# Patient Record
Sex: MALE | URBAN - METROPOLITAN AREA
[De-identification: names, ages, dates, MRNs, and addresses within clinical notes are randomized per-mention and may not be internally consistent; named-entity substitution may affect disease eponyms.]

---

## 2017-09-15 ENCOUNTER — EMERGENCY (EMERGENCY)
Facility: HOSPITAL | Age: 52
LOS: 1 days | Discharge: HOME | End: 2017-09-15
Attending: EMERGENCY MEDICINE
Payer: MEDICARE

## 2017-09-15 VITALS
DIASTOLIC BLOOD PRESSURE: 74 MMHG | SYSTOLIC BLOOD PRESSURE: 136 MMHG | HEART RATE: 77 BPM | OXYGEN SATURATION: 100 % | TEMPERATURE: 98 F | RESPIRATION RATE: 20 BRPM

## 2017-09-15 VITALS
DIASTOLIC BLOOD PRESSURE: 90 MMHG | HEART RATE: 73 BPM | OXYGEN SATURATION: 96 % | SYSTOLIC BLOOD PRESSURE: 148 MMHG | RESPIRATION RATE: 18 BRPM | WEIGHT: 279.99 LBS | HEIGHT: 72 IN | TEMPERATURE: 99 F

## 2017-09-15 LAB
ALBUMIN SERPL ELPH-MCNC: 4.4 G/DL — SIGNIFICANT CHANGE UP (ref 3.3–5.2)
ALP SERPL-CCNC: 50 U/L — SIGNIFICANT CHANGE UP (ref 40–120)
ALT FLD-CCNC: 15 U/L — SIGNIFICANT CHANGE UP
ANION GAP SERPL CALC-SCNC: 13 MMOL/L — SIGNIFICANT CHANGE UP (ref 5–17)
AST SERPL-CCNC: 20 U/L — SIGNIFICANT CHANGE UP
BASOPHILS # BLD AUTO: 7.5 K/UL — HIGH (ref 0–0.2)
BASOPHILS NFR BLD AUTO: 100 % — HIGH (ref 0–2)
BILIRUB SERPL-MCNC: 0.6 MG/DL — SIGNIFICANT CHANGE UP (ref 0.4–2)
BUN SERPL-MCNC: 16 MG/DL — SIGNIFICANT CHANGE UP (ref 8–20)
CALCIUM SERPL-MCNC: 9.3 MG/DL — SIGNIFICANT CHANGE UP (ref 8.6–10.2)
CHLORIDE SERPL-SCNC: 102 MMOL/L — SIGNIFICANT CHANGE UP (ref 98–107)
CO2 SERPL-SCNC: 29 MMOL/L — SIGNIFICANT CHANGE UP (ref 22–29)
CREAT SERPL-MCNC: 1.19 MG/DL — SIGNIFICANT CHANGE UP (ref 0.5–1.3)
CRP SERPL-MCNC: 2.6 MG/DL — HIGH (ref 0–0.4)
EOSINOPHIL # BLD AUTO: 0.9 K/UL — HIGH (ref 0–0.5)
EOSINOPHIL NFR BLD AUTO: 12.3 % — HIGH (ref 0–5)
ERYTHROCYTE [SEDIMENTATION RATE] IN BLOOD: 16 MM/HR — SIGNIFICANT CHANGE UP (ref 0–20)
GLUCOSE SERPL-MCNC: 101 MG/DL — SIGNIFICANT CHANGE UP (ref 70–115)
HCT VFR BLD CALC: 43.9 % — SIGNIFICANT CHANGE UP (ref 42–52)
HGB BLD-MCNC: 14.3 G/DL — SIGNIFICANT CHANGE UP (ref 14–18)
LYMPHOCYTES # BLD AUTO: 1.6 K/UL — SIGNIFICANT CHANGE UP (ref 1–4.8)
LYMPHOCYTES # BLD AUTO: 21.2 % — SIGNIFICANT CHANGE UP (ref 20–55)
MCHC RBC-ENTMCNC: 28.4 PG — SIGNIFICANT CHANGE UP (ref 27–31)
MCHC RBC-ENTMCNC: 32.6 G/DL — SIGNIFICANT CHANGE UP (ref 32–36)
MCV RBC AUTO: 87.3 FL — SIGNIFICANT CHANGE UP (ref 80–94)
MONOCYTES # BLD AUTO: 0 K/UL — SIGNIFICANT CHANGE UP (ref 0–0.8)
MONOCYTES NFR BLD AUTO: 0.4 % — LOW (ref 3–10)
NEUTROPHILS # BLD AUTO: 5 K/UL — SIGNIFICANT CHANGE UP (ref 1.8–8)
NEUTROPHILS NFR BLD AUTO: 66.1 % — SIGNIFICANT CHANGE UP (ref 37–73)
PLATELET # BLD AUTO: 242 K/UL — SIGNIFICANT CHANGE UP (ref 150–400)
POTASSIUM SERPL-MCNC: 4.1 MMOL/L — SIGNIFICANT CHANGE UP (ref 3.5–5.3)
POTASSIUM SERPL-SCNC: 4.1 MMOL/L — SIGNIFICANT CHANGE UP (ref 3.5–5.3)
PROT SERPL-MCNC: 8.1 G/DL — SIGNIFICANT CHANGE UP (ref 6.6–8.7)
RBC # BLD: 5.03 M/UL — SIGNIFICANT CHANGE UP (ref 4.6–6.2)
RBC # FLD: 15.5 % — SIGNIFICANT CHANGE UP (ref 11–15.6)
SODIUM SERPL-SCNC: 144 MMOL/L — SIGNIFICANT CHANGE UP (ref 135–145)
URATE SERPL-MCNC: 9.1 MG/DL — HIGH (ref 3.4–7)
WBC # BLD: 7.5 K/UL — SIGNIFICANT CHANGE UP (ref 4.8–10.8)
WBC # FLD AUTO: 7.5 K/UL — SIGNIFICANT CHANGE UP (ref 4.8–10.8)

## 2017-09-15 PROCEDURE — 99284 EMERGENCY DEPT VISIT MOD MDM: CPT

## 2017-09-15 PROCEDURE — 86140 C-REACTIVE PROTEIN: CPT

## 2017-09-15 PROCEDURE — 84550 ASSAY OF BLOOD/URIC ACID: CPT

## 2017-09-15 PROCEDURE — 96374 THER/PROPH/DIAG INJ IV PUSH: CPT

## 2017-09-15 PROCEDURE — 80053 COMPREHEN METABOLIC PANEL: CPT

## 2017-09-15 PROCEDURE — 73610 X-RAY EXAM OF ANKLE: CPT

## 2017-09-15 PROCEDURE — 99284 EMERGENCY DEPT VISIT MOD MDM: CPT | Mod: 25

## 2017-09-15 PROCEDURE — 73610 X-RAY EXAM OF ANKLE: CPT | Mod: 26,LT

## 2017-09-15 PROCEDURE — 85652 RBC SED RATE AUTOMATED: CPT

## 2017-09-15 PROCEDURE — 36415 COLL VENOUS BLD VENIPUNCTURE: CPT

## 2017-09-15 PROCEDURE — 85027 COMPLETE CBC AUTOMATED: CPT

## 2017-09-15 RX ORDER — IBUPROFEN 200 MG
1 TABLET ORAL
Qty: 21 | Refills: 0 | OUTPATIENT
Start: 2017-09-15 | End: 2017-09-22

## 2017-09-15 RX ORDER — IBUPROFEN 200 MG
800 TABLET ORAL ONCE
Qty: 0 | Refills: 0 | Status: COMPLETED | OUTPATIENT
Start: 2017-09-15 | End: 2017-09-15

## 2017-09-15 RX ORDER — MORPHINE SULFATE 50 MG/1
2 CAPSULE, EXTENDED RELEASE ORAL ONCE
Qty: 0 | Refills: 0 | Status: DISCONTINUED | OUTPATIENT
Start: 2017-09-15 | End: 2017-09-15

## 2017-09-15 RX ADMIN — Medication 800 MILLIGRAM(S): at 08:22

## 2017-09-15 RX ADMIN — MORPHINE SULFATE 2 MILLIGRAM(S): 50 CAPSULE, EXTENDED RELEASE ORAL at 08:22

## 2017-09-15 NOTE — ED PROVIDER NOTE - PROGRESS NOTE DETAILS
The patient unable to ambulate and ortho c/s called NO SEPTIC JOINT.  Most likely Gout.  Will DC home and f/u with PMD and Rheumatology

## 2017-09-15 NOTE — CONSULT NOTE ADULT - SUBJECTIVE AND OBJECTIVE BOX
Pt Name: BOWEN SIU    MRN: [id number]      Patient is a 52y Male presenting to the emergency department with a chief complaint of left ankle   Patient is a 52y old  Male who presents with a chief complaint of Left ankle pain since Wed. patient states was at work and felt onset of pain, no trauma, patient states pain increased this am, no fevers, no chills, no myalgia.  Er performed xrays, negative, CRP and Uric acid level increased    HEALTH ISSUES - PROBLEM Dx: Left ankle pain       .      REVIEW OF SYSTEMS      General: no fevers, no chills 	    Skin/Breast:  	  Ophthalmologic:  	  ENMT:	    Respiratory and Thorax:  	  Cardiovascular:	no chest pain     Gastrointestinal:	 no abdominal pain     Genitourinary:	    Musculoskeletal:	 Left ankle pain     Neurological:	    Psychiatric:	    Hematology/Lymphatics:	    Endocrine:	    Allergic/Immunologic:	    ROS is otherwise negative.    PAST MEDICAL & SURGICAL HISTORY:  PAST MEDICAL & SURGICAL HISTORY:  Asthma      Allergies: No Known Allergies      Medications:     FAMILY HISTORY:  : non-contributory    Social History: denies drug use    Ambulation: Walking independently                          14.3   7.5   )-----------( 242      ( 15 Sep 2017 08:38 )             43.9     09-15    144  |  102  |  16.0  ----------------------------<  101  4.1   |  29.0  |  1.19    Ca    9.3      15 Sep 2017 08:38    TPro  8.1  /  Alb  4.4  /  TBili  0.6  /  DBili  x   /  AST  20  /  ALT  15  /  AlkPhos  50  09-15      PHYSICAL EXAM:    Vital Signs Last 24 Hrs  T(C): 36.8 (15 Sep 2017 07:29), Max: 37 (15 Sep 2017 05:48)  T(F): 98.2 (15 Sep 2017 07:29), Max: 98.6 (15 Sep 2017 05:48)  HR: 71 (15 Sep 2017 07:29) (71 - 73)  BP: 150/88 (15 Sep 2017 07:29) (148/90 - 150/88)  BP(mean): --  RR: 18 (15 Sep 2017 07:29) (18 - 18)  SpO2: 98% (15 Sep 2017 07:29) (96% - 98%)  Daily Height in cm: 182.88 (15 Sep 2017 05:48)    Daily     Appearance: Alert, responsive, in no acute distress.    ENT Mucus Membranes moist intact     Neck FROM    Resp no labored breathing     Neurological: Sensation is grossly intact to light touch. 5/5 motor function of all extremities. No focal deficits or weaknesses found.    Skin: no rash on visible skin. Skin is clean, dry and intact. No bleeding. No abrasions. No ulcerations.    Vascular: 2+ distal pulses. Cap refill < 2 sec. No signs of venous insuffiency or stasis. No extremity ulcerations. No cyanosis.    Musculoskeletal:         Left Lower Extremity: Mild swelling to ankle, positive lateral tenderness, ROM intact without pain, no erythema, no warmth, pulse intact, no tenderness to foot     Imaging Studies: Left ankle xray negative fx     Case discussed with Dr Shelton    A/P:  Pt is a  52y Male with Patient is a 52y old  Male who presents with a chief complaint of left ankle pain  found to have left ankle swelling likely gout    PLAN:   1. NSAIDS for relief   2. Outpt follow up   3. Patient instructed to return to ER for Worsening of pain or fevers, chills, myaglia develop

## 2017-09-15 NOTE — ED PROVIDER NOTE - CHPI ED SYMPTOMS NEG
no weakness/no fever/no deformity/no abrasion/no back pain/no numbness/no tingling/no bruising/no stiffness

## 2017-09-15 NOTE — ED ADULT NURSE REASSESSMENT NOTE - NS ED NURSE REASSESS COMMENT FT1
Patient recd this morning A/Ox3, VSS, presents to ED stating that on wednesday when he got off work he noticed his left ankle was swollen and throbbing, patient states pain is only getting worse, 9/10, and is unable to walk on it.  Patient denies trauma.  Patient denies chest pain or sob.  Respirations are even and unlabored, lungs cta, +bowel x4 quads, abdomen soft, nontender/nondistended, skin w/d/i.

## 2017-09-15 NOTE — CONSULT NOTE ADULT - ATTENDING COMMENTS
- 51 yo male - Left ankle pain.  - Note reviewed and agree with plan: anti-inflammatories and follow up as needed    Tanya Shelton MD

## 2017-09-15 NOTE — ED ADULT NURSE NOTE - OBJECTIVE STATEMENT
patient states that he has had left ankle pain which started wednesday, he states that he has been limping and unable to bare weight on extremity. patient denies any trauma, trips or falls. Patient states that he was working and didn't tie his boots which may be related. patient has postive pulse, motor and sensory in extremity, able to move left foot with pain, patient has swelling around joint

## 2018-04-10 ENCOUNTER — RECORD ABSTRACTING (OUTPATIENT)
Age: 53
End: 2018-04-10

## 2018-04-10 DIAGNOSIS — Z82.49 FAMILY HISTORY OF ISCHEMIC HEART DISEASE AND OTHER DISEASES OF THE CIRCULATORY SYSTEM: ICD-10-CM

## 2018-04-10 DIAGNOSIS — R07.9 CHEST PAIN, UNSPECIFIED: ICD-10-CM

## 2018-04-10 DIAGNOSIS — Z86.79 PERSONAL HISTORY OF OTHER DISEASES OF THE CIRCULATORY SYSTEM: ICD-10-CM

## 2018-04-12 ENCOUNTER — APPOINTMENT (OUTPATIENT)
Dept: CARDIOLOGY | Facility: CLINIC | Age: 53
End: 2018-04-12

## 2018-06-25 ENCOUNTER — INPATIENT (INPATIENT)
Facility: HOSPITAL | Age: 53
LOS: 1 days | Discharge: HOME | DRG: 287 | End: 2018-06-27
Attending: INTERNAL MEDICINE | Admitting: HOSPITALIST
Payer: MEDICARE

## 2018-06-25 ENCOUNTER — TRANSCRIPTION ENCOUNTER (OUTPATIENT)
Age: 53
End: 2018-06-25

## 2018-06-25 VITALS
WEIGHT: 289.91 LBS | TEMPERATURE: 98 F | RESPIRATION RATE: 20 BRPM | OXYGEN SATURATION: 96 % | DIASTOLIC BLOOD PRESSURE: 155 MMHG | SYSTOLIC BLOOD PRESSURE: 182 MMHG | HEIGHT: 71 IN | HEART RATE: 91 BPM

## 2018-06-25 DIAGNOSIS — I24.9 ACUTE ISCHEMIC HEART DISEASE, UNSPECIFIED: ICD-10-CM

## 2018-06-25 DIAGNOSIS — Z29.9 ENCOUNTER FOR PROPHYLACTIC MEASURES, UNSPECIFIED: ICD-10-CM

## 2018-06-25 DIAGNOSIS — I10 ESSENTIAL (PRIMARY) HYPERTENSION: ICD-10-CM

## 2018-06-25 DIAGNOSIS — R07.9 CHEST PAIN, UNSPECIFIED: ICD-10-CM

## 2018-06-25 LAB
ALBUMIN SERPL ELPH-MCNC: 4.4 G/DL — SIGNIFICANT CHANGE UP (ref 3.3–5.2)
ALP SERPL-CCNC: 44 U/L — SIGNIFICANT CHANGE UP (ref 40–120)
ALT FLD-CCNC: 13 U/L — SIGNIFICANT CHANGE UP
ANION GAP SERPL CALC-SCNC: 14 MMOL/L — SIGNIFICANT CHANGE UP (ref 5–17)
AST SERPL-CCNC: 20 U/L — SIGNIFICANT CHANGE UP
BASOPHILS # BLD AUTO: 0 K/UL — SIGNIFICANT CHANGE UP (ref 0–0.2)
BASOPHILS NFR BLD AUTO: 0.4 % — SIGNIFICANT CHANGE UP (ref 0–2)
BILIRUB SERPL-MCNC: 0.3 MG/DL — LOW (ref 0.4–2)
BUN SERPL-MCNC: 18 MG/DL — SIGNIFICANT CHANGE UP (ref 8–20)
CALCIUM SERPL-MCNC: 8.9 MG/DL — SIGNIFICANT CHANGE UP (ref 8.6–10.2)
CHLORIDE SERPL-SCNC: 102 MMOL/L — SIGNIFICANT CHANGE UP (ref 98–107)
CK MB CFR SERPL CALC: 4.6 NG/ML — SIGNIFICANT CHANGE UP (ref 0–6.7)
CK SERPL-CCNC: 539 U/L — HIGH (ref 30–200)
CO2 SERPL-SCNC: 26 MMOL/L — SIGNIFICANT CHANGE UP (ref 22–29)
CREAT SERPL-MCNC: 1.29 MG/DL — SIGNIFICANT CHANGE UP (ref 0.5–1.3)
EOSINOPHIL # BLD AUTO: 0 K/UL — SIGNIFICANT CHANGE UP (ref 0–0.5)
EOSINOPHIL NFR BLD AUTO: 0.2 % — SIGNIFICANT CHANGE UP (ref 0–5)
GLUCOSE SERPL-MCNC: 112 MG/DL — SIGNIFICANT CHANGE UP (ref 70–115)
HCT VFR BLD CALC: 42.1 % — SIGNIFICANT CHANGE UP (ref 42–52)
HGB BLD-MCNC: 13.4 G/DL — LOW (ref 14–18)
LIDOCAIN IGE QN: 37 U/L — SIGNIFICANT CHANGE UP (ref 22–51)
LYMPHOCYTES # BLD AUTO: 1.4 K/UL — SIGNIFICANT CHANGE UP (ref 1–4.8)
LYMPHOCYTES # BLD AUTO: 24.5 % — SIGNIFICANT CHANGE UP (ref 20–55)
MCHC RBC-ENTMCNC: 27.7 PG — SIGNIFICANT CHANGE UP (ref 27–31)
MCHC RBC-ENTMCNC: 31.8 G/DL — LOW (ref 32–36)
MCV RBC AUTO: 87.2 FL — SIGNIFICANT CHANGE UP (ref 80–94)
MONOCYTES # BLD AUTO: 0.2 K/UL — SIGNIFICANT CHANGE UP (ref 0–0.8)
MONOCYTES NFR BLD AUTO: 3.9 % — SIGNIFICANT CHANGE UP (ref 3–10)
NEUTROPHILS # BLD AUTO: 4 K/UL — SIGNIFICANT CHANGE UP (ref 1.8–8)
NEUTROPHILS NFR BLD AUTO: 70.6 % — SIGNIFICANT CHANGE UP (ref 37–73)
NT-PROBNP SERPL-SCNC: 588 PG/ML — HIGH (ref 0–300)
PLATELET # BLD AUTO: 248 K/UL — SIGNIFICANT CHANGE UP (ref 150–400)
POTASSIUM SERPL-MCNC: 3.8 MMOL/L — SIGNIFICANT CHANGE UP (ref 3.5–5.3)
POTASSIUM SERPL-SCNC: 3.8 MMOL/L — SIGNIFICANT CHANGE UP (ref 3.5–5.3)
PROT SERPL-MCNC: 7.4 G/DL — SIGNIFICANT CHANGE UP (ref 6.6–8.7)
RBC # BLD: 4.83 M/UL — SIGNIFICANT CHANGE UP (ref 4.6–6.2)
RBC # FLD: 14.9 % — SIGNIFICANT CHANGE UP (ref 11–15.6)
SODIUM SERPL-SCNC: 142 MMOL/L — SIGNIFICANT CHANGE UP (ref 135–145)
TROPONIN T SERPL-MCNC: <0.01 NG/ML — SIGNIFICANT CHANGE UP (ref 0–0.06)
TROPONIN T SERPL-MCNC: <0.01 NG/ML — SIGNIFICANT CHANGE UP (ref 0–0.06)
WBC # BLD: 5.6 K/UL — SIGNIFICANT CHANGE UP (ref 4.8–10.8)
WBC # FLD AUTO: 5.6 K/UL — SIGNIFICANT CHANGE UP (ref 4.8–10.8)

## 2018-06-25 PROCEDURE — 99152 MOD SED SAME PHYS/QHP 5/>YRS: CPT

## 2018-06-25 PROCEDURE — 99223 1ST HOSP IP/OBS HIGH 75: CPT | Mod: 25

## 2018-06-25 PROCEDURE — 71046 X-RAY EXAM CHEST 2 VIEWS: CPT | Mod: 26

## 2018-06-25 PROCEDURE — 93306 TTE W/DOPPLER COMPLETE: CPT | Mod: 26

## 2018-06-25 PROCEDURE — 99223 1ST HOSP IP/OBS HIGH 75: CPT | Mod: AI

## 2018-06-25 PROCEDURE — 93460 R&L HRT ART/VENTRICLE ANGIO: CPT | Mod: 26

## 2018-06-25 PROCEDURE — 93010 ELECTROCARDIOGRAM REPORT: CPT

## 2018-06-25 PROCEDURE — 99285 EMERGENCY DEPT VISIT HI MDM: CPT

## 2018-06-25 RX ORDER — METOPROLOL TARTRATE 50 MG
100 TABLET ORAL ONCE
Qty: 0 | Refills: 0 | Status: COMPLETED | OUTPATIENT
Start: 2018-06-25 | End: 2018-06-25

## 2018-06-25 RX ORDER — SODIUM CHLORIDE 9 MG/ML
3 INJECTION INTRAMUSCULAR; INTRAVENOUS; SUBCUTANEOUS ONCE
Qty: 0 | Refills: 0 | Status: COMPLETED | OUTPATIENT
Start: 2018-06-25 | End: 2018-06-25

## 2018-06-25 RX ORDER — ASPIRIN/CALCIUM CARB/MAGNESIUM 324 MG
325 TABLET ORAL ONCE
Qty: 0 | Refills: 0 | Status: COMPLETED | OUTPATIENT
Start: 2018-06-25 | End: 2018-06-25

## 2018-06-25 RX ORDER — LISINOPRIL 2.5 MG/1
10 TABLET ORAL DAILY
Qty: 0 | Refills: 0 | Status: DISCONTINUED | OUTPATIENT
Start: 2018-06-25 | End: 2018-06-26

## 2018-06-25 RX ORDER — CARVEDILOL PHOSPHATE 80 MG/1
6.25 CAPSULE, EXTENDED RELEASE ORAL EVERY 12 HOURS
Qty: 0 | Refills: 0 | Status: DISCONTINUED | OUTPATIENT
Start: 2018-06-25 | End: 2018-06-26

## 2018-06-25 RX ORDER — FUROSEMIDE 40 MG
20 TABLET ORAL
Qty: 0 | Refills: 0 | Status: DISCONTINUED | OUTPATIENT
Start: 2018-06-25 | End: 2018-06-26

## 2018-06-25 RX ADMIN — LISINOPRIL 10 MILLIGRAM(S): 2.5 TABLET ORAL at 19:00

## 2018-06-25 RX ADMIN — CARVEDILOL PHOSPHATE 6.25 MILLIGRAM(S): 80 CAPSULE, EXTENDED RELEASE ORAL at 21:28

## 2018-06-25 RX ADMIN — SODIUM CHLORIDE 3 MILLILITER(S): 9 INJECTION INTRAMUSCULAR; INTRAVENOUS; SUBCUTANEOUS at 05:24

## 2018-06-25 RX ADMIN — Medication 20 MILLIGRAM(S): at 16:59

## 2018-06-25 RX ADMIN — Medication 325 MILLIGRAM(S): at 09:05

## 2018-06-25 RX ADMIN — Medication 100 MILLIGRAM(S): at 06:50

## 2018-06-25 NOTE — PROGRESS NOTE ADULT - SUBJECTIVE AND OBJECTIVE BOX
Cardiology NP preprocedure note:    -53 M h/o HTN presented with intermittent chest pressure for 2 weeks. Reports that it is located at the left chest wall, and is worse when he is physically active, 7/10, associated with dyspnea. Has not seen his PMD for over a year.  In the ED, EKG was SR.  Troponin negative x 2.  Patient scheduled for LHC to r/o obstructive CAD.    ASA 3  Mallampati 2

## 2018-06-25 NOTE — DISCHARGE NOTE ADULT - CARE PROVIDER_API CALL
Maycol Hernandez), Cardiovascular Disease; Internal Medicine; Interventional Cardiology  [address]  [address]  [place] [address]  Phone: [phone]  Fax: [phone] Maycol Hernandez), Cardiovascular Disease; Internal Medicine; Interventional Cardiology  [address] [place] [address]  Phone: [phone]  Fax: [phone]    Zackary Ayoub), Cardiovascular Disease  [address] [place] [address]  Phone: [phone]  Fax: [phone]

## 2018-06-25 NOTE — DISCHARGE NOTE ADULT - CARE PROVIDERS DIRECT ADDRESSES
[email or web address]

## 2018-06-25 NOTE — CONSULT NOTE ADULT - ASSESSMENT
54 yo male presents with intermittent chest pain for the past two weeks. The patient presents for evaluation of recurrent left sided chest pain, which usually last 30 to 60 minutes when it occurs. The patient states that he had a hx of HTN but has not take medication in over a year.  He state he checked his BP 3 months ago and it was normal. He state the chest pain is intermittent in nature without clear cause. He also states that his father and daughter have significant cardiac disease. Last night 7/10 lt sscp with associated sob/wheezing pain. Currently is stable but had recent cp while here in ED. initial tni (-) ECG SR ns st twa.    Imp:      1: SSCP: intermediate pre-test prob for CAD CRF (+) FH of CAD (+)increase BMI  recc 2-D echo consider cardiac cath r/o obstructive   cad, trend cp/tni; asa/statin; check lipid panel  2: HTN: above goal BB rxn  3: SOB h/o asthma ; echo as above , nebs prn

## 2018-06-25 NOTE — H&P ADULT - HISTORY OF PRESENT ILLNESS
53 M h/o HTN presented with intermittent chest pressure for 2 weeks. Reports that it is located at the left chest wall, and is worse when he is physically active, 7/10, associated with dyspnea. Has not seen his PMD for over a year.  In the ED, EKG was SR.

## 2018-06-25 NOTE — DISCHARGE NOTE ADULT - MEDICATION SUMMARY - MEDICATIONS TO TAKE
I will START or STAY ON the medications listed below when I get home from the hospital:    lisinopril 10 mg oral tablet  -- 1 tab(s) by mouth once a day  -- Indication: For Htn    carvedilol 6.25 mg oral tablet  -- 1 tab(s) by mouth every 12 hours  -- Indication: For Htn    furosemide 20 mg oral tablet  -- 1 tab(s) by mouth once a day  -- Indication: For Chf

## 2018-06-25 NOTE — DISCHARGE NOTE ADULT - PATIENT PORTAL LINK FT
You can access the [facility] Patient Portal, offered by [facility], by registering with the following website: [email or web address]

## 2018-06-25 NOTE — DISCHARGE NOTE ADULT - PLAN OF CARE
s/o cath no intervention follow up with cardiology in 1 week  takes meds as prescribed follow up with cardiology take meds as prescribed

## 2018-06-25 NOTE — DISCHARGE NOTE ADULT - HOSPITAL COURSE
53 M h/o HTN presented with intermittent chest pressure for 2 weeks. Reports that it is located at the left chest wall, and is worse when he is physically active, 7/10, associated with dyspnea. Has not seen his PMD for over a year.  In the ED, EKG was SR. Cardiac cath showed nonobstructive CAD, TTE LVEF 40-45%.     1) NICM with new diagnosis of systolic CHF  - s/p cath no intervention needed or obstruction  - per cardio cleared for discharge   - change lasix to po daily  - continue coreg and lisinopril    2) HTN  - continue coreg and lisinopril    3) JESSICA  - outpatient work up     pt stable for discharge    total time spent for discharge 33 minutes

## 2018-06-25 NOTE — ED PROVIDER NOTE - OBJECTIVE STATEMENT
52 yo male presents with intermittent chest pain for the past two weeks. The patient presents for evaluation of recurrent left sided chest pain, which usually last 30 to 60 minutes when it occurs. The patient states that he had a hx of HTN but has not take medication in over a year.  He state he checked his BP 3 months ago and it was normal. He state the chest pain is intermittent in nature without clear cause. He also states that his father and daughter have significant cardiac disease.

## 2018-06-25 NOTE — DISCHARGE NOTE ADULT - OTHER SIGNIFICANT FINDINGS
2decho:  PHYSICIAN INTERPRETATION:  Left Ventricle: Endocardial visualization was enhanced with intravenous   echo contrast. The left ventricular internal cavity size is normal. Left   ventricular wall thickness is mildly increased.  Global LV systolic function was mildly decreased. Left ventricular   ejection fraction, by visual estimation, is 40 to 45%. Spectral Doppler   shows impaired relaxation pattern of left ventricular myocardial filling   (Grade I diastolic dysfunction).  Right Ventricle: The right ventricle was not well visualized.  Left Atrium: Mild to moderately enlarged left atrium.  Pericardium: There is no evidence of pericardial effusion. There is a   significant pericardial fat pad present.  Mitral Valve: The mitral valve is normal in structure. Mitral leaflet   mobility is normal. No evidence of mitral stenosis. Mild mitral valve   regurgitation is seen.  Tricuspid Valve: The tricuspid valve is normal. Trivial tricuspid   regurgitation is visualized. Adequate TR velocity was not obtained to   accurately assess RVSP.  Aortic Valve: Normal trileaflet aortic valve with normal opening. No   evidence of aortic stenosis. No evidence of aortic valve regurgitation is   seen.  Pulmonic Valve: The pulmonic valve was not well visualized. Trace   pulmonic valve regurgitation.  Aorta: The aortic root is normal in size and structure. The ascending   aorta was not well visualized. The aortic arch was not well visualized.   Aortic root measured at Sinus of Valsalva is normal.  Pulmonary Artery: The pulmonary artery is of normal size and origin.  Venous: The pulmonary veins were not well visualized. The inferior vena   cava is not well visualized.       Summary:   1. Technically difficult study.   2. Endocardial visualization was enhanced with intravenous echo contrast.   3. Mildly decreased global left ventricular systolic function. Left   ventricular ejection fraction, by visual estimation, is 40 to 45%.   4. There is mild concentric left ventricular hypertrophy.   5. Spectral Doppler shows impaired relaxation pattern of left   ventricular myocardial filling (Grade I diastolic dysfunction).   6. The right ventricle was not well visualized.   7. Mild mitral valve regurgitation.   8. There is no evidence of pericardial effusion.   9. ** No prior echocardiograms available for comparison.    [id number] Ashanti Pisano DO, Electronically signed on 6/25/2018 at   6:32:10 PM

## 2018-06-25 NOTE — ED ADULT NURSE REASSESSMENT NOTE - NS ED NURSE REASSESS COMMENT FT1
Received report. Pt resting comfortably on stretcher. C/o of 4/10 chest pain. Denies SOB. Awaiting cardio consult. Will continue to monitor.

## 2018-06-25 NOTE — PATIENT PROFILE ADULT. - NS TRANSFER PATIENT BELONGINGS
Jewelry/Money (specify)/Other belongings/Clothing/bracelet  wallet with money/Electronic Device (specify)

## 2018-06-25 NOTE — CONSULT NOTE ADULT - PROBLEM SELECTOR RECOMMENDATION 9
Symptoms of chest pain and shortness of breath.   Progressive SOB. Concern for underlying CAD.   Plan for left and right heart cath.

## 2018-06-25 NOTE — SBIRT NOTE. - NSSBIRTSERVICES_GEN_A_ED_FT
Provided SBIRT services: Full screen Negative. Positive reinforcement provided given patient currently within healthy guidelines. Education materials reviewed and given to patient.  Audit Score: 4  DAST Score: 0  Duration = 5 Minutes

## 2018-06-25 NOTE — ED PROVIDER NOTE - PROGRESS NOTE DETAILS
Patient stable. Currently awaiting cardiac CT. ATTENDING MD Delfino: Seen by cardiology requesting cancel coronary CT and admit for cath.

## 2018-06-25 NOTE — PROGRESS NOTE ADULT - SUBJECTIVE AND OBJECTIVE BOX
Cardiology NP note:     -Patient s/p coronary angiogram.   Normal coronary vasculature.   LVEF 40%.   Severe pulmonary hypertension with PA pressures of 60s in the setting of significant systemic hypertension.   Severely elevated wedge pressure.   Plan for coreg 6.25 mg po bid, lisinopril 10 mg po qdaily, lasix 20 mg IV bid.  Based on BP and labs, will consider adding spironolactone.   TTE to assess for valvular pathology. Cardiology NP note:     -s/p LHC/RHC: normal cors; LVEF 40%; Severe pulmonary hypertension with PA pressures of 60s in the setting of significant systemic hypertension; Severely elevated wedge pressure.   -Right radial site benign without hematoma/bleeding; RUE warm/mobile/acyanotic; + right ulnar pulse; Right FV site benign without hematoma/bleeding; + right PP  -VSS    -A/P: 53 M h/o HTN presented with intermittent chest pressure for 2 weeks. Reports that it is located at the left chest wall, and is worse when he is physically active, 7/10, associated with dyspnea. Has not seen his PMD for over a year.  In the ED, EKG was SR.  Troponin negative x 2.  Patient scheduled for LHC to r/o obstructive CAD now s/p LHC/RHC revealing the above results.  -Tele bed monitoring  -bedrest until 2 hours post venous sheath removal then OOB  -Removal radial band 1 hour post procedure  -Plan for coreg 6.25 mg po bid, lisinopril 10 mg po qdaily, lasix 20 mg IV bid   - Based on BP and labs, will consider adding spironolactone  -TTE to assess for valvular pathology  -the above as per Dr. Hernandez  -Additional plan as per Attending MD

## 2018-06-25 NOTE — H&P ADULT - RS GEN PE MLT RESP DETAILS PC
clear to auscultation bilaterally/good air movement/no chest wall tenderness/breath sounds equal/respirations non-labored

## 2018-06-25 NOTE — CONSULT NOTE ADULT - ATTENDING COMMENTS
Patient s/p coronary angiogram.   Normal coronary vasculature.   LVEF 40%.   Severe pulmonary hypertension with PA pressures of 60s in the setting of significant systemic hypertension.   Plan for coreg 6.25 mg po bid, lisinopril 10 mg po qdaily. Based on BP and labs, will consider adding spironolactone.   TTE to assess for valvular pathology. Patient s/p coronary angiogram.   Normal coronary vasculature.   LVEF 40%.   Severe pulmonary hypertension with PA pressures of 60s in the setting of significant systemic hypertension.   Severely elevated wedge pressure.   Plan for coreg 6.25 mg po bid, lisinopril 10 mg po qdaily, lasix 20 mg IV bid.  Based on BP and labs, will consider adding spironolactone.   TTE to assess for valvular pathology.

## 2018-06-25 NOTE — CONSULT NOTE ADULT - SUBJECTIVE AND OBJECTIVE BOX
[facility]                                                        [facility]                                                             Office: [address] [address]                                                            Telephone: [phone]. Fax:[phone]            Patient is a 53y old  Male who presents with a chief complaint of     HPI:     Priamary Complaint:        Severity : 5/10  Radiation ;  left arm  Associated factors (-) triggers such as caffiene, etoh, exercise, stress  Onset: abrupt  Duration seconds to minutes ; No episodes > 1 hour    Patient denies orthopnea PND,LE edema , syncope or pre-syncope  Functional status > 10 mets  No limitations with AODL  Co-morbid: (-) DM, (_) CVA, (_) COPD (-) PE (-) DVT (-) Bleeding or clotting disorders  ECG: SR with ns st twa  no phenotypic evidence of Brs, HCM ,LQTS      PAST MEDICAL & SURGICAL HISTORY:  Hypertension  Asthma  No significant past surgical history      PREVIOUS DIAGNOSTIC TESTING:        Allergies    No Known Allergies    Intolerances        MEDICATIONS  (STANDING):  aspirin 325 milliGRAM(s) Oral Once    MEDICATIONS  (PRN):      FAMILY HISTORY:  Family history of early CAD (Father)      SOCIAL HISTORY:Lives with family    CIGARETTES:None    ALCOHOL:None    REVIEW OF SYSTEMS:  CONSTITUTIONAL: No fever, weight loss, or fatigue  EYES: No eye pain, visual disturbances, or discharge  ENMT:  No difficulty hearing, tinnitus, vertigo; No sinus or throat pain  NECK: No pain or stiffness  RESPIRATORY: No cough, wheezing, chills or hemoptysis; No Shortness of Breath  CARDIOVASCULAR: No chest pain, palpitations, passing out, dizziness, or leg swelling  GASTROINTESTINAL: No abdominal or epigastric pain. No nausea, vomiting, or hematemesis; No diarrhea or constipation. No melena or hematochezia.  GENITOURINARY: No dysuria, frequency, hematuria, or incontinence  NEUROLOGICAL: No headaches, memory loss, loss of strength, numbness, or tremors  SKIN: No itching, burning, rashes, or lesions   LYMPH Nodes: No enlarged glands  ENDOCRINE: No heat or cold intolerance; No hair loss  MUSCULOSKELETAL: No joint pain or swelling; No muscle, back, or extremity pain  PSYCHIATRIC: No depression, anxiety, mood swings, or difficulty sleeping  HEME/LYMPH: No easy bruising, or bleeding gums  ALLERY AND IMMUNOLOGIC: No hives or eczema	    Vital Signs Last 24 Hrs  T(C): 36.6 (25 Jun 2018 07:48), Max: 36.8 (25 Jun 2018 04:17)  T(F): 97.9 (25 Jun 2018 07:48), Max: 98.3 (25 Jun 2018 04:17)  HR: 62 (25 Jun 2018 07:48) (62 - 91)  BP: 157/102 (25 Jun 2018 07:48) (157/102 - 182/155)  BP(mean): --  RR: 18 (25 Jun 2018 07:48) (18 - 20)  SpO2: 95% (25 Jun 2018 07:48) (95% - 96%)    Daily Height in cm: 180.34 (25 Jun 2018 04:17)    Daily     I&O's Detail      PHYSICAL EXAM:  Appearance: Normal, well nourished	  HEENT:   Normal oral mucosa, PERRL, EOMI, sclera non-icteric	  Lymphatic: No cervical lymphadenopathy  Cardiovascular: Normal S1 S2, No JVD, No cardiac murmurs, No carotid bruits, No peripheral edema  Respiratory: Lungs clear to auscultation	  Psychiatry: A & O x 3, Mood & affect appropriate  Gastrointestinal:  Soft, Non-tender, + BS, no bruits	  Skin: No rashes, No ecchymoses, No cyanosis  Neurologic: Grossly non-focal with full strength in all four extremities  Extremities: Normal range of motion, No clubbing, cyanosis or edema  Vascular: Peripheral pulses palpable 2+ bilaterally      INTERPRETATION OF TELEMETRY:    ECG:    LABS:                        13.4   5.6   )-----------( 248      ( 25 Jun 2018 05:32 )             42.1     06-25    142  |  102  |  18.0  ----------------------------<  112  3.8   |  26.0  |  1.29    Ca    8.9      25 Jun 2018 05:32    TPro  7.4  /  Alb  4.4  /  TBili  0.3<L>  /  DBili  x   /  AST  20  /  ALT  13  /  AlkPhos  44  06-25      CARDIAC MARKERS ( 25 Jun 2018 05:32 )   U/L / CKMB4.6 ng/mL / Troponin T<0.01 ng/mL /          I&O's Summary    BNPSerum Pro-Brain Natriuretic Peptide: 588 pg/mL (06-25 @ 05:32)    RADIOLOGY & ADDITIONAL STUDIES: [facility]                                                        [facility]                                                             Office: [address] [place] [address]                                                            Telephone: [phone]. Fax:[phone]            Patient is a 53y old  Male who presents with a chief complaint of     HPI:  54 yo male presents with intermittent chest pain for the past two weeks. The patient presents for evaluation of recurrent left sided chest pain, which usually last 30 to 60 minutes when it occurs. The patient states that he had a hx of HTN but has not take medication in over a year.  He state he checked his BP 3 months ago and it was normal. He state the chest pain is intermittent in nature without clear cause. He also states that his father and daughter have significant cardiac disease. Last night 7/10 lt sscp with associated sob/wheezing pain. Currently is stable but had recent cp while here in ED. initial tni (-) ECG SR ns st twa.       Primary Complaint:        Severity : 5/10  Radiation ;  left arm  Associated factors (-) triggers such as caffiene, etoh, exercise, stress  Onset: abrupt  Duration seconds to minutes ; No episodes > 1 hour    Patient denies orthopnea PND,LE edema , syncope or pre-syncope  Functional status > 10 mets  No limitations with AODL  Co-morbid: (-) DM, (_) CVA, (_) COPD (-) PE (-) DVT (-) Bleeding or clotting disorders  ECG: SR with ns st twa  no phenotypic evidence of Brs, HCM ,LQTS      PAST MEDICAL & SURGICAL HISTORY:  Hypertension  Asthma  No significant past surgical history      PREVIOUS DIAGNOSTIC TESTING:        Allergies    No Known Allergies    Intolerances        MEDICATIONS  (STANDING):  aspirin 325 milliGRAM(s) Oral Once    MEDICATIONS  (PRN):      FAMILY HISTORY:  Family history of early CAD (Father)      SOCIAL HISTORY:Lives with family    CIGARETTES:None    ALCOHOL:None    REVIEW OF SYSTEMS:  CONSTITUTIONAL: No fever, weight loss, or fatigue  EYES: No eye pain, visual disturbances, or discharge  ENMT:  No difficulty hearing, tinnitus, vertigo; No sinus or throat pain  NECK: No pain or stiffness  RESPIRATORY: No cough, wheezing, chills or hemoptysis; No Shortness of Breath  CARDIOVASCULAR: No chest pain, palpitations, passing out, dizziness, or leg swelling  GASTROINTESTINAL: No abdominal or epigastric pain. No nausea, vomiting, or hematemesis; No diarrhea or constipation. No melena or hematochezia.  GENITOURINARY: No dysuria, frequency, hematuria, or incontinence  NEUROLOGICAL: No headaches, memory loss, loss of strength, numbness, or tremors  SKIN: No itching, burning, rashes, or lesions   LYMPH Nodes: No enlarged glands  ENDOCRINE: No heat or cold intolerance; No hair loss  MUSCULOSKELETAL: No joint pain or swelling; No muscle, back, or extremity pain  PSYCHIATRIC: No depression, anxiety, mood swings, or difficulty sleeping  HEME/LYMPH: No easy bruising, or bleeding gums  ALLERY AND IMMUNOLOGIC: No hives or eczema	    Vital Signs Last 24 Hrs  T(C): 36.6 (25 Jun 2018 07:48), Max: 36.8 (25 Jun 2018 04:17)  T(F): 97.9 (25 Jun 2018 07:48), Max: 98.3 (25 Jun 2018 04:17)  HR: 62 (25 Jun 2018 07:48) (62 - 91)  BP: 157/102 (25 Jun 2018 07:48) (157/102 - 182/155)  BP(mean): --  RR: 18 (25 Jun 2018 07:48) (18 - 20)  SpO2: 95% (25 Jun 2018 07:48) (95% - 96%)    Daily Height in cm: 180.34 (25 Jun 2018 04:17)    Daily     I&O's Detail      PHYSICAL EXAM:  Appearance: Normal, well nourished	  HEENT:   Normal oral mucosa, PERRL, EOMI, sclera non-icteric	  Lymphatic: No cervical lymphadenopathy  Cardiovascular: Normal S1 S2, No JVD, No cardiac murmurs, No carotid bruits, No peripheral edema  Respiratory: Lungs clear to auscultation	  Psychiatry: A & O x 3, Mood & affect appropriate  Gastrointestinal:  Soft, Non-tender, + BS, no bruits	  Skin: No rashes, No ecchymoses, No cyanosis  Neurologic: Grossly non-focal with full strength in all four extremities  Extremities: Normal range of motion, No clubbing, cyanosis or edema  Vascular: Peripheral pulses palpable 2+ bilaterally      INTERPRETATION OF TELEMETRY:    ECG:    LABS:                        13.4   5.6   )-----------( 248      ( 25 Jun 2018 05:32 )             42.1     06-25    142  |  102  |  18.0  ----------------------------<  112  3.8   |  26.0  |  1.29    Ca    8.9      25 Jun 2018 05:32    TPro  7.4  /  Alb  4.4  /  TBili  0.3<L>  /  DBili  x   /  AST  20  /  ALT  13  /  AlkPhos  44  06-25      CARDIAC MARKERS ( 25 Jun 2018 05:32 )   U/L / CKMB4.6 ng/mL / Troponin T<0.01 ng/mL /          I&O's Summary    BNPSerum Pro-Brain Natriuretic Peptide: 588 pg/mL (06-25 @ 05:32)    RADIOLOGY & ADDITIONAL STUDIES:

## 2018-06-25 NOTE — DISCHARGE NOTE ADULT - CARE PLAN
Principal Discharge DX:	Chest pain  Goal:	s/o cath no intervention  Assessment and plan of treatment:	follow up with cardiology in 1 week  takes meds as prescribed  Secondary Diagnosis:	Cardiomyopathy  Assessment and plan of treatment:	follow up with cardiology  Secondary Diagnosis:	Hypertension  Assessment and plan of treatment:	take meds as prescribed  Secondary Diagnosis:	JESSICA (obstructive sleep apnea)

## 2018-06-25 NOTE — DISCHARGE NOTE ADULT - ADDITIONAL INSTRUCTIONS
s/p cardiac Catheterization: No heavy lifting, driving, sex, tub baths, swimming, or any activity that submerges the lower half of the body in water for 48 hours.  Limited walking and stairs for 48 hours.    Change the bandaid after 24 hours and every 24 hours after that.  Keep the puncture site dry and covered with a bandaid until a scab forms.    Observe the site frequently.  If bleeding or a large lump (the size of a golf ball or bigger) occurs lie flat, apply continuous direct pressure just above the puncture site for at least 10 minutes, and notify your physician immediately.  If the bleeding cannot be controlled, call 911 immediately for assistance.  Notify your physician of pain, swelling or any drainage.    Notify your physician immediately if coldness, numbness, discoloration or pain in your foot occurs.  Restricted use with no heavy lifting of affected arm for 48 hours.  No submerging the arm in water for 48 hours.  You may start showering today.  Call your doctor for any bleeding, swelling, loss of sensation in the hand or fingers, or fingers turning blue.  If heavy bleeding or large lumps form, hold pressure at the spot and come to the Emergency Room.  Follow up with Dr. Hernandez in one to two weeks.

## 2018-06-25 NOTE — CONSULT NOTE ADULT - SUBJECTIVE AND OBJECTIVE BOX
[facility]                                                       [facility]                                                        Office: [address] [address]                                                       Telephone: [phone]. Fax:[phone]      CC: Shortness of breath    HPI: 53 M h/o HTN presented with intermittent chest pressure for 2 weeks. Reports that it is located at the left chest wall, and is worse when he is physically active, 7/10, associated with dyspnea. Has not seen his PMD for over a year.  In the ED, EKG was SR.     Discussed various options with patient. Plan for cardiac catheterization. Risks/benefits explained to patient and consent signed by patient.     PAST MEDICAL & SURGICAL HISTORY:  Hypertension  Asthma  No significant past surgical history    FAMILY HISTORY:  Family history of early CAD (Father)    SOCIAL HISTORY: no EtOH, drugs or tobacco    MEDICATIONS  (STANDING):    ROS: All others negative    PHYSICAL EXAM:  Vital Signs Last 24 Hrs  T(C): 36.8 (25 Jun 2018 12:30), Max: 36.8 (25 Jun 2018 04:17)  T(F): 98.2 (25 Jun 2018 12:30), Max: 98.3 (25 Jun 2018 04:17)  HR: 54 (25 Jun 2018 12:30) (54 - 91)  BP: 157/105 (25 Jun 2018 12:30) (157/102 - 182/155)  BP(mean): 120 (25 Jun 2018 11:52) (120 - 120)  RR: 18 (25 Jun 2018 12:30) (18 - 20)  SpO2: 100% (25 Jun 2018 12:30) (95% - 100%)  I&O's Summary    Appearance: Normal	  HEENT:   Normal oral mucosa, PERRL, EOMI	  Lymphatic: No lymphadenopathy  Cardiovascular: Normal S1 S2, No JVD, No murmurs, No edema  Respiratory: Lungs clear to auscultation	  Psychiatry: A & O x 3, Mood & affect appropriate  Gastrointestinal:  Soft, Non-tender, + BS	  Skin: No rashes, No ecchymoses, No cyanosis  Neurologic: Non-focal  Extremities: Normal range of motion, No clubbing, cyanosis or edema  Vascular: Peripheral pulses palpable 2+ bilaterally    ECG: Sinus  LABS:                        13.4   5.6   )-----------( 248      ( 25 Jun 2018 05:32 )             42.1     06-25    142  |  102  |  18.0  ----------------------------<  112  3.8   |  26.0  |  1.29    Ca    8.9      25 Jun 2018 05:32    TPro  7.4  /  Alb  4.4  /  TBili  0.3<L>  /  DBili  x   /  AST  20  /  ALT  13  /  AlkPhos  44  06-25      CARDIAC MARKERS ( 25 Jun 2018 11:17 )  x     / <0.01 ng/mL / x     / x     / x      CARDIAC MARKERS ( 25 Jun 2018 05:32 )  x     / <0.01 ng/mL / 539 U/L / x     / 4.6 ng/mL

## 2018-06-25 NOTE — DISCHARGE NOTE ADULT - MEDICATION SUMMARY - MEDICATIONS TO STOP TAKING
I will STOP taking the medications listed below when I get home from the hospital:    Cipro 500 mg oral tablet  -- 1 tab(s) by mouth 2 times a day  -- Avoid prolonged or excessive exposure to direct and/or artificial sunlight while taking this medication.  Check with your doctor before becoming pregnant.  Do not take dairy products, antacids, or iron preparations within one hour of this medication.  Finish all this medication unless otherwise directed by prescriber.  Medication should be taken with plenty of water.    Flagyl 500 mg oral tablet  -- 1 tab(s) by mouth 3 times a day  -- Do not drink alcoholic beverages when taking this medication.  Finish all this medication unless otherwise directed by prescriber.  May discolor urine or feces.    ibuprofen 800 mg oral tablet  -- 1 tab(s) by mouth every 8 hours   -- Do not take this drug if you are pregnant.  It is very important that you take or use this exactly as directed.  Do not skip doses or discontinue unless directed by your doctor.  May cause drowsiness or dizziness.  Obtain medical advice before taking any non-prescription drugs as some may affect the action of this medication.  Take with food or milk.

## 2018-06-26 DIAGNOSIS — I25.10 ATHEROSCLEROTIC HEART DISEASE OF NATIVE CORONARY ARTERY WITHOUT ANGINA PECTORIS: ICD-10-CM

## 2018-06-26 DIAGNOSIS — G47.33 OBSTRUCTIVE SLEEP APNEA (ADULT) (PEDIATRIC): ICD-10-CM

## 2018-06-26 DIAGNOSIS — I42.9 CARDIOMYOPATHY, UNSPECIFIED: ICD-10-CM

## 2018-06-26 PROCEDURE — 99232 SBSQ HOSP IP/OBS MODERATE 35: CPT

## 2018-06-26 RX ORDER — CARVEDILOL PHOSPHATE 80 MG/1
6.25 CAPSULE, EXTENDED RELEASE ORAL EVERY 12 HOURS
Qty: 0 | Refills: 0 | Status: DISCONTINUED | OUTPATIENT
Start: 2018-06-26 | End: 2018-06-27

## 2018-06-26 RX ORDER — FUROSEMIDE 40 MG
20 TABLET ORAL
Qty: 0 | Refills: 0 | Status: DISCONTINUED | OUTPATIENT
Start: 2018-06-26 | End: 2018-06-27

## 2018-06-26 RX ORDER — LISINOPRIL 2.5 MG/1
10 TABLET ORAL DAILY
Qty: 0 | Refills: 0 | Status: DISCONTINUED | OUTPATIENT
Start: 2018-06-26 | End: 2018-06-27

## 2018-06-26 RX ADMIN — LISINOPRIL 10 MILLIGRAM(S): 2.5 TABLET ORAL at 05:50

## 2018-06-26 RX ADMIN — Medication 20 MILLIGRAM(S): at 05:50

## 2018-06-26 RX ADMIN — CARVEDILOL PHOSPHATE 6.25 MILLIGRAM(S): 80 CAPSULE, EXTENDED RELEASE ORAL at 18:13

## 2018-06-26 RX ADMIN — Medication 20 MILLIGRAM(S): at 18:12

## 2018-06-26 NOTE — PROGRESS NOTE ADULT - SUBJECTIVE AND OBJECTIVE BOX
Updates:    53 M h/o HTN presented with intermittent chest pressure.  Had cardiac cath yesterday:  Normal coronaries, sever pulmonary htn.   Plan as per PCP.      PMH  Hypertension  Asthma  Essential hypertension  ACS (acute coronary syndrome)    REVIEW OF SYSTEMS  General: Denies fever, chills, pain, no discomfort  Respiratory and Thorax:  Denies cough, sob, or any discomfort  Cardiovascular:  Denies chest pain, palpitations or any discomfort	  Gastrointestinal:  Denies n/v/d, constipation, or any discomfort  Genitourinary:  Denies frequency, burning, or pain  Musculoskeletal:  Denies joint pain, swelling, or any discomfort   Neurological:  Denies headache, dizziness blurred vision, numbing or tingling  Psychiatric:  Denies sadness or depression  Hematology  Juventino bleeding o swelling    MEDICATIONS:  carvedilol 6.25 milliGRAM(s) Oral every 12 hours  furosemide   Injectable 20 milliGRAM(s) IV Push two times a day  lisinopril 10 milliGRAM(s) Oral daily    PHYSICAL EXAM:  T(C): 37.1 (18 @ 10:02), Max: 37.4 (18 @ 21:22)  HR: 65 (18 @ 10:02) (49 - 65)  BP: 114/64 (18 @ 10:02) (114/64 - 180/100)  RR: 18 (18 @ 10:02) (17 - 18)  SpO2: 94% (18 @ 05:42) (94% - 100%)  I&O's Summary  2018 07:01  -  2018 12:55  --------------------------------------------------------  IN: 240 mL / OUT: 300 mL / NET: -60 mL  Daily Weight in k.8 (2018 05:00)  Appearance: Normal	  HEENT:   Normal oral mucosa, PERRL, EOMI	  Lymphatic: No lymphadenopathy  Cardiovascular: Normal S1 S2, No JVD, No murmurs, No edema  Respiratory: Lungs clear to auscultation	  Psychiatry: A & O x 3, Mood & affect appropriate  Gastrointestinal:  Soft, Non-tender, + BS	  Skin: No rashes, No ecchymoses, No cyanosis  Neurologic: Non-focal  Extremities: Normal range of motion, No clubbing, cyanosis or edema  Vascular: Peripheral pulses palpable 2+ bilaterally  Procedure Site:  Right groin and right radial:   No bleeding, no bruising, no hematoma, +PP no edema.     TELEMETRY: 	   - Bhavesh overnight, MD Hall aware    LABS:	 	                       13.4   5.6   )-----------( 248      ( 2018 05:32 )             42.1     142  |  102  |  18.0  ----------------------------<  112  3.8   |  26.0  |  1.29  Ca    8.9      2018 05:32  TPro  7.4  /  Alb  4.4  /  TBili  0.3<L>  /  DBili  x   /  AST  20  /  ALT  13  /  AlkPhos  44  06-25

## 2018-06-26 NOTE — PROGRESS NOTE ADULT - SUBJECTIVE AND OBJECTIVE BOX
[facility]    [id number]    53y      Male    INTERVAL HPI/OVERNIGHT EVENTS: Offers no complaints.    Hospital course:  53 M h/o HTN presented with intermittent chest pressure for 2 weeks. Reports that it is located at the left chest wall, and is worse when he is physically active, 7/10, associated with dyspnea. Has not seen his PMD for over a year.  In the ED, EKG was SR. Cardiac cath showed nonobstructive CAD, TTE LVEF 40-45%.     REVIEW OF SYSTEMS:    CONSTITUTIONAL: No fever   RESPIRATORY: No cough   CARDIOVASCULAR: No chest pain     Vital Signs Last 24 Hrs  T(C): 37.1 (26 Jun 2018 10:02), Max: 37.4 (25 Jun 2018 21:22)  T(F): 98.8 (26 Jun 2018 10:02), Max: 99.3 (25 Jun 2018 21:22)  HR: 65 (26 Jun 2018 10:02) (49 - 65)  BP: 114/64 (26 Jun 2018 10:02) (114/64 - 180/100)  BP(mean): --  RR: 18 (26 Jun 2018 10:02) (17 - 18)  SpO2: 94% (26 Jun 2018 05:42) (94% - 100%)    PHYSICAL EXAM:    GENERAL: NAD   HEENT: PERRL, +EOMI  CHEST/LUNG: Clear to percussion bilaterally   HEART: S1S2+, Regular rate and rhythm   ABDOMEN: Soft, Nontender, Nondistended; Bowel sounds present          LABS:                        13.4   5.6   )-----------( 248      ( 25 Jun 2018 05:32 )             42.1     06-25    142  |  102  |  18.0  ----------------------------<  112  3.8   |  26.0  |  1.29    Ca    8.9      25 Jun 2018 05:32    TPro  7.4  /  Alb  4.4  /  TBili  0.3<L>  /  DBili  x   /  AST  20  /  ALT  13  /  AlkPhos  44  06-25            MEDICATIONS  (STANDING):  carvedilol 6.25 milliGRAM(s) Oral every 12 hours  furosemide   Injectable 20 milliGRAM(s) IV Push two times a day  lisinopril 10 milliGRAM(s) Oral daily    MEDICATIONS  (PRN):      RADIOLOGY & ADDITIONAL TESTS:

## 2018-06-26 NOTE — PROGRESS NOTE ADULT - SUBJECTIVE AND OBJECTIVE BOX
Events noted  no acute complaints feels better      TELE: SB 55-60 bpm    MEDICATIONS  (STANDING):  carvedilol 6.25 milliGRAM(s) Oral every 12 hours  furosemide   Injectable 20 milliGRAM(s) IV Push two times a day  lisinopril 10 milliGRAM(s) Oral daily    MEDICATIONS  (PRN):      Allergies    No Known Allergies    Intolerances      PAST MEDICAL & SURGICAL HISTORY:  Hypertension  Asthma  No significant past surgical history      Vital Signs Last 24 Hrs  T(C): 37 (26 Jun 2018 05:42), Max: 37.4 (25 Jun 2018 21:22)  T(F): 98.6 (26 Jun 2018 05:42), Max: 99.3 (25 Jun 2018 21:22)  HR: 49 (26 Jun 2018 05:42) (49 - 62)  BP: 122/72 (26 Jun 2018 05:42) (122/72 - 180/100)  BP(mean): 120 (25 Jun 2018 11:52) (120 - 120)  RR: 18 (26 Jun 2018 05:42) (17 - 18)  SpO2: 94% (26 Jun 2018 05:42) (94% - 100%)    Physical Exam:  Constitutional: NAD, AAOx3  Cardiovascular: +S1S2 RRR  Pulmonary: CTA b/l, unlabored  Abd: soft NTND +BS  Groins: C/D/I bilaterally; no bleeding, hematoma, edema  Extremities: no pedal edema, +distal pulses b/l  Neuro: non focal, DAILY x4    LABS:                        13.4   5.6   )-----------( 248      ( 25 Jun 2018 05:32 )             42.1     06-25    142  |  102  |  18.0  ----------------------------<  112  3.8   |  26.0  |  1.29    Ca    8.9      25 Jun 2018 05:32    TPro  7.4  /  Alb  4.4  /  TBili  0.3<L>  /  DBili  x   /  AST  20  /  ALT  13  /  AlkPhos  44  06-25          Cardiac cath: non-obstructive cad    < from: TTE Echo Complete w/Doppler (06.25.18 @ 16:25) >    Summary:   1. Technically difficult study.   2. Endocardial visualization was enhanced with intravenous echo contrast.   3. Mildly decreased global left ventricular systolic function. Left   ventricular ejection fraction, by visual estimation, is 40 to 45%.   4. There is mild concentric left ventricular hypertrophy.   5. Spectral Doppler shows impaired relaxation pattern of left   ventricular myocardial filling (Grade I diastolic dysfunction).   6. The right ventricle was not well visualized.   7. Mild mitral valve regurgitation.   8. There is no evidence of pericardial effusion.   9. ** No prior echocardiograms available for comparison.    [id number] Ashanti Pisano DO, Electronically signed on 6/25/2018 at     < end of copied text >

## 2018-06-27 VITALS
DIASTOLIC BLOOD PRESSURE: 76 MMHG | TEMPERATURE: 98 F | HEART RATE: 58 BPM | SYSTOLIC BLOOD PRESSURE: 114 MMHG | OXYGEN SATURATION: 98 % | RESPIRATION RATE: 18 BRPM

## 2018-06-27 LAB
ANION GAP SERPL CALC-SCNC: 14 MMOL/L — SIGNIFICANT CHANGE UP (ref 5–17)
BUN SERPL-MCNC: 26 MG/DL — HIGH (ref 8–20)
CALCIUM SERPL-MCNC: 9.3 MG/DL — SIGNIFICANT CHANGE UP (ref 8.6–10.2)
CHLORIDE SERPL-SCNC: 101 MMOL/L — SIGNIFICANT CHANGE UP (ref 98–107)
CO2 SERPL-SCNC: 28 MMOL/L — SIGNIFICANT CHANGE UP (ref 22–29)
CREAT SERPL-MCNC: 1.39 MG/DL — HIGH (ref 0.5–1.3)
GLUCOSE SERPL-MCNC: 127 MG/DL — HIGH (ref 70–115)
MAGNESIUM SERPL-MCNC: 2.3 MG/DL — SIGNIFICANT CHANGE UP (ref 1.6–2.6)
POTASSIUM SERPL-MCNC: 3.8 MMOL/L — SIGNIFICANT CHANGE UP (ref 3.5–5.3)
POTASSIUM SERPL-SCNC: 3.8 MMOL/L — SIGNIFICANT CHANGE UP (ref 3.5–5.3)
SODIUM SERPL-SCNC: 143 MMOL/L — SIGNIFICANT CHANGE UP (ref 135–145)

## 2018-06-27 PROCEDURE — 83880 ASSAY OF NATRIURETIC PEPTIDE: CPT

## 2018-06-27 PROCEDURE — C1894: CPT

## 2018-06-27 PROCEDURE — 99153 MOD SED SAME PHYS/QHP EA: CPT

## 2018-06-27 PROCEDURE — 82550 ASSAY OF CK (CPK): CPT

## 2018-06-27 PROCEDURE — 80048 BASIC METABOLIC PNL TOTAL CA: CPT

## 2018-06-27 PROCEDURE — 99285 EMERGENCY DEPT VISIT HI MDM: CPT | Mod: 25

## 2018-06-27 PROCEDURE — 85027 COMPLETE CBC AUTOMATED: CPT

## 2018-06-27 PROCEDURE — 80053 COMPREHEN METABOLIC PANEL: CPT

## 2018-06-27 PROCEDURE — 71046 X-RAY EXAM CHEST 2 VIEWS: CPT

## 2018-06-27 PROCEDURE — 84484 ASSAY OF TROPONIN QUANT: CPT

## 2018-06-27 PROCEDURE — C1887: CPT

## 2018-06-27 PROCEDURE — 99233 SBSQ HOSP IP/OBS HIGH 50: CPT

## 2018-06-27 PROCEDURE — 93005 ELECTROCARDIOGRAM TRACING: CPT

## 2018-06-27 PROCEDURE — 99152 MOD SED SAME PHYS/QHP 5/>YRS: CPT

## 2018-06-27 PROCEDURE — 99239 HOSP IP/OBS DSCHRG MGMT >30: CPT

## 2018-06-27 PROCEDURE — 83735 ASSAY OF MAGNESIUM: CPT

## 2018-06-27 PROCEDURE — 83690 ASSAY OF LIPASE: CPT

## 2018-06-27 PROCEDURE — 93460 R&L HRT ART/VENTRICLE ANGIO: CPT

## 2018-06-27 PROCEDURE — 93306 TTE W/DOPPLER COMPLETE: CPT

## 2018-06-27 PROCEDURE — 36415 COLL VENOUS BLD VENIPUNCTURE: CPT

## 2018-06-27 PROCEDURE — 82553 CREATINE MB FRACTION: CPT

## 2018-06-27 RX ORDER — LISINOPRIL 2.5 MG/1
1 TABLET ORAL
Qty: 30 | Refills: 0
Start: 2018-06-27 | End: 2018-07-26

## 2018-06-27 RX ORDER — CARVEDILOL PHOSPHATE 80 MG/1
1 CAPSULE, EXTENDED RELEASE ORAL
Qty: 60 | Refills: 0
Start: 2018-06-27 | End: 2018-07-26

## 2018-06-27 RX ORDER — FUROSEMIDE 40 MG
20 TABLET ORAL DAILY
Qty: 0 | Refills: 0 | Status: DISCONTINUED | OUTPATIENT
Start: 2018-06-28 | End: 2018-06-27

## 2018-06-27 RX ORDER — FUROSEMIDE 40 MG
1 TABLET ORAL
Qty: 30 | Refills: 0
Start: 2018-06-27 | End: 2018-07-26

## 2018-06-27 RX ADMIN — LISINOPRIL 10 MILLIGRAM(S): 2.5 TABLET ORAL at 06:06

## 2018-06-27 RX ADMIN — CARVEDILOL PHOSPHATE 6.25 MILLIGRAM(S): 80 CAPSULE, EXTENDED RELEASE ORAL at 06:07

## 2018-06-27 RX ADMIN — Medication 20 MILLIGRAM(S): at 06:07

## 2018-06-27 NOTE — PROGRESS NOTE ADULT - SUBJECTIVE AND OBJECTIVE BOX
[facility]    [id number]    53y      Male    CC: chest pain    INTERVAL HPI/OVERNIGHT EVENTS:  53 M h/o HTN presented with intermittent chest pressure for 2 weeks. Reports that it is located at the left chest wall, and is worse when he is physically active, 7/10, associated with dyspnea. Has not seen his PMD for over a year.  In the ED, EKG was SR. Cardiac cath showed nonobstructive CAD, TTE LVEF 40-45%.     pt denies any pain no events overnight.    REVIEW OF SYSTEMS:    CONSTITUTIONAL: No fever, weight loss, or fatigue  RESPIRATORY: No cough, wheezing, hemoptysis; No shortness of breath  CARDIOVASCULAR: No chest pain, palpitations  GASTROINTESTINAL: No abdominal or epigastric pain. No nausea, vomiting      Vital Signs Last 24 Hrs  T(C): 37 (27 Jun 2018 10:02), Max: 37 (26 Jun 2018 21:24)  T(F): 98.6 (27 Jun 2018 10:02), Max: 98.6 (26 Jun 2018 21:24)  HR: 59 (27 Jun 2018 10:02) (57 - 67)  BP: 119/86 (27 Jun 2018 10:02) (112/64 - 122/76)  BP(mean): --  RR: 18 (27 Jun 2018 10:02) (18 - 18)  SpO2: 97% (27 Jun 2018 06:04) (97% - 97%)    PHYSICAL EXAM:    GENERAL: NAD, well-groomed  HEENT: PERRL, +EOMI  CHEST/LUNG: Clear to auscultation bilaterally; No wheezing  HEART: S1S2+, Regular rate and rhythm; No murmurs  ABDOMEN: Soft, Nontender, Nondistended; Bowel sounds present  EXTREMITIES:  2+ Peripheral Pulses, No clubbing, cyanosis, or edema        MEDICATIONS  (STANDING):  carvedilol 6.25 milliGRAM(s) Oral every 12 hours  lisinopril 10 milliGRAM(s) Oral daily    MEDICATIONS  (PRN):      RADIOLOGY & ADDITIONAL TESTS:  2decho:  PHYSICIAN INTERPRETATION:  Left Ventricle: Endocardial visualization was enhanced with intravenous   echo contrast. The left ventricular internal cavity size is normal. Left   ventricular wall thickness is mildly increased.  Global LV systolic function was mildly decreased. Left ventricular   ejection fraction, by visual estimation, is 40 to 45%. Spectral Doppler   shows impaired relaxation pattern of left ventricular myocardial filling   (Grade I diastolic dysfunction).  Right Ventricle: The right ventricle was not well visualized.  Left Atrium: Mild to moderately enlarged left atrium.  Pericardium: There is no evidence of pericardial effusion. There is a   significant pericardial fat pad present.  Mitral Valve: The mitral valve is normal in structure. Mitral leaflet   mobility is normal. No evidence of mitral stenosis. Mild mitral valve   regurgitation is seen.  Tricuspid Valve: The tricuspid valve is normal. Trivial tricuspid   regurgitation is visualized. Adequate TR velocity was not obtained to   accurately assess RVSP.  Aortic Valve: Normal trileaflet aortic valve with normal opening. No   evidence of aortic stenosis. No evidence of aortic valve regurgitation is   seen.  Pulmonic Valve: The pulmonic valve was not well visualized. Trace   pulmonic valve regurgitation.  Aorta: The aortic root is normal in size and structure. The ascending   aorta was not well visualized. The aortic arch was not well visualized.   Aortic root measured at Sinus of Valsalva is normal.  Pulmonary Artery: The pulmonary artery is of normal size and origin.  Venous: The pulmonary veins were not well visualized. The inferior vena   cava is not well visualized.       Summary:   1. Technically difficult study.   2. Endocardial visualization was enhanced with intravenous echo contrast.   3. Mildly decreased global left ventricular systolic function. Left   ventricular ejection fraction, by visual estimation, is 40 to 45%.   4. There is mild concentric left ventricular hypertrophy.   5. Spectral Doppler shows impaired relaxation pattern of left   ventricular myocardial filling (Grade I diastolic dysfunction).   6. The right ventricle was not well visualized.   7. Mild mitral valve regurgitation.   8. There is no evidence of pericardial effusion.   9. ** No prior echocardiograms available for comparison.    [id number] Ashanti Pisano DO, Electronically signed on 6/25/2018 at   6:32:10 PM

## 2018-06-27 NOTE — PROGRESS NOTE ADULT - SUBJECTIVE AND OBJECTIVE BOX
Patient with rate beat 130's, SVT.  MD Hall aware, patient asymptomatic per nursing, just started on coreg today, plan is to d.c and follow up outpatient.

## 2018-06-29 PROBLEM — I10 ESSENTIAL (PRIMARY) HYPERTENSION: Chronic | Status: ACTIVE | Noted: 2018-06-25

## 2018-07-09 ENCOUNTER — NON-APPOINTMENT (OUTPATIENT)
Age: 53
End: 2018-07-09

## 2018-07-09 ENCOUNTER — APPOINTMENT (OUTPATIENT)
Dept: CARDIOLOGY | Facility: CLINIC | Age: 53
End: 2018-07-09
Payer: MEDICARE

## 2018-07-09 VITALS
DIASTOLIC BLOOD PRESSURE: 74 MMHG | WEIGHT: 289 LBS | HEIGHT: 71 IN | OXYGEN SATURATION: 93 % | BODY MASS INDEX: 40.46 KG/M2 | SYSTOLIC BLOOD PRESSURE: 118 MMHG | HEART RATE: 68 BPM

## 2018-07-09 PROCEDURE — 99214 OFFICE O/P EST MOD 30 MIN: CPT

## 2018-07-09 PROCEDURE — 93000 ELECTROCARDIOGRAM COMPLETE: CPT

## 2018-10-10 ENCOUNTER — APPOINTMENT (OUTPATIENT)
Dept: CARDIOLOGY | Facility: CLINIC | Age: 53
End: 2018-10-10

## 2018-10-22 ENCOUNTER — APPOINTMENT (OUTPATIENT)
Dept: CARDIOLOGY | Facility: CLINIC | Age: 53
End: 2018-10-22
Payer: MEDICARE

## 2018-10-22 ENCOUNTER — NON-APPOINTMENT (OUTPATIENT)
Age: 53
End: 2018-10-22

## 2018-10-22 VITALS
HEART RATE: 82 BPM | DIASTOLIC BLOOD PRESSURE: 77 MMHG | OXYGEN SATURATION: 95 % | HEIGHT: 71 IN | BODY MASS INDEX: 40.6 KG/M2 | WEIGHT: 290 LBS | SYSTOLIC BLOOD PRESSURE: 122 MMHG

## 2018-10-22 PROCEDURE — 99214 OFFICE O/P EST MOD 30 MIN: CPT

## 2018-10-22 PROCEDURE — 93000 ELECTROCARDIOGRAM COMPLETE: CPT

## 2018-10-24 ENCOUNTER — MEDICATION RENEWAL (OUTPATIENT)
Age: 53
End: 2018-10-24

## 2018-12-31 ENCOUNTER — APPOINTMENT (OUTPATIENT)
Dept: CARDIOLOGY | Facility: CLINIC | Age: 53
End: 2018-12-31
Payer: MEDICARE

## 2018-12-31 PROCEDURE — 93306 TTE W/DOPPLER COMPLETE: CPT

## 2019-04-19 ENCOUNTER — RX RENEWAL (OUTPATIENT)
Age: 54
End: 2019-04-19

## 2019-05-21 RX ORDER — CARVEDILOL 6.25 MG/1
6.25 TABLET, FILM COATED ORAL TWICE DAILY
Qty: 180 | Refills: 1 | Status: ACTIVE | COMMUNITY
Start: 2018-07-09

## 2019-06-24 ENCOUNTER — APPOINTMENT (OUTPATIENT)
Dept: CARDIOLOGY | Facility: CLINIC | Age: 54
End: 2019-06-24

## 2019-09-18 ENCOUNTER — NON-APPOINTMENT (OUTPATIENT)
Age: 54
End: 2019-09-18

## 2019-09-18 ENCOUNTER — APPOINTMENT (OUTPATIENT)
Dept: CARDIOLOGY | Facility: CLINIC | Age: 54
End: 2019-09-18
Payer: MEDICARE

## 2019-09-18 VITALS
WEIGHT: 303 LBS | DIASTOLIC BLOOD PRESSURE: 85 MMHG | BODY MASS INDEX: 42.42 KG/M2 | HEART RATE: 80 BPM | HEIGHT: 71 IN | SYSTOLIC BLOOD PRESSURE: 125 MMHG | OXYGEN SATURATION: 98 % | RESPIRATION RATE: 16 BRPM

## 2019-09-18 DIAGNOSIS — Z01.818 ENCOUNTER FOR OTHER PREPROCEDURAL EXAMINATION: ICD-10-CM

## 2019-09-18 DIAGNOSIS — Z00.00 ENCOUNTER FOR GENERAL ADULT MEDICAL EXAMINATION W/OUT ABNORMAL FINDINGS: ICD-10-CM

## 2019-09-18 PROCEDURE — 99214 OFFICE O/P EST MOD 30 MIN: CPT

## 2019-09-18 PROCEDURE — 93000 ELECTROCARDIOGRAM COMPLETE: CPT

## 2019-09-18 RX ORDER — LISINOPRIL 10 MG/1
10 TABLET ORAL DAILY
Qty: 90 | Refills: 1 | Status: DISCONTINUED | COMMUNITY
Start: 2018-07-09 | End: 2019-09-18

## 2019-09-18 NOTE — PHYSICAL EXAM
[Normal Conjunctiva] : the conjunctiva exhibited no abnormalities [Eyelids - No Xanthelasma] : the eyelids demonstrated no xanthelasmas [Normal Oral Mucosa] : normal oral mucosa [No Oral Pallor] : no oral pallor [No Oral Cyanosis] : no oral cyanosis [Normal Jugular Venous A Waves Present] : normal jugular venous A waves present [No Jugular Venous Fernández A Waves] : no jugular venous fernández A waves [Normal Jugular Venous V Waves Present] : normal jugular venous V waves present [FreeTextEntry1] : Obese, soft, nontender. [Nail Clubbing] : no clubbing of the fingernails [Gait - Sufficient For Exercise Testing] : the gait was sufficient for exercise testing [Abnormal Walk] : normal gait [Cyanosis, Localized] : no localized cyanosis [Petechial Hemorrhages (___cm)] : no petechial hemorrhages [Skin Color & Pigmentation] : normal skin color and pigmentation [No Venous Stasis] : no venous stasis [Skin Lesions] : no skin lesions [] : no rash [No Skin Ulcers] : no skin ulcer [No Xanthoma] : no  xanthoma was observed [Oriented To Time, Place, And Person] : oriented to person, place, and time [Affect] : the affect was normal [No Anxiety] : not feeling anxious [Mood] : the mood was normal

## 2019-09-18 NOTE — ASSESSMENT
[FreeTextEntry1] : ECG-Normal sinus rhythm at 80 beats per minute, LVH, left atrial enlargement, poor progression,\par \par Lab data 6/14/19 ( No Statin)\par Chol. 299\par LDL  196\par HDL    47\par Tri.    191\par \par Creat 1.34\par \par Impression:\par 1. Nonischemic cardiomyopathy. Etiology unclear possibly multifactorial. The role of chronic morbid obesity, untreated sleep apnea, long-standing hypertension, all need to be considered. This was explained to the patient in some detail.\par 2. The current pharmacologic regimen seems to be fairly appropriate. Whether or not his various somatic complaints related to medications or not it is a bit unclear. Sleep disturbance may be more a function of untreated sleep apnea.\par Visual disturbances perhaps should be evaluated by an ophthalmologist.\par 3. History of morbid obesity and weight is down about 50 pounds from his high 3-4 years ago.\par \par 4. No evidence of coronary artery disease on the basis of angiography one year ago.\par \par 5. Severe pulmonary artery hypertension possibly related to sleep apnea.\par \par 6. Marked hyperlipidemia recently begun on atorvastatin and follow up labs should be obtained.\par \par \par Plan\par - Has been extensively educated on his history of nonischemic cardiomyopathy and aware that his carvedilol  should not be changed this time . In  regards to  his need for afterload reduction rx, we will change his Lisnopril to Valsartan 80 mg daily. He is to call the office with an update in 3 weeks. \par \par - Will schedule a sleep study with pulm. follow up\par \par - Blood work to recheck  lipid panel.\par \par - Futher weight loss

## 2019-09-18 NOTE — HISTORY OF PRESENT ILLNESS
[FreeTextEntry1] : In 2016 as advised he completed a sleep study which showed moderate sleep apnea with an overall  AHI of 24.7 events/HR\par In June of 2018 he was hospitalized for chest pain where a cardiac catheterization was performed revealing no CAD , PA pressures of 63/27 and a wedge pressure of 29.\par  \par In hospital  echocardiogram revealed a decreased EF 40-45% with mildly decreased left ventricular systolic function and mild left ventricular hypertrophy.\par \par Soon after he was started on Carvedilol, Lisinopril and Furosemide  which he continues to take. \par \par Pt states he was unaware of the abnormalities seen on hi cardiac studies. \par \par There is a history of bariatric surgery with a total weight loss of 50 lbs.\par \par Blood work collected in June of 2019 Chol. 299, , Tri. 191 and started on Atorvastatin 40 mg  soon after by PCP Dr. Winn\par \par

## 2019-09-18 NOTE — REASON FOR VISIT
[FreeTextEntry1] : This is a 54 year old male patient presenting for cardiac revelation last seen by Dr. Calderon in 2017 for intermittent atypical chest pain. \par He was hospitalized June 2018 for a chest pain syndrome.\par Cardiac catheterization demonstrated absence of any significant coronary artery disease.\par The filling pressures were, however, quite elevated with moderately severe pulmonary artery hypertension as well as a high pulmonary capillary wedge pressure.\par \par The patient was subsequently started on beta blockers, ACE inhibitors and diuretics. He followed up with with the [facility] group last year.\par \par He is currently concerned about a variety of issues which he attributes to the medication and would like to try to stop some of his meds.\par \par These include sleep disturbance, visual disturbance and some vague poorly defined discomfort in his body.\par \par He does not exercise, being limited by some orthopedic issues as recently it is his ankle.\par His medical/cardiac history includes:\par \par 1. Pericarditis in 2016\par 2. Long standing morbid obesity\par 3. Nonischemic cardiomyopathy\par 4. CAD\par 5. Hyperlipemia\par 6. Bariatric surgery\par \par

## 2019-10-25 ENCOUNTER — OUTPATIENT (OUTPATIENT)
Dept: OUTPATIENT SERVICES | Facility: HOSPITAL | Age: 54
LOS: 1 days | End: 2019-10-25
Payer: COMMERCIAL

## 2019-10-25 DIAGNOSIS — G47.30 SLEEP APNEA, UNSPECIFIED: ICD-10-CM

## 2019-10-25 DIAGNOSIS — G47.33 OBSTRUCTIVE SLEEP APNEA (ADULT) (PEDIATRIC): ICD-10-CM

## 2019-10-25 PROCEDURE — G0399: CPT

## 2019-10-25 PROCEDURE — 95806 SLEEP STUDY UNATT&RESP EFFT: CPT | Mod: 26

## 2019-10-25 PROCEDURE — 95806 SLEEP STUDY UNATT&RESP EFFT: CPT

## 2019-10-27 ENCOUNTER — EMERGENCY (EMERGENCY)
Facility: HOSPITAL | Age: 54
LOS: 1 days | Discharge: HOME | End: 2019-10-27
Attending: EMERGENCY MEDICINE
Payer: COMMERCIAL

## 2019-10-27 VITALS
WEIGHT: 296.96 LBS | HEIGHT: 71 IN | TEMPERATURE: 98 F | OXYGEN SATURATION: 98 % | SYSTOLIC BLOOD PRESSURE: 142 MMHG | RESPIRATION RATE: 20 BRPM | HEART RATE: 74 BPM | DIASTOLIC BLOOD PRESSURE: 93 MMHG

## 2019-10-27 PROCEDURE — 73610 X-RAY EXAM OF ANKLE: CPT | Mod: 26,RT

## 2019-10-27 PROCEDURE — 99284 EMERGENCY DEPT VISIT MOD MDM: CPT

## 2019-10-27 PROCEDURE — 99283 EMERGENCY DEPT VISIT LOW MDM: CPT

## 2019-10-27 PROCEDURE — 73630 X-RAY EXAM OF FOOT: CPT

## 2019-10-27 PROCEDURE — 73630 X-RAY EXAM OF FOOT: CPT | Mod: 26,RT

## 2019-10-27 PROCEDURE — 73610 X-RAY EXAM OF ANKLE: CPT

## 2019-10-27 NOTE — ED STATDOCS - PATIENT PORTAL LINK FT
You can access the FollowMyHealth Patient Portal offered by [facility] by registering at the following website: [email or web address]. By joining [employer]’s FollowMyHealth portal, you will also be able to view your health information using other applications (apps) compatible with our system.

## 2019-10-27 NOTE — ED STATDOCS - ATTENDING CONTRIBUTION TO CARE
54 year old with 10 days of right ankle and heel pain diagnosed with his PMD with sprain.  No deformity noted. 2+ distal pulses.  Xray negative for fracture.  He was give medication for pain and instructed to follow-up with Orthopedics.

## 2019-10-27 NOTE — ED STATDOCS - OBJECTIVE STATEMENT
53 y/o M c/o pain in right ankle and heel x 10 days.  Patient states that he twisted his ankle while walking on 10/16.  Patient saw his PMD and went to urgent care and was told 2x that it was sprained.  Patient has been able to walk on it.  Denies head trauma or any other injuries.

## 2019-10-27 NOTE — ED ADULT TRIAGE NOTE - CHIEF COMPLAINT QUOTE
c/o slipped and hurt right ankle on 10/16 went to urgent care on 10/18 , was told to see his pmd was told he had a sprained ankle

## 2019-11-01 ENCOUNTER — OUTPATIENT (OUTPATIENT)
Dept: OUTPATIENT SERVICES | Facility: HOSPITAL | Age: 54
LOS: 1 days | End: 2019-11-01
Payer: MEDICARE

## 2019-11-01 PROCEDURE — G9001: CPT

## 2019-11-08 DIAGNOSIS — Z71.89 OTHER SPECIFIED COUNSELING: ICD-10-CM

## 2019-11-25 ENCOUNTER — OUTPATIENT (OUTPATIENT)
Dept: OUTPATIENT SERVICES | Facility: HOSPITAL | Age: 54
LOS: 1 days | End: 2019-11-25
Payer: COMMERCIAL

## 2019-11-25 DIAGNOSIS — G47.33 OBSTRUCTIVE SLEEP APNEA (ADULT) (PEDIATRIC): ICD-10-CM

## 2019-11-25 PROCEDURE — 95811 POLYSOM 6/>YRS CPAP 4/> PARM: CPT | Mod: 26

## 2019-11-25 PROCEDURE — 95811 POLYSOM 6/>YRS CPAP 4/> PARM: CPT

## 2019-12-16 ENCOUNTER — APPOINTMENT (OUTPATIENT)
Dept: PULMONOLOGY | Facility: CLINIC | Age: 54
End: 2019-12-16

## 2019-12-19 ENCOUNTER — APPOINTMENT (OUTPATIENT)
Dept: CARDIOLOGY | Facility: CLINIC | Age: 54
End: 2019-12-19

## 2019-12-23 ENCOUNTER — APPOINTMENT (OUTPATIENT)
Dept: CARDIOLOGY | Facility: CLINIC | Age: 54
End: 2019-12-23

## 2020-02-17 ENCOUNTER — APPOINTMENT (OUTPATIENT)
Dept: CARDIOLOGY | Facility: CLINIC | Age: 55
End: 2020-02-17
Payer: MEDICARE

## 2020-02-17 PROCEDURE — 93306 TTE W/DOPPLER COMPLETE: CPT

## 2020-02-17 RX ORDER — PERFLUTREN 6.52 MG/ML
6.52 INJECTION, SUSPENSION INTRAVENOUS
Qty: 3 | Refills: 0 | Status: COMPLETED | OUTPATIENT
Start: 2020-02-17

## 2020-02-17 RX ADMIN — PERFLUTREN MG/ML: 6.52 INJECTION, SUSPENSION INTRAVENOUS at 00:00

## 2020-02-20 NOTE — REASON FOR VISIT
[FreeTextEntry1] : This is a 54 year old male patient presenting for cardiac revelation last seen by Dr. Calderon in 2017 for intermittent atypical chest pain. \par He was hospitalized June 2018 for a chest pain syndrome.\par Cardiac catheterization demonstrated absence of any significant coronary artery disease.\par The filling pressures were, however, quite elevated with moderately severe pulmonary artery hypertension as well as a high pulmonary capillary wedge pressure.\par \par The patient was subsequently started on beta blockers, ACE inhibitors and diuretics. He followed up with with the [facility] group last year.\par \par He is currently concerned about a variety of issues which he attributes to the medication and would like to try to stop some of his meds.\par \par These include sleep disturbance, visual disturbance and some vague poorly defined discomfort in his body.\par \par He does not exercise, being limited by some orthopedic issues as recently it is his ankle.\par His medical/cardiac history includes:\par \par 1. Pericarditis in 2016\par 2. Long standing morbid obesity\par 3. Nonischemic cardiomyopathy\par 4. CAD\par 5. Hyperlipemia\par 6. Bariatric surgery\par \par

## 2020-02-20 NOTE — PHYSICAL EXAM
[Normal Conjunctiva] : the conjunctiva exhibited no abnormalities [Eyelids - No Xanthelasma] : the eyelids demonstrated no xanthelasmas [Normal Oral Mucosa] : normal oral mucosa [No Oral Pallor] : no oral pallor [Normal Jugular Venous A Waves Present] : normal jugular venous A waves present [No Oral Cyanosis] : no oral cyanosis [Normal Jugular Venous V Waves Present] : normal jugular venous V waves present [No Jugular Venous Fernández A Waves] : no jugular venous fernández A waves [Abnormal Walk] : normal gait [FreeTextEntry1] : Obese, soft, nontender. [Gait - Sufficient For Exercise Testing] : the gait was sufficient for exercise testing [Nail Clubbing] : no clubbing of the fingernails [Petechial Hemorrhages (___cm)] : no petechial hemorrhages [Cyanosis, Localized] : no localized cyanosis [Skin Color & Pigmentation] : normal skin color and pigmentation [No Venous Stasis] : no venous stasis [] : no rash [Skin Lesions] : no skin lesions [No Skin Ulcers] : no skin ulcer [Oriented To Time, Place, And Person] : oriented to person, place, and time [No Xanthoma] : no  xanthoma was observed [Affect] : the affect was normal [Mood] : the mood was normal [No Anxiety] : not feeling anxious

## 2020-02-20 NOTE — ASSESSMENT
[FreeTextEntry1] : ECG-Normal sinus rhythm at 80 beats per minute, LVH, left atrial enlargement, poor progression,\par \par Lab Data 10/21/2019 Dr. Winn\par Chol: 177\par HDL: 54\par LDL: 102\par Tr\par Creatine: 1.28\par \par Lab data 19 ( No Statin)\par Chol. 299\par LDL  196\par HDL    47\par Tri.    191\par \par Creat 1.34\par \par Impression:\par 1. Nonischemic cardiomyopathy. Etiology unclear possibly multifactorial. The role of chronic morbid obesity, untreated sleep apnea, long-standing hypertension, all need to be considered. This was explained to the patient in some detail.\par 2. The current pharmacologic regimen seems to be fairly appropriate. Whether or not his various somatic complaints related to medications or not it is a bit unclear. Sleep disturbance may be more a function of untreated sleep apnea.\par Visual disturbances perhaps should be evaluated by an ophthalmologist.\par 3. History of morbid obesity and weight is down about 50 pounds from his high 3-4 years ago.\par \par 4. No evidence of coronary artery disease on the basis of angiography one year ago.\par \par 5. Severe pulmonary artery hypertension possibly related to sleep apnea.\par \par 6. Marked hyperlipidemia recently begun on atorvastatin and follow up labs should be obtained.\par \par \par Plan\par - Has been extensively educated on his history of nonischemic cardiomyopathy and aware that his carvedilol  should not be changed this time . In  regards to  his need for afterload reduction rx, we will change his Lisnopril to Valsartan 80 mg daily. He is to call the office with an update in 3 weeks. \par \par - Will schedule a sleep study with pulm. follow up\par \par - Blood work to recheck  lipid panel.\par \par - Futher weight loss

## 2020-02-24 ENCOUNTER — APPOINTMENT (OUTPATIENT)
Dept: CARDIOLOGY | Facility: CLINIC | Age: 55
End: 2020-02-24

## 2020-03-02 NOTE — PHYSICAL EXAM
[Normal Conjunctiva] : the conjunctiva exhibited no abnormalities [Normal Oral Mucosa] : normal oral mucosa [Eyelids - No Xanthelasma] : the eyelids demonstrated no xanthelasmas [No Oral Pallor] : no oral pallor [No Oral Cyanosis] : no oral cyanosis [Normal Jugular Venous A Waves Present] : normal jugular venous A waves present [Normal Jugular Venous V Waves Present] : normal jugular venous V waves present [No Jugular Venous Fernández A Waves] : no jugular venous fernández A waves [FreeTextEntry1] : Obese, soft, nontender. [Abnormal Walk] : normal gait [Gait - Sufficient For Exercise Testing] : the gait was sufficient for exercise testing [Nail Clubbing] : no clubbing of the fingernails [Cyanosis, Localized] : no localized cyanosis [Petechial Hemorrhages (___cm)] : no petechial hemorrhages [Skin Color & Pigmentation] : normal skin color and pigmentation [] : no rash [No Xanthoma] : no  xanthoma was observed [Skin Lesions] : no skin lesions [No Venous Stasis] : no venous stasis [No Skin Ulcers] : no skin ulcer [Oriented To Time, Place, And Person] : oriented to person, place, and time [Mood] : the mood was normal [Affect] : the affect was normal [No Anxiety] : not feeling anxious

## 2020-03-02 NOTE — REASON FOR VISIT
[FreeTextEntry1] : This is a 54 year old male patient presenting for cardiac revelation last seen by Dr. Calderon in 2017 for intermittent atypical chest pain. \par He was hospitalized June 2018 for a chest pain syndrome.\par Cardiac catheterization demonstrated absence of any significant coronary artery disease.\par The filling pressures were, however, quite elevated with moderately severe pulmonary artery hypertension as well as a high pulmonary capillary wedge pressure.\par \par The patient was subsequently started on beta blockers, ACE inhibitors and diuretics. He followed up with with the [facility] group last year.\par \par He is currently concerned about a variety of issues which he attributes to the medication and would like to try to stop some of his meds.\par \par These include sleep disturbance, visual disturbance and some vague poorly defined discomfort in his body.\par \par He does not exercise, being limited by some orthopedic issues as recently it is his ankle.\par His medical/cardiac history includes:\par \par 1. Pericarditis in 2016\par 2. Long standing morbid obesity\par 3. Nonischemic cardiomyopathy\par 4. CAD\par 5. Hyperlipemia\par 6. Bariatric surgery\par \par

## 2020-03-03 ENCOUNTER — APPOINTMENT (OUTPATIENT)
Dept: CARDIOLOGY | Facility: CLINIC | Age: 55
End: 2020-03-03

## 2020-03-13 NOTE — ASSESSMENT
[FreeTextEntry1] : ECG-Normal sinus rhythm at 80 beats per minute, LVH, left atrial enlargement, poor progression,\par \par Lab Data 10/21/2019\par Chol: 177\par HDL; 54\par LDL: 102\par Tr\par Creat: 1.28\par \par Lab data 19 ( No Statin)\par Chol. 299\par LDL  196\par HDL    47\par Tri.    191\par \par Creat 1.34\par \par Impression:\par 1. Nonischemic cardiomyopathy. Etiology unclear possibly multifactorial. The role of chronic morbid obesity, untreated sleep apnea, long-standing hypertension, all need to be considered. This was explained to the patient in some detail.\par 2. The current pharmacologic regimen seems to be fairly appropriate. Whether or not his various somatic complaints related to medications or not it is a bit unclear. Sleep disturbance may be more a function of untreated sleep apnea.\par Visual disturbances perhaps should be evaluated by an ophthalmologist.\par 3. History of morbid obesity and weight is down about 50 pounds from his high 3-4 years ago.\par \par 4. No evidence of coronary artery disease on the basis of angiography one year ago.\par \par 5. Severe pulmonary artery hypertension possibly related to sleep apnea.\par \par 6. Marked hyperlipidemia recently begun on atorvastatin and follow up labs should be obtained.\par \par \par Plan\par - Has been extensively educated on his history of nonischemic cardiomyopathy and aware that his carvedilol  should not be changed this time . In  regards to  his need for afterload reduction rx, we will change his Lisnopril to Valsartan 80 mg daily. He is to call the office with an update in 3 weeks. \par \par - Will schedule a sleep study with pulm. follow up\par \par - Blood work to recheck  lipid panel.\par \par - Futher weight loss

## 2020-03-13 NOTE — REASON FOR VISIT
[FreeTextEntry1] : This is a 54 year old male patient presenting for cardiac revelation last seen by Dr. Calderon in 2017 for intermittent atypical chest pain. \par He was hospitalized June 2018 for a chest pain syndrome.\par Cardiac catheterization demonstrated absence of any significant coronary artery disease.\par The filling pressures were, however, quite elevated with moderately severe pulmonary artery hypertension as well as a high pulmonary capillary wedge pressure.\par \par The patient was subsequently started on beta blockers, ACE inhibitors and diuretics. He followed up with with the [facility] group last year.\par \par He is currently concerned about a variety of issues which he attributes to the medication and would like to try to stop some of his meds.\par \par These include sleep disturbance, visual disturbance and some vague poorly defined discomfort in his body.\par \par He does not exercise, being limited by some orthopedic issues as recently it is his ankle.\par His medical/cardiac history includes:\par \par 1. Pericarditis in 2016\par 2. Long standing morbid obesity\par 3. Nonischemic cardiomyopathy\par 4. CAD\par 5. Hyperlipemia\par 6. Bariatric surgery\par \par

## 2020-03-13 NOTE — PHYSICAL EXAM
[Normal Conjunctiva] : the conjunctiva exhibited no abnormalities [Eyelids - No Xanthelasma] : the eyelids demonstrated no xanthelasmas [Normal Oral Mucosa] : normal oral mucosa [No Oral Pallor] : no oral pallor [No Oral Cyanosis] : no oral cyanosis [Normal Jugular Venous A Waves Present] : normal jugular venous A waves present [Normal Jugular Venous V Waves Present] : normal jugular venous V waves present [No Jugular Venous Fernández A Waves] : no jugular venous fernández A waves [FreeTextEntry1] : Obese, soft, nontender. [Abnormal Walk] : normal gait [Gait - Sufficient For Exercise Testing] : the gait was sufficient for exercise testing [Nail Clubbing] : no clubbing of the fingernails [Cyanosis, Localized] : no localized cyanosis [Petechial Hemorrhages (___cm)] : no petechial hemorrhages [Skin Color & Pigmentation] : normal skin color and pigmentation [] : no rash [No Venous Stasis] : no venous stasis [Skin Lesions] : no skin lesions [No Skin Ulcers] : no skin ulcer [No Xanthoma] : no  xanthoma was observed [Oriented To Time, Place, And Person] : oriented to person, place, and time [Affect] : the affect was normal [Mood] : the mood was normal [No Anxiety] : not feeling anxious

## 2020-03-13 NOTE — REASON FOR VISIT
[FreeTextEntry1] : This is a 54 year old male patient presenting for cardiac revelation last seen by Dr. Calderon in 2017 for intermittent atypical chest pain. \par He was hospitalized June 2018 for a chest pain syndrome.\par Cardiac catheterization demonstrated absence of any significant coronary artery disease.\par The filling pressures were, however, quite elevated with moderately severe pulmonary artery hypertension as well as a high pulmonary capillary wedge pressure.\par \par The patient was subsequently started on beta blockers, ACE inhibitors and diuretics. He followed up with with the [facility] group last year.\par \par He is currently concerned about a variety of issues which he attributes to the medication and would like to try to stop some of his meds.\par \par These include sleep disturbance, visual disturbance and some vague poorly defined discomfort in his body.\par \par He does not exercise, being limited by some orthopedic issues as recently it is his ankle.\par His medical/cardiac history includes:\par \par 1. Pericarditis in 2016\par 2. Long standing morbid obesity\par 3. Nonischemic cardiomyopathy\par 4. CAD\par 5. Hyperlipemia\par 6. Bariatric surgery\par \par

## 2020-03-16 ENCOUNTER — APPOINTMENT (OUTPATIENT)
Dept: CARDIOLOGY | Facility: CLINIC | Age: 55
End: 2020-03-16

## 2020-03-25 ENCOUNTER — EMERGENCY (EMERGENCY)
Facility: HOSPITAL | Age: 55
LOS: 1 days | Discharge: HOME | End: 2020-03-25
Attending: EMERGENCY MEDICINE
Payer: COMMERCIAL

## 2020-03-25 VITALS
TEMPERATURE: 98 F | OXYGEN SATURATION: 98 % | SYSTOLIC BLOOD PRESSURE: 170 MMHG | DIASTOLIC BLOOD PRESSURE: 110 MMHG | RESPIRATION RATE: 18 BRPM | WEIGHT: 315 LBS | HEART RATE: 80 BPM

## 2020-03-25 VITALS
DIASTOLIC BLOOD PRESSURE: 91 MMHG | HEART RATE: 71 BPM | SYSTOLIC BLOOD PRESSURE: 155 MMHG | OXYGEN SATURATION: 97 % | RESPIRATION RATE: 20 BRPM

## 2020-03-25 LAB
ALBUMIN SERPL ELPH-MCNC: 4.3 G/DL — SIGNIFICANT CHANGE UP (ref 3.3–5.2)
ALP SERPL-CCNC: 43 U/L — SIGNIFICANT CHANGE UP (ref 40–120)
ALT FLD-CCNC: 23 U/L — SIGNIFICANT CHANGE UP
ANION GAP SERPL CALC-SCNC: 14 MMOL/L — SIGNIFICANT CHANGE UP (ref 5–17)
AST SERPL-CCNC: 23 U/L — SIGNIFICANT CHANGE UP
BILIRUB SERPL-MCNC: 0.3 MG/DL — LOW (ref 0.4–2)
BUN SERPL-MCNC: 18 MG/DL — SIGNIFICANT CHANGE UP (ref 8–20)
CALCIUM SERPL-MCNC: 9 MG/DL — SIGNIFICANT CHANGE UP (ref 8.6–10.2)
CHLORIDE SERPL-SCNC: 99 MMOL/L — SIGNIFICANT CHANGE UP (ref 98–107)
CO2 SERPL-SCNC: 29 MMOL/L — SIGNIFICANT CHANGE UP (ref 22–29)
CREAT SERPL-MCNC: 1.18 MG/DL — SIGNIFICANT CHANGE UP (ref 0.5–1.3)
GLUCOSE SERPL-MCNC: 109 MG/DL — HIGH (ref 70–99)
HCT VFR BLD CALC: 45.3 % — SIGNIFICANT CHANGE UP (ref 39–50)
HGB BLD-MCNC: 14.2 G/DL — SIGNIFICANT CHANGE UP (ref 13–17)
MAGNESIUM SERPL-MCNC: 1.8 MG/DL — SIGNIFICANT CHANGE UP (ref 1.6–2.6)
MCHC RBC-ENTMCNC: 28.2 PG — SIGNIFICANT CHANGE UP (ref 27–34)
MCHC RBC-ENTMCNC: 31.3 GM/DL — LOW (ref 32–36)
MCV RBC AUTO: 89.9 FL — SIGNIFICANT CHANGE UP (ref 80–100)
NT-PROBNP SERPL-SCNC: 505 PG/ML — HIGH (ref 0–300)
PLATELET # BLD AUTO: 210 K/UL — SIGNIFICANT CHANGE UP (ref 150–400)
POTASSIUM SERPL-MCNC: 3.5 MMOL/L — SIGNIFICANT CHANGE UP (ref 3.5–5.3)
POTASSIUM SERPL-SCNC: 3.5 MMOL/L — SIGNIFICANT CHANGE UP (ref 3.5–5.3)
PROT SERPL-MCNC: 7.6 G/DL — SIGNIFICANT CHANGE UP (ref 6.6–8.7)
RAPID RVP RESULT: SIGNIFICANT CHANGE UP
RBC # BLD: 5.04 M/UL — SIGNIFICANT CHANGE UP (ref 4.2–5.8)
RBC # FLD: 15.5 % — HIGH (ref 10.3–14.5)
SODIUM SERPL-SCNC: 142 MMOL/L — SIGNIFICANT CHANGE UP (ref 135–145)
TROPONIN T SERPL-MCNC: <0.01 NG/ML — SIGNIFICANT CHANGE UP (ref 0–0.06)
WBC # BLD: 5.56 K/UL — SIGNIFICANT CHANGE UP (ref 3.8–10.5)
WBC # FLD AUTO: 5.56 K/UL — SIGNIFICANT CHANGE UP (ref 3.8–10.5)

## 2020-03-25 PROCEDURE — 99285 EMERGENCY DEPT VISIT HI MDM: CPT

## 2020-03-25 PROCEDURE — 99284 EMERGENCY DEPT VISIT MOD MDM: CPT | Mod: 25

## 2020-03-25 PROCEDURE — 85027 COMPLETE CBC AUTOMATED: CPT

## 2020-03-25 PROCEDURE — 83880 ASSAY OF NATRIURETIC PEPTIDE: CPT

## 2020-03-25 PROCEDURE — 93005 ELECTROCARDIOGRAM TRACING: CPT

## 2020-03-25 PROCEDURE — 83735 ASSAY OF MAGNESIUM: CPT

## 2020-03-25 PROCEDURE — 93010 ELECTROCARDIOGRAM REPORT: CPT

## 2020-03-25 PROCEDURE — 80053 COMPREHEN METABOLIC PANEL: CPT

## 2020-03-25 PROCEDURE — 71045 X-RAY EXAM CHEST 1 VIEW: CPT | Mod: 26

## 2020-03-25 PROCEDURE — 36415 COLL VENOUS BLD VENIPUNCTURE: CPT

## 2020-03-25 PROCEDURE — 84484 ASSAY OF TROPONIN QUANT: CPT

## 2020-03-25 PROCEDURE — 87581 M.PNEUMON DNA AMP PROBE: CPT

## 2020-03-25 PROCEDURE — 87633 RESP VIRUS 12-25 TARGETS: CPT

## 2020-03-25 PROCEDURE — 71045 X-RAY EXAM CHEST 1 VIEW: CPT

## 2020-03-25 PROCEDURE — 87486 CHLMYD PNEUM DNA AMP PROBE: CPT

## 2020-03-25 PROCEDURE — 87635 SARS-COV-2 COVID-19 AMP PRB: CPT

## 2020-03-25 PROCEDURE — 87798 DETECT AGENT NOS DNA AMP: CPT

## 2020-03-25 NOTE — ED ADULT NURSE NOTE - NSIMPLEMENTINTERV_GEN_ALL_ED
Implemented All Universal Safety Interventions:  [place] to call system. Call bell, personal items and telephone within reach. Instruct patient to call for assistance. Room bathroom lighting operational. Non-slip footwear when patient is off stretcher. Physically safe environment: no spills, clutter or unnecessary equipment. Stretcher in lowest position, wheels locked, appropriate side rails in place.
Implemented All Universal Safety Interventions:  [place] to call system. Call bell, personal items and telephone within reach. Instruct patient to call for assistance. Room bathroom lighting operational. Non-slip footwear when patient is off stretcher. Physically safe environment: no spills, clutter or unnecessary equipment. Stretcher in lowest position, wheels locked, appropriate side rails in place.

## 2020-03-25 NOTE — ED PROVIDER NOTE - CLINICAL SUMMARY MEDICAL DECISION MAKING FREE TEXT BOX
The patient presents with chest pain and SOB similar to his previous pleural effusion but most likely has COVID-19  The patient refuses further work up and wants to go home and wants to s/o AMA and the patient understands the risks of s/o AMA including recurrent chest pain to death

## 2020-03-25 NOTE — ED PROVIDER NOTE - CROS ED CARDIOVAS ALL NEG
The EHR was down on 06/25/17 from 0030 to 0600.    Per System Policy, some elements of documentation have been back entered into the EHR.  Please refer to the scanned documents for any additional documentation.    Flory Wilson RN     - - -

## 2020-03-25 NOTE — ED PROVIDER NOTE - PATIENT PORTAL LINK FT
You can access the FollowMyHealth Patient Portal offered by [facility] by registering at the following website: [email or web address]. By joining [facility]’s FollowMyHealth portal, you will also be able to view your health information using other applications (apps) compatible with our system.

## 2020-03-25 NOTE — ED ADULT NURSE NOTE - OBJECTIVE STATEMENT
assumed pt care at 0800. Pt A&Ox 4. States he stopped taking lasix on Sunday and developed SOB with some intermittent chest pain yesterday. Pt states he took lasix this AM and now feels 100% better. Pt denies any sick contacts or fever. Currently denies any chest pain, SOB, N/V/D, headaches, dizziness, blurred vision, numbness/tingling,  symptoms. No s/s of respiratory distress noted. Pt ambulatory in ED with steady gait. NSR on monitor. Pt in isolation room for r/o covid 19. Safety maintained. Will continue to monitor.
assumed pt care at 0815. Pt A&Ox 4. States she woke up in the middle of the night last night with left shoulder pain and has had generalized weakness x 2 days. Pt denies any chest pain, SOB, N/V/D, headaches, dizziness, blurred vision, numbness/tingling,  symptoms. No s/s of respiratory distress noted. NSR on monitor. Pt ambulatory in ED with steady gait. Safety maintained. Will continue to monitor.

## 2020-03-25 NOTE — ED ADULT TRIAGE NOTE - CHIEF COMPLAINT QUOTE
Patient is alert and oriented x3 c/o shortness of breath with chest pain. also c/o neck pain and generalized body aches. denies fevers or chills. denies cough. breathing unlabored. talking in complete sentences with out difficulty.

## 2020-03-26 LAB — SARS-COV-2 RNA SPEC QL NAA+PROBE: SIGNIFICANT CHANGE UP

## 2020-07-13 NOTE — ED PROVIDER NOTE - EYES NEGATIVE STATEMENT, MLM
Patient notified and verbalized understanding. She stated if this doesn't help she will call her stomach doctor.    no discharge, no irritation, no pain, no redness, and no visual changes.

## 2020-08-25 ENCOUNTER — NON-APPOINTMENT (OUTPATIENT)
Age: 55
End: 2020-08-25

## 2020-08-25 ENCOUNTER — APPOINTMENT (OUTPATIENT)
Dept: CARDIOLOGY | Facility: CLINIC | Age: 55
End: 2020-08-25
Payer: MEDICARE

## 2020-08-25 VITALS
DIASTOLIC BLOOD PRESSURE: 94 MMHG | RESPIRATION RATE: 16 BRPM | HEART RATE: 66 BPM | SYSTOLIC BLOOD PRESSURE: 148 MMHG | WEIGHT: 315 LBS | HEIGHT: 71 IN | TEMPERATURE: 98.3 F | BODY MASS INDEX: 44.1 KG/M2 | OXYGEN SATURATION: 98 %

## 2020-08-25 PROCEDURE — 93000 ELECTROCARDIOGRAM COMPLETE: CPT

## 2020-08-25 PROCEDURE — 99214 OFFICE O/P EST MOD 30 MIN: CPT

## 2020-08-25 RX ORDER — ERGOCALCIFEROL 1.25 MG/1
1.25 MG CAPSULE, LIQUID FILLED ORAL
Refills: 0 | Status: DISCONTINUED | COMMUNITY
End: 2020-08-25

## 2020-08-25 RX ORDER — MULTIVIT-MIN/FOLIC/VIT K/LYCOP 400-300MCG
25 MCG TABLET ORAL
Refills: 0 | Status: DISCONTINUED | COMMUNITY
End: 2020-08-25

## 2020-08-25 NOTE — PHYSICAL EXAM
[Normal Conjunctiva] : the conjunctiva exhibited no abnormalities [Eyelids - No Xanthelasma] : the eyelids demonstrated no xanthelasmas [Normal Oral Mucosa] : normal oral mucosa [No Oral Pallor] : no oral pallor [No Oral Cyanosis] : no oral cyanosis [Normal Jugular Venous A Waves Present] : normal jugular venous A waves present [No Jugular Venous Fernández A Waves] : no jugular venous fernández A waves [Normal Jugular Venous V Waves Present] : normal jugular venous V waves present [Abnormal Walk] : normal gait [Gait - Sufficient For Exercise Testing] : the gait was sufficient for exercise testing [Nail Clubbing] : no clubbing of the fingernails [Cyanosis, Localized] : no localized cyanosis [Petechial Hemorrhages (___cm)] : no petechial hemorrhages [Skin Color & Pigmentation] : normal skin color and pigmentation [] : no rash [No Venous Stasis] : no venous stasis [Skin Lesions] : no skin lesions [No Skin Ulcers] : no skin ulcer [No Xanthoma] : no  xanthoma was observed [Oriented To Time, Place, And Person] : oriented to person, place, and time [Affect] : the affect was normal [Mood] : the mood was normal [No Anxiety] : not feeling anxious [FreeTextEntry1] : Obese, soft, nontender.

## 2020-08-25 NOTE — REVIEW OF SYSTEMS
[Recent Weight Gain (___ Lbs)] : recent [unfilled] ~Ulb weight gain [Blurry Vision] : blurred vision [FreeTextEntry1] : Other than as documented here and in the HPI, the thirteen point ROS is negative

## 2020-08-25 NOTE — REASON FOR VISIT
[FreeTextEntry1] : This is a 55  year old male patient presenting for cardiac re- evaluation \par \par Hospitalized June 2018 for a chest pain syndrome.\par Cardiac catheterization demonstrated absence of any significant coronary artery disease.\par The cardiac filling pressures were, however, quite elevated with moderately severe pulmonary artery hypertension as well as a high pulmonary capillary wedge pressure.\par \par The patient was subsequently started on beta blockers, ACE inhibitors and diuretics. He initially followed up with with the [place] cardiology group last year.\par \par He is currently concerned about a variety of issues, including blurred vision, which he attributes to the medication and has tried to stop some of his meds.\par \par These include sleep disturbance, visual disturbance and some vague poorly defined discomfort in his body.\par Admits to considerable weight gain while sheltering at home during the pandemic\par \par He does not exercise, being limited by some orthopedic issues as recently it is his ankle.\par His medical/cardiac history includes:\par \par 1. Pericarditis in 2016\par 2. Long standing morbid obesity\par 3. Nonischemic cardiomyopathy\par 4. CAD\par 5. Hyperlipemia\par 6. Bariatric surgery\par \par

## 2020-08-25 NOTE — ASSESSMENT
[FreeTextEntry1] : ECG-Normal sinus rhythm at 66 beats per minute, LVH, left atrial enlargement, poor progression,diffuse nonsp T wave flattening\par \par Lab data 6/14/19 ( No Statin)\par Chol. 299\par LDL  196\par HDL    47\par Tri.    191\par \par Creat 1.34\par \par Echo 2/17/20\par LVH with mildly reduced LVEF 45%\par Diast Filling abn\par LAE\par PAP = 39 mm Hg\par Asc aorta 4 cm\par \par Impression:\par 1. Nonischemic cardiomyopathy. Etiology unclear possibly multifactorial. The role of chronic morbid obesity, untreated sleep apnea, long-standing hypertension, all need to be considered. This was explained to the patient in some detail.\par \par 2. Whether or not his various somatic complaints related to medications or not it is a bit unclear. Sleep disturbance may be more a function of untreated sleep apnea.\par Visual disturbances perhaps should be evaluated by an ophthalmologist.Previously recommended\par \par 3. History of morbid obesity and weight is up 35 lbs from the last visit\par \par 4. No evidence of sig coronary artery disease on the basis of angiography one year ago.\par \par 5. Severe pulmonary artery hypertension by cath, milder on echo, possibly related to sleep apnea.\par \par 6. Marked hyperlipidemia recently begun on atorvastatin and follow up labs should be obtained.\par \par 7. Untreated JESSICA with no pulm f/u in place after sleep study\par \par 8. Noncompliance with meds and increased BP\par \par Plan\par - Has been extensively educated on his history of nonischemic cardiomyopathy and aware that his carvedilol  should not be changed this time . In  regards to  his need for afterload reduction rx, we will change his Valsartan 80  to 160 mg daily. He is to call the office with an update in 3 weeks. \par \par - Will schedule a sleep study f/u with pulm. f\par \par - Blood work to recheck  lipid panel.\par \par - Weight loss strongly emphasized\par

## 2020-09-08 ENCOUNTER — APPOINTMENT (OUTPATIENT)
Dept: PULMONOLOGY | Facility: CLINIC | Age: 55
End: 2020-09-08
Payer: MEDICARE

## 2020-09-08 VITALS
WEIGHT: 315 LBS | OXYGEN SATURATION: 98 % | BODY MASS INDEX: 44.1 KG/M2 | SYSTOLIC BLOOD PRESSURE: 140 MMHG | DIASTOLIC BLOOD PRESSURE: 96 MMHG | HEART RATE: 74 BPM | HEIGHT: 71 IN

## 2020-09-08 PROCEDURE — 99204 OFFICE O/P NEW MOD 45 MIN: CPT

## 2020-09-08 NOTE — PROCEDURE
[FreeTextEntry1] : PFTs performed previously revealed a mildly reduced FVC and FEV1 without an obstructive pattern. Lung volumes revealed a mildly reduced TLC. Diffusion capacity was normal. Overall, his lung fxn was at baseline and revealed only mild restriction.\par PFTs performed subsequently again revealed a restrictive pattern without a significant BD response. Lung volumes were mildly reduced with a normal diffusion capacity. This spirometry today is slightly reduced c/w his previous study.

## 2020-09-08 NOTE — REVIEW OF SYSTEMS
[Dry Eyes] : dryness of the eyes [Cough] : cough [Dyspnea] : dyspnea [Wheezing] : wheezing [Chest Tightness] : chest tightness [Hypertension] : ~T hypertension [Itchy Eyes] : itching of ~T the eyes [Dysuria] : dysuria [Back Pain] : ~T back pain [Depression] : depression [As Noted in HPI] : as noted in HPI [Fever] : no fever [Eye Irritation] : no ~T irritation of the eyes [Chills] : no chills [Nasal Congestion] : no nasal congestion [Epistaxis] : no nosebleeds [Postnasal Drip] : no postnasal drip [Sinus Problems] : no sinus problems [Sputum] : not coughing up ~M sputum [Hemoptysis] : no hemoptysis [Pleuritic Pain] : no pleuritic pain [Chest Discomfort] : no chest discomfort [Dysrhythmia] : no dysrhythmia [Murmurs] : no murmurs were heard [Palpitations] : no palpitations [Edema] : ~T edema was not present [Reflux] : no reflux [Hay Fever] : no hay fever [Vomiting] : no vomiting [Nausea] : no nausea [Constipation] : no constipation [Frequency] : no change in urinary frequency [Diarrhea] : no diarrhea [Abdominal Pain] : no abdominal pain [Trauma] : no ~T physical trauma [Fracture] : no fracture [Headache] : no headache [Anemia] : no anemia [Dizziness] : no dizziness [Syncope] : no fainting [Numbness] : no numbness [Paralysis] : no paralysis was seen [Seizures] : no seizures [Anxiety] : no anxiety [Diabetes] : no diabetes mellitus [Thyroid Problem] : no thyroid problem

## 2020-09-08 NOTE — DISCUSSION/SUMMARY
[FreeTextEntry1] : \par #1. Diet and exercise for weight loss\par #2. Consider Advair but for now continue as needed ProAir for wheeze and exertional SOB\par #3. Exertional SOB and restrictive pattern on spirometry are likely weight related \par #4. Prior CXR from 2018 was clear by report\par #5. Restart AutoCPAP 7-16 cm of water for moderate JESSICA; improved from previous with weight loss\par #6.  F/u in one month after starting CPAP therapy for reevaluation and compliance and PFTs\par #7. Consider eventual MCT to r/o asthma if intermittent SOB and wheeze persist\par #8. The reduction in spirometry seen previously may be related to heat and humidity. I will continue to monitor periodically.\par #9. Consider bariatric surgery re-evaluation given weight gain; also recommended nutritionist lawrence\par #10. Reviewed risks of exposure and symptoms of Covid-19 virus, including how the virus is spread.\par \par Discussed the following risk-reducing strategies:\par -Wash hands with soap and water (proper technique reviewed) \par -Use alcohol based  when hand-washing is not an option\par -Maintain a social distance of at least 6 feet whenever possible\par -Avoid contact, especially hand shaking\par -Avoid touching eyes, nose, and mouth\par -Cover face/mouth when coughing or sneezing\par -Avoid close contact with sick people\par -Seek medical help if you develop a fever and/or respiratory symptoms (e.g. cough, chest pain, SOB)\par \par Patient's questions were answered and patient appears to understand these recommendations\par

## 2020-09-08 NOTE — CONSULT LETTER
[Maciel Shah MD] : Maciel Shah MD [DrMary  ___] : Dr. MONROE [Dear  ___] : Dear  [unfilled], [Consult Letter:] : I had the pleasure of evaluating your patient, [unfilled]. [Please see my note below.] : Please see my note below. [Consult Closing:] : Thank you very much for allowing me to participate in the care of this patient.  If you have any questions, please do not hesitate to contact me. [FreeTextEntry3] : Maciel Shah MD, FCCP, D. ABSM\par Pulmonary and Sleep Medicine\par [facility] [place] [Sincerely,] : Sincerely,

## 2020-09-08 NOTE — PHYSICAL EXAM
[Normal Conjunctiva] : the conjunctiva exhibited no abnormalities [Normal Appearance] : normal appearance [Low Lying Soft Palate] : low lying soft palate [Elongated Uvula] : elongated uvula [Enlarged Base of the Tongue] : enlargement of the base of the tongue [IV] : IV [Neck Appearance] : the appearance of the neck was normal [Heart Rate And Rhythm] : heart rate and rhythm were normal [Murmurs] : no murmurs present [Heart Sounds] : normal S1 and S2 [] : no respiratory distress [Respiration, Rhythm And Depth] : normal respiratory rhythm and effort [Edema] : no peripheral edema present [Exaggerated Use Of Accessory Muscles For Inspiration] : no accessory muscle use [Auscultation Breath Sounds / Voice Sounds] : lungs were clear to auscultation bilaterally [Abnormal Walk] : normal gait [Abdomen Tenderness] : non-tender [Abdomen Soft] : soft [Oriented To Time, Place, And Person] : oriented to person, place, and time [Normal Oral Mucosa] : normal oral mucosa [Floppy] : a floppy soft palate [Normal Oropharynx] : normal oropharynx [Macroglossia] : was enlarged (macroglossia) [Redundant] : redundant mucosal folds [Nail Clubbing] : no clubbing of the fingernails [Cyanosis, Localized] : no localized cyanosis [FreeTextEntry1] : Severe oropharyngeal crowding.

## 2020-09-08 NOTE — REASON FOR VISIT
[Initial Evaluation] : an initial evaluation [Shortness of Breath] : shortness of Breath [Sleep Apnea] : sleep apnea [FreeTextEntry2] : morbid obesity, clearance

## 2020-09-08 NOTE — HISTORY OF PRESENT ILLNESS
[Follow-Up] : follow-up of [Currently Experiencing] : The patient is currently experiencing symptoms. [Inhaled Albuterol] : inhaled albuterol [None] : The patient is currently asymptomatic [CPAP] : CPAP [Good Compliance] : good compliance with treatment [Good Symptom Control] : good symptom control [Good Tolerance] : good tolerance of treatment [Follow-Up - Routine Clinic] : a routine clinic follow-up of [Excess Weight] : excess weight [Dyspnea] : dyspnea [Inability to Lose Weight] : inability to lose weight [Exercise Regimen] : exercise regimen [Back Pain] : back pain [Low Calorie Diet] : low calorie diet [Fair Compliance] : fair compliance with treatment [Fair Tolerance] : fair tolerance of treatment [Fair Symptom Control] : fair symptom control [Sleep Apnea] : sleep apnea [High] : high [Low Calorie] : low calorie [FreeTextEntry1] : Patient reports occasional wheeze and SOB especially with exertion. He reports improvement with Advair which he is now only using as needed rather than BID as was prescribed.\par He was recently seen in [facility] for SOB but w/u was negative as per the patient and he was d/c'd without therapy. He now is back to baseline.\par SOB may be related to morbid obesity.\par He is s/p gastric sleeve surgery in 2016 with initial 60 lbs weight loss but gained 40 lbs back now. \par Pt was last seen in 2016. [de-identified] : intermittent wheeze, SOBOE, and intermittent chest tightness [Morning Headaches] : no morning headaches [Shortness of Breath] : no shortness of breath [de-identified] : AutoCPAP [Hypertension] : no hypertension [Diabetes] : no diabetes

## 2020-09-08 NOTE — COUNSELING
[Benefits of weight loss discussed] : Benefits of weight loss discussed [Encouraged to increase physical activity] : Encouraged to increase physical activity [Potential consequences of obesity discussed] : Potential consequences of obesity discussed

## 2020-09-30 ENCOUNTER — APPOINTMENT (OUTPATIENT)
Dept: GASTROENTEROLOGY | Facility: CLINIC | Age: 55
End: 2020-09-30
Payer: MEDICARE

## 2020-09-30 VITALS
SYSTOLIC BLOOD PRESSURE: 148 MMHG | TEMPERATURE: 98.7 F | BODY MASS INDEX: 44.1 KG/M2 | OXYGEN SATURATION: 98 % | HEIGHT: 71 IN | DIASTOLIC BLOOD PRESSURE: 108 MMHG | RESPIRATION RATE: 15 BRPM | HEART RATE: 64 BPM | WEIGHT: 315 LBS

## 2020-09-30 DIAGNOSIS — K43.9 VENTRAL HERNIA W/OUT OBSTRUCTION OR GANGRENE: ICD-10-CM

## 2020-09-30 DIAGNOSIS — Z78.9 OTHER SPECIFIED HEALTH STATUS: ICD-10-CM

## 2020-09-30 PROCEDURE — 99203 OFFICE O/P NEW LOW 30 MIN: CPT

## 2020-09-30 NOTE — HISTORY OF PRESENT ILLNESS
[de-identified] : This is a 55-year-old man with a past medical history of coronary artery disease, morbid obesity status post sleeve gastrectomy with weight regained, cardiomyopathy, obstructive sleep apnea, pulmonary artery hypertension, and hyperlipidemia who presents for evaluation for ventral hernia and mesh associated pain.  Patient reports that over the last several months his lower abdominal pain that he attributes to the mesh has been worsening.  He reports that he would like to have it removed.  He reports having undergone a colonoscopy approximately 5-6 years ago that was unremarkable.  He denies any family history of colorectal cancer.  He denies any significant changes in his bowel habits or rectal bleeding.  He was advised by his pulmonologist to see a bariatric surgeon for evaluation of his regained of weight.

## 2020-09-30 NOTE — ASSESSMENT
[FreeTextEntry1] : Refer to bariatric surgery who can likely address his mesh-associated pain and reevaluation for morbid obesity.

## 2020-09-30 NOTE — PHYSICAL EXAM
[General Appearance - Alert] : alert [General Appearance - In No Acute Distress] : in no acute distress [Sclera] : the sclera and conjunctiva were normal [PERRL With Normal Accommodation] : pupils were equal in size, round, and reactive to light [Extraocular Movements] : extraocular movements were intact [Outer Ear] : the ears and nose were normal in appearance [Hearing Threshold Finger Rub Not [place]] : hearing was normal [Neck Appearance] : the appearance of the neck was normal [Neck Cervical Mass (___cm)] : no neck mass was observed [Jugular Venous Distention Increased] : there was no jugular-venous distention [Thyroid Diffuse Enlargement] : the thyroid was not enlarged [Thyroid Nodule] : there were no palpable thyroid nodules [Heart Rate And Rhythm] : heart rate was normal and rhythm regular [Heart Sounds] : normal S1 and S2 [Auscultation Breath Sounds / Voice Sounds] : lungs were clear to auscultation bilaterally [Murmurs] : no murmurs [Heart Sounds Gallop] : no gallops [Heart Sounds Pericardial Friction Rub] : no pericardial rub [Edema] : there was no peripheral edema [Bowel Sounds] : normal bowel sounds [Abdomen Soft] : soft [Abdomen Tenderness] : non-tender [Cervical Lymph Nodes Enlarged Posterior Bilaterally] : posterior cervical [Abdomen Mass (___ Cm)] : no abdominal mass palpated [Cervical Lymph Nodes Enlarged Anterior Bilaterally] : anterior cervical [Nail Clubbing] : no clubbing  or cyanosis of the fingernails [Supraclavicular Lymph Nodes Enlarged Bilaterally] : supraclavicular [Skin Turgor] : normal skin turgor [Skin Color & Pigmentation] : normal skin color and pigmentation [No Focal Deficits] : no focal deficits [] : no rash [Affect] : the affect was normal [Impaired Insight] : insight and judgment were intact [Oriented To Time, Place, And Person] : oriented to person, place, and time

## 2020-10-08 ENCOUNTER — APPOINTMENT (OUTPATIENT)
Dept: PULMONOLOGY | Facility: CLINIC | Age: 55
End: 2020-10-08
Payer: MEDICARE

## 2020-10-08 VITALS
HEART RATE: 90 BPM | OXYGEN SATURATION: 96 % | BODY MASS INDEX: 44.1 KG/M2 | SYSTOLIC BLOOD PRESSURE: 136 MMHG | WEIGHT: 315 LBS | DIASTOLIC BLOOD PRESSURE: 84 MMHG | HEIGHT: 71 IN | RESPIRATION RATE: 16 BRPM

## 2020-10-08 PROCEDURE — 99214 OFFICE O/P EST MOD 30 MIN: CPT

## 2020-10-08 NOTE — HISTORY OF PRESENT ILLNESS
[Follow-Up] : follow-up of [Currently Experiencing] : The patient is currently experiencing symptoms. [Inhaled Albuterol] : inhaled albuterol [None] : The patient is currently asymptomatic [CPAP] : CPAP [Good Compliance] : good compliance with treatment [Good Tolerance] : good tolerance of treatment [Good Symptom Control] : good symptom control [Follow-Up - Routine Clinic] : a routine clinic follow-up of [Excess Weight] : excess weight [Inability to Lose Weight] : inability to lose weight [Dyspnea] : dyspnea [Back Pain] : back pain [Low Calorie Diet] : low calorie diet [Exercise Regimen] : exercise regimen [Fair Compliance] : fair compliance with treatment [Fair Tolerance] : fair tolerance of treatment [Fair Symptom Control] : fair symptom control [Sleep Apnea] : sleep apnea [High] : high [Low Calorie] : low calorie [FreeTextEntry1] : Patient reports occasional wheeze and SOB especially with exertion. He reports improvement with Advair which he is now only using as needed rather than BID as was prescribed.\par He was recently seen in [facility] for SOB but w/u was negative as per the patient and he was d/c'd without therapy. He now is back to baseline.\par SOB may be related to morbid obesity.\par He is s/p gastric sleeve surgery in 2016 with initial 60 lbs weight loss but gained 40 lbs back now. \par Pt was last seen in 2016. [de-identified] : intermittent wheeze, SOBOE, and intermittent chest tightness [Morning Headaches] : no morning headaches [Shortness of Breath] : no shortness of breath [de-identified] : AutoCPAP [Diabetes] : no diabetes [Hypertension] : no hypertension

## 2020-10-08 NOTE — REVIEW OF SYSTEMS
[Dry Eyes] : dryness of the eyes [Cough] : cough [Dyspnea] : dyspnea [Chest Tightness] : chest tightness [Wheezing] : wheezing [Hypertension] : ~T hypertension [Itchy Eyes] : itching of ~T the eyes [Dysuria] : dysuria [Back Pain] : ~T back pain [Depression] : depression [As Noted in HPI] : as noted in HPI [Fever] : no fever [Chills] : no chills [Eye Irritation] : no ~T irritation of the eyes [Nasal Congestion] : no nasal congestion [Epistaxis] : no nosebleeds [Postnasal Drip] : no postnasal drip [Sinus Problems] : no sinus problems [Sputum] : not coughing up ~M sputum [Hemoptysis] : no hemoptysis [Pleuritic Pain] : no pleuritic pain [Chest Discomfort] : no chest discomfort [Dysrhythmia] : no dysrhythmia [Murmurs] : no murmurs were heard [Palpitations] : no palpitations [Edema] : ~T edema was not present [Hay Fever] : no hay fever [Reflux] : no reflux [Nausea] : no nausea [Vomiting] : no vomiting [Constipation] : no constipation [Diarrhea] : no diarrhea [Abdominal Pain] : no abdominal pain [Frequency] : no change in urinary frequency [Trauma] : no ~T physical trauma [Fracture] : no fracture [Anemia] : no anemia [Headache] : no headache [Dizziness] : no dizziness [Syncope] : no fainting [Numbness] : no numbness [Paralysis] : no paralysis was seen [Seizures] : no seizures [Anxiety] : no anxiety [Diabetes] : no diabetes mellitus [Thyroid Problem] : no thyroid problem

## 2020-10-08 NOTE — DISCUSSION/SUMMARY
[FreeTextEntry1] : \par #1. Diet and exercise for weight loss\par #2. Consider Advair but for now continue as needed ProAir for wheeze and exertional SOB\par #3. Exertional SOB and restrictive pattern on spirometry are likely weight related \par #4. Prior CXR from 2018 was clear by report\par #5. Continue AutoCPAP 7-16 cm of water for moderate JESSICA; improved from previous with weight loss\par #6.  F/u in one month for reevaluation with compliance and PFTs\par #7. Consider eventual MCT to r/o asthma if intermittent SOB and wheeze persist\par #8. The reduction in spirometry seen previously may be related to heat and humidity. I will continue to monitor periodically.\par #9. Consider bariatric surgery re-evaluation given weight gain; also recommended nutritionist eval\par #10. Replace equipment as needed; ordered 9/8/20\par #11. Reviewed risks of exposure and symptoms of Covid-19 virus, including how the virus is spread.\par \par Discussed the following risk-reducing strategies:\par -Wash hands with soap and water (proper technique reviewed) \par -Use alcohol based  when hand-washing is not an option\par -Maintain a social distance of at least 6 feet whenever possible\par -Avoid contact, especially hand shaking\par -Avoid touching eyes, nose, and mouth\par -Cover face/mouth when coughing or sneezing\par -Avoid close contact with sick people\par -Seek medical help if you develop a fever and/or respiratory symptoms (e.g. cough, chest pain, SOB)\par \par Patient's questions were answered and patient appears to understand these recommendations\par

## 2020-10-08 NOTE — CONSULT LETTER
[Dear  ___] : Dear  [unfilled], [Consult Letter:] : I had the pleasure of evaluating your patient, [unfilled]. [Please see my note below.] : Please see my note below. [Consult Closing:] : Thank you very much for allowing me to participate in the care of this patient.  If you have any questions, please do not hesitate to contact me. [Sincerely,] : Sincerely, [DrMary  ___] : Dr. MONROE [Maciel Shah MD] : Maciel Shah MD [FreeTextEntry3] : Maciel Shah MD, FCCP, D. ABSM\par Pulmonary and Sleep Medicine\par [facility] at [place]

## 2020-10-08 NOTE — PHYSICAL EXAM
[Normal Appearance] : normal appearance [Normal Conjunctiva] : the conjunctiva exhibited no abnormalities [Low Lying Soft Palate] : low lying soft palate [Elongated Uvula] : elongated uvula [Enlarged Base of the Tongue] : enlargement of the base of the tongue [IV] : IV [Neck Appearance] : the appearance of the neck was normal [Heart Rate And Rhythm] : heart rate and rhythm were normal [Heart Sounds] : normal S1 and S2 [Murmurs] : no murmurs present [Edema] : no peripheral edema present [] : no respiratory distress [Respiration, Rhythm And Depth] : normal respiratory rhythm and effort [Exaggerated Use Of Accessory Muscles For Inspiration] : no accessory muscle use [Auscultation Breath Sounds / Voice Sounds] : lungs were clear to auscultation bilaterally [Abdomen Soft] : soft [Abdomen Tenderness] : non-tender [Abnormal Walk] : normal gait [Oriented To Time, Place, And Person] : oriented to person, place, and time [Normal Oral Mucosa] : normal oral mucosa [Normal Oropharynx] : normal oropharynx [Floppy] : a floppy soft palate [Redundant] : redundant mucosal folds [Macroglossia] : was enlarged (macroglossia) [Nail Clubbing] : no clubbing of the fingernails [Cyanosis, Localized] : no localized cyanosis [FreeTextEntry1] : Severe oropharyngeal crowding.

## 2020-10-12 ENCOUNTER — APPOINTMENT (OUTPATIENT)
Dept: SURGERY | Facility: CLINIC | Age: 55
End: 2020-10-12
Payer: MEDICARE

## 2020-10-12 VITALS
WEIGHT: 315 LBS | OXYGEN SATURATION: 98 % | HEART RATE: 76 BPM | DIASTOLIC BLOOD PRESSURE: 94 MMHG | SYSTOLIC BLOOD PRESSURE: 149 MMHG | TEMPERATURE: 97.3 F | HEIGHT: 71 IN | BODY MASS INDEX: 44.1 KG/M2

## 2020-10-12 PROCEDURE — 99204 OFFICE O/P NEW MOD 45 MIN: CPT

## 2020-10-12 PROCEDURE — 99244 OFF/OP CNSLTJ NEW/EST MOD 40: CPT

## 2020-10-12 RX ORDER — VALSARTAN 80 MG/1
80 TABLET, COATED ORAL
Qty: 90 | Refills: 0 | Status: DISCONTINUED | COMMUNITY
Start: 2020-04-08 | End: 2020-10-12

## 2020-10-12 RX ORDER — ALLOPURINOL 300 MG/1
300 TABLET ORAL
Qty: 30 | Refills: 0 | Status: ACTIVE | COMMUNITY
Start: 2020-09-30

## 2020-10-12 RX ORDER — PANTOPRAZOLE 40 MG/1
40 TABLET, DELAYED RELEASE ORAL
Qty: 90 | Refills: 0 | Status: DISCONTINUED | COMMUNITY
Start: 2020-03-24 | End: 2020-10-12

## 2020-10-12 NOTE — REVIEW OF SYSTEMS
[Fever] : no fever [Chills] : no chills [Feeling Poorly] : not feeling poorly [Feeling Tired] : not feeling tired [Eye Pain] : no eye pain [Red Eyes] : eyes not red [Eyesight Problems] : no eyesight problems [Discharge From Eyes] : no purulent discharge from the eyes [Earache] : no earache [Loss Of Hearing] : no hearing loss [Nosebleeds] : no nosebleeds [Nasal Discharge] : no nasal discharge [Heart Rate Is Slow] : the heart rate was not slow [Heart Rate Is Fast] : the heart rate was not fast [Chest Pain] : no chest pain [Palpitations] : no palpitations [Shortness Of Breath] : no shortness of breath [Wheezing] : no wheezing [Cough] : no cough [SOB on Exertion] : no shortness of breath during exertion [As Noted in HPI] : as noted in HPI [Dysuria] : no dysuria [Incontinence] : no incontinence [Hesitancy] : no urinary hesitancy [Nocturia] : no nocturia [Arthralgias] : no arthralgias [Joint Pain] : no joint pain [Joint Swelling] : no joint swelling [Joint Stiffness] : no joint stiffness [Skin Lesions] : no skin lesions [Skin Wound] : no skin wound [Confused] : no confusion [Convulsions] : no convulsions [Dizziness] : no dizziness [Fainting] : no fainting [Suicidal] : not suicidal [Sleep Disturbances] : no sleep disturbances [Proptosis] : no proptosis [Hot Flashes] : no hot flashes [Easy Bleeding] : no tendency for easy bleeding [Easy Bruising] : no tendency for easy bruising

## 2020-10-12 NOTE — PHYSICAL EXAM
[JVD] : no jugular venous distention  [Normal Thyroid] : the thyroid was normal [Normal Breath Sounds] : Normal breath sounds [Normal Heart Sounds] : normal heart sounds [Normal Rate and Rhythm] : normal rate and rhythm [Abdominal Masses] : No abdominal masses [Abdomen Tenderness] : ~T ~M No abdominal tenderness [No HSM] : no hepatosplenomegaly [No Rash or Lesion] : No rash or lesion [Purpura] : no purpura  [Petechiae] : no petechiae [Alert] : alert [Oriented to Person] : oriented to person [Oriented to Place] : oriented to place [Oriented to Time] : oriented to time [Calm] : calm [de-identified] : obese, no acute distress [de-identified] : BENJAMIN, moist membranes [de-identified] : soft, ND, NT, unable to assess groin due to habitus, +BS. [de-identified] : no CVAT [de-identified] : no obvious deformities, full ROM

## 2020-10-12 NOTE — ASSESSMENT
[FreeTextEntry1] : 55M with bilateral groin pain coinciding with weight gain, morbid obesity, previous umbilical hernia repair. Unable to assess whether pain is mesh related vs. new inguinal hernias, due to habitus. Will send for CT A/P to assess anatomy. Even if patient has hernias, I would recommend weight loss in order to make repair feasible and successful. Not interested in surgical options at this time. Will refer to dietician and medical bariatrics for ongoing support. \par Return to office following imaging.

## 2020-10-12 NOTE — HISTORY OF PRESENT ILLNESS
[de-identified] : BOWEN SIU is a 55 year old male , with history of CAD, sleeve gastrectomy for morbid obesity & ventral hernia repair with mesh done sometime between [id number], presents today with lower abdomen and groin pain, referred by Dr. Rolle for evaluation. Denies fever or chills. Tolerating diet. Reports diarrhea, no change in bladder habits. Reports that the discomfort increased as his weight increased during COVID lockdown. His pain is not located at site of previous repair.\par After his sleeve in 2016, reports his weight dropped from 355 to 307, subsequently regained. Is not interested in pursuing revisional options at this time.\par \par \par

## 2020-10-26 ENCOUNTER — APPOINTMENT (OUTPATIENT)
Dept: BARIATRICS/WEIGHT MGMT | Facility: CLINIC | Age: 55
End: 2020-10-26
Payer: MEDICARE

## 2020-10-26 VITALS
DIASTOLIC BLOOD PRESSURE: 94 MMHG | WEIGHT: 315 LBS | SYSTOLIC BLOOD PRESSURE: 152 MMHG | BODY MASS INDEX: 44.1 KG/M2 | OXYGEN SATURATION: 97 % | HEIGHT: 71 IN | TEMPERATURE: 97.3 F | HEART RATE: 76 BPM

## 2020-10-26 DIAGNOSIS — M10.9 GOUT, UNSPECIFIED: ICD-10-CM

## 2020-10-26 PROCEDURE — 99072 ADDL SUPL MATRL&STAF TM PHE: CPT

## 2020-10-26 PROCEDURE — 99205 OFFICE O/P NEW HI 60 MIN: CPT

## 2020-10-26 NOTE — HISTORY OF PRESENT ILLNESS
[Improved Health] : Improved health [Quality of Life] : Quality of life [Improved Mobility] : Improved mobility [Young Adult] : yound adult [Sleeve] : Bariatric surgery (sleeve) [Year of Surgery: _____] : Year of surgery: [unfilled] [Weight Before Surgery: ___] : Weight before surgery: [unfilled] [Maintenance Weight: ___] : Maintenance weight: [unfilled] [For how long: _____] : For how long: [unfilled] [Change in activity level] : change in activity level [Other: ____] : [unfilled] [9] : 9 [Every night] : I use a CPAP machine every night [I usually sleep 4-6 hours] : I usually sleep 4-6 hours [I work night shifts/I switch from nights to days schedule] : I work night shifts/I switch from nights to days schedule [Breakfast] : breakfast [Dinner] : dinner [Late night] : late night [Crunchy] : crunchy [Salty] : salty [Less than 1] : Fruit: less than 1 [6-8] : Meat, fish, poultry, eggs, cheese: 6-8 [1-2] : Sweets: 1-2 [2] : How many cups of juice do you drink per day: 2 [6] : How many cups of water do you regularly drink per day: 6 [1] : Cups of coffee per day: 1 [Artificial sweetener] : artificial sweetener [Spouse/partner] : spouse/partner [Frequent Snacks] : frequent snacks [Eat Fast] : eat fast [Skip Meals] : skip meals [Boredom Eating] : boredom eating [Other: _______] : [unfilled] [1 block] : Walking distance capability: 1 block [Walking] : walking [Sports] : sports [Cardio machines: treadmill/spinning/elliptical] : cardio machines: treadmill/spinning/elliptical [Other: ___] : [unfilled] [0] : 0 [None] : none [VSG] : VSG [HTN] : HTN [FreeTextEntry2] : 175 [FreeTextEntry3] : 105 [] : 2. Do you feel distressed about your episodes of excessive overeating?  No [FreeTextEntry1] : 55 year old gentleman here for aid with weight loss. \par Work hours\par M_W 3p to 11P ( eats at 12 and sleeps till 1: 30 p) to stop this week \par Thurs 10p to 6a ( eats at 7 am sleeps 7a to 1 pm\par Friday 11p to 7 a\par Sat 3p to 7a\par Sunday 3p to  11p \par Works night, 7x/week although he is limiting that starting next week\par Maintained the wt loss for about 2 years and then noticed a gradual increase. Notes more SOB with exertion with the weight  gain. Diagnosed with JESSICA and recently started to use CPAP and has noticed that he is feeling better during the awake hours. \par Home: 11pm\par Breakfast(7 am) 3 Turkmen toast sticks , 2 chicken sausage, glass cranberry juice, H2o\par Lunch: 2 pieces of fried chicken, 1/2 sweet potato, asparagus\par Snack : apple pie\par Dinner : same as lunch \par Snack after dinner : bag of potato chips

## 2020-10-26 NOTE — ASSESSMENT
[FreeTextEntry1] : 55 Year old gentleman with a Hx of obesity. Unfortunately , he works at night, has started to use the CPAP and i have explained the reason why so important and he feels better since using it. Is not on any meds that are weight promoting. 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 Will start with changing his diet slowly: stop juices, stop fried foods, stop the sweet food and try to eat more " whole foods" . Will go to the Lab for labs, to try to attend the nutrition classes and will likely need individual classes to really learn about nutrition. Info given and follow up in 2 weeks.

## 2020-10-26 NOTE — REVIEW OF SYSTEMS
[MED-ROS-Cardiovas-FT] : sob on walking up the stairs [MED-ROS-Resp-FT] : JESSICA  [MED-ROS-Gastro-FT] : occ constipations [MED-ROS-Genituro-FT] : 0-1 x [MED-ROS-Musclo-FT] : knee pain  [MED-ROS-Psych-FT] : depression , improved since on Wellbutrin , not resolved

## 2020-10-26 NOTE — REASON FOR VISIT
[Initial Consultation] : an initial consultation for [Obesity] : obesity [Obstructive Sleep Apena] : obstructive sleep apena

## 2020-10-26 NOTE — PHYSICAL EXAM
[Obese] : obese [Normal] : grossly intact [de-identified] : not examined due to Covid period  [de-identified] : thick  [de-identified] : obese, scars from Lap surgery  [de-identified] : trace edema peripherally  [de-identified] : slight depressed affect

## 2020-11-09 ENCOUNTER — APPOINTMENT (OUTPATIENT)
Dept: DISASTER EMERGENCY | Facility: CLINIC | Age: 55
End: 2020-11-09

## 2020-11-10 LAB — SARS-COV-2 N GENE NPH QL NAA+PROBE: NOT DETECTED

## 2020-11-11 ENCOUNTER — APPOINTMENT (OUTPATIENT)
Dept: PULMONOLOGY | Facility: CLINIC | Age: 55
End: 2020-11-11

## 2020-11-11 RX ORDER — TADALAFIL 10 MG/1
10 TABLET, FILM COATED ORAL
Qty: 30 | Refills: 0 | Status: ACTIVE | COMMUNITY
Start: 2020-10-26

## 2020-11-11 RX ORDER — ALLOPURINOL 100 MG/1
100 TABLET ORAL
Refills: 0 | Status: DISCONTINUED | COMMUNITY
End: 2020-11-11

## 2020-11-13 ENCOUNTER — TRANSCRIPTION ENCOUNTER (OUTPATIENT)
Age: 55
End: 2020-11-13

## 2020-11-18 ENCOUNTER — APPOINTMENT (OUTPATIENT)
Dept: BARIATRICS/WEIGHT MGMT | Facility: CLINIC | Age: 55
End: 2020-11-18
Payer: MEDICARE

## 2020-11-18 VITALS
TEMPERATURE: 97.3 F | DIASTOLIC BLOOD PRESSURE: 135 MMHG | HEART RATE: 91 BPM | BODY MASS INDEX: 44.1 KG/M2 | WEIGHT: 315 LBS | SYSTOLIC BLOOD PRESSURE: 196 MMHG | HEIGHT: 71 IN | OXYGEN SATURATION: 97 %

## 2020-11-18 VITALS — SYSTOLIC BLOOD PRESSURE: 138 MMHG | DIASTOLIC BLOOD PRESSURE: 93 MMHG

## 2020-11-18 PROCEDURE — 99214 OFFICE O/P EST MOD 30 MIN: CPT

## 2020-11-18 NOTE — HISTORY OF PRESENT ILLNESS
[FreeTextEntry1] : Here for follow up. Down 2 lbs has  cut out soda, juice , tried to cut down on the carbohydrates. Has not increased his exercise at all. Still using his cpap daily again. \par Bed: 11:30 -12am, awoke at 6: 30 \par Breakfast( 7am ) : coffee, water, OJ, 1/2 piece chicken , waffles\par Lunch: catfish and shrimp ,( fried) \par Dinner: (7 pm ) catfish shrimp( fried)

## 2020-11-18 NOTE — ASSESSMENT
[FreeTextEntry1] : 1. Obesity: \par a. go to the lab today and have labs drawn\par b. try to attend the classes to increase his nutritional knowledge\par c. cut out most to all of the deep frying and try new methods and air frier, to try to decrease the pre- prepared foods- eg waffles, cakes cookies. To try new foods and methods of eating and cooking \par d. In terms of the Holidays to try to stay the same wt, modify his eating. \par e follow up in 2-3 weeks.

## 2020-11-23 LAB
25(OH)D3 SERPL-MCNC: 19.6 NG/ML
BASOPHILS # BLD AUTO: 0.03 K/UL
BASOPHILS NFR BLD AUTO: 0.6 %
CA-I SERPL-SCNC: 1.24 MMOL/L
EOSINOPHIL # BLD AUTO: 0.19 K/UL
EOSINOPHIL NFR BLD AUTO: 3.5 %
ESTIMATED AVERAGE GLUCOSE: 137 MG/DL
FERRITIN SERPL-MCNC: 152 NG/ML
HBA1C MFR BLD HPLC: 6.4 %
HCT VFR BLD CALC: 49.3 %
HGB BLD-MCNC: 14.5 G/DL
IMM GRANULOCYTES NFR BLD AUTO: 0.4 %
IRON SATN MFR SERPL: 28 %
IRON SERPL-MCNC: 91 UG/DL
LYMPHOCYTES # BLD AUTO: 1.6 K/UL
LYMPHOCYTES NFR BLD AUTO: 29.4 %
MAN DIFF?: NORMAL
MCHC RBC-ENTMCNC: 27.5 PG
MCHC RBC-ENTMCNC: 29.4 GM/DL
MCV RBC AUTO: 93.5 FL
MONOCYTES # BLD AUTO: 0.68 K/UL
MONOCYTES NFR BLD AUTO: 12.5 %
NEUTROPHILS # BLD AUTO: 2.92 K/UL
NEUTROPHILS NFR BLD AUTO: 53.6 %
PHOSPHATE SERPL-MCNC: 3.4 MG/DL
PLATELET # BLD AUTO: 237 K/UL
PTH RELATED PROT SERPL-MCNC: <2 PMOL/L
RBC # BLD: 5.27 M/UL
RBC # FLD: 15.9 %
SELENIUM SERPL-MCNC: 134 UG/L
TIBC SERPL-MCNC: 325 UG/DL
UIBC SERPL-MCNC: 234 UG/DL
VIT B1 SERPL-MCNC: 120.6 NMOL/L
VIT B12 SERPL-MCNC: 686 PG/ML
WBC # FLD AUTO: 5.44 K/UL

## 2020-11-24 LAB — ZINC SERPL-MCNC: 90 UG/DL

## 2020-11-25 LAB — COPPER SERPL-MCNC: 136 UG/DL

## 2020-11-27 LAB
METHYLMALONATE SERPL-SCNC: 181 NMOL/L
VIT A SERPL-MCNC: 60.2 UG/DL

## 2020-12-01 ENCOUNTER — APPOINTMENT (OUTPATIENT)
Dept: PULMONOLOGY | Facility: CLINIC | Age: 55
End: 2020-12-01
Payer: MEDICARE

## 2020-12-01 VITALS — BODY MASS INDEX: 45.75 KG/M2 | SYSTOLIC BLOOD PRESSURE: 130 MMHG | DIASTOLIC BLOOD PRESSURE: 80 MMHG | WEIGHT: 315 LBS

## 2020-12-01 VITALS — OXYGEN SATURATION: 96 % | HEART RATE: 70 BPM | RESPIRATION RATE: 16 BRPM

## 2020-12-01 PROCEDURE — 99072 ADDL SUPL MATRL&STAF TM PHE: CPT

## 2020-12-01 PROCEDURE — 99214 OFFICE O/P EST MOD 30 MIN: CPT

## 2020-12-01 NOTE — HISTORY OF PRESENT ILLNESS
[Follow-Up] : follow-up of [Currently Experiencing] : The patient is currently experiencing symptoms. [Inhaled Albuterol] : inhaled albuterol [None] : The patient is currently asymptomatic [Good Compliance] : good compliance with treatment [Good Tolerance] : good tolerance of treatment [Good Symptom Control] : good symptom control [Follow-Up - Routine Clinic] : a routine clinic follow-up of [Excess Weight] : excess weight [Inability to Lose Weight] : inability to lose weight [Dyspnea] : dyspnea [Back Pain] : back pain [Low Calorie Diet] : low calorie diet [Exercise Regimen] : exercise regimen [Fair Compliance] : fair compliance with treatment [Fair Tolerance] : fair tolerance of treatment [Fair Symptom Control] : fair symptom control [Sleep Apnea] : sleep apnea [High] : high [Low Calorie] : low calorie [FreeTextEntry1] : Patient reports occasional wheeze and SOB especially with exertion. He reports improvement with Advair which he is now only using as needed rather than BID as was prescribed.\par He was recently seen in [facility] for SOB but w/u was negative as per the patient and he was d/c'd without therapy. He now is back to baseline.\par SOB may be related to morbid obesity.\par He is s/p gastric sleeve surgery in 2016 with initial 60 lbs weight loss but gained 40 lbs back now. \par Pt was last seen in 2016. [de-identified] : intermittent wheeze, SOBOE, and intermittent chest tightness [Morning Headaches] : no morning headaches [Shortness of Breath] : no shortness of breath [de-identified] : AutoCPAP [Diabetes] : no diabetes [Hypertension] : no hypertension

## 2020-12-01 NOTE — DISCUSSION/SUMMARY
[FreeTextEntry1] : \par #1. Diet and exercise for weight loss\par #2. Consider Advair but for now continue as needed ProAir for wheeze and exertional SOB\par #3. Exertional SOB and restrictive pattern on spirometry are likely weight related \par #4. Prior CXR from 2018 was clear by report\par #5. Continue AutoCPAP 7-16 cm of water for moderate JESSICA; improved from previous with weight loss\par #6.  F/u in one month for reevaluation with compliance and PFTs and CXR\par #7. Consider eventual MCT to r/o asthma if intermittent SOB and wheeze persist\par #8. The reduction in spirometry seen previously may be related to heat and humidity. I will continue to monitor periodically.\par #9. Consider bariatric surgery re-evaluation given weight gain; also recommended nutritionist eval\par #10. Replace equipment as needed; ordered 9/8/20\par #11. Reviewed risks of exposure and symptoms of Covid-19 virus, including how the virus is spread and precautions to avoid anita virus.\par \par Patient's questions were answered and patient appears to understand these recommendations

## 2020-12-01 NOTE — CONSULT LETTER
[Dear  ___] : Dear  [unfilled], [Consult Letter:] : I had the pleasure of evaluating your patient, [unfilled]. [Please see my note below.] : Please see my note below. [Consult Closing:] : Thank you very much for allowing me to participate in the care of this patient.  If you have any questions, please do not hesitate to contact me. [Sincerely,] : Sincerely, [DrMary  ___] : Dr. MONROE [Maciel Shah MD] : Maciel Shha MD [FreeTextEntry3] : Maciel Shah MD, FCCP, D. ABSM\par Pulmonary and Sleep Medicine\par [facility] [place]

## 2020-12-07 ENCOUNTER — APPOINTMENT (OUTPATIENT)
Dept: BARIATRICS/WEIGHT MGMT | Facility: CLINIC | Age: 55
End: 2020-12-07
Payer: MEDICARE

## 2020-12-07 VITALS
OXYGEN SATURATION: 95 % | HEIGHT: 71 IN | BODY MASS INDEX: 44.1 KG/M2 | HEART RATE: 91 BPM | WEIGHT: 315 LBS | TEMPERATURE: 97.6 F | DIASTOLIC BLOOD PRESSURE: 89 MMHG | SYSTOLIC BLOOD PRESSURE: 149 MMHG

## 2020-12-07 LAB
ALBUMIN SERPL ELPH-MCNC: 4.6 G/DL
ALP BLD-CCNC: 48 U/L
ALT SERPL-CCNC: 23 U/L
ANION GAP SERPL CALC-SCNC: 12 MMOL/L
AST SERPL-CCNC: 26 U/L
BILIRUB SERPL-MCNC: 0.4 MG/DL
BUN SERPL-MCNC: 18 MG/DL
CALCIUM SERPL-MCNC: 9.7 MG/DL
CHLORIDE SERPL-SCNC: 102 MMOL/L
CO2 SERPL-SCNC: 30 MMOL/L
CREAT SERPL-MCNC: 1.39 MG/DL
GLUCOSE SERPL-MCNC: 100 MG/DL
POTASSIUM SERPL-SCNC: 3.9 MMOL/L
PROT SERPL-MCNC: 7.5 G/DL
SODIUM SERPL-SCNC: 144 MMOL/L

## 2020-12-07 PROCEDURE — 99072 ADDL SUPL MATRL&STAF TM PHE: CPT

## 2020-12-07 PROCEDURE — 99214 OFFICE O/P EST MOD 30 MIN: CPT

## 2020-12-07 NOTE — ASSESSMENT
[FreeTextEntry1] : 1. Obesity with basically early DM, the patient is interested in starting on Rybelsus, not yet ready to start with injections but would consider in the future, To start on it, get labs , go for nutrition classes \par M, tue , wed he is off and that would be the best time to speak to the nutritionist. \par Described sugars and simple carbohydrates, , plant based diet, or vegetarian .\par to increase his exercise if possible \par follow up in 4 weeks. \par Follow up with PCP.

## 2020-12-07 NOTE — HISTORY OF PRESENT ILLNESS
[FreeTextEntry1] : Here for follow up. His wt is up 5 lbs but states he is not eating more. Of course he works nights. Has been working overnights but plans on working 3-11pm  if approved by his employer. \par Breakfast : egg whites, turkey, wheat toast, water \par  goes to bed by 9 am \par sleep till 2 pm \par eats at work( does a double shift on the weekends) \par meal: crab cakes, mushrooms, water, or chicken or turkey , or fish \par meal at about 4 am : bagel with cream cheese. \par exercise: tues and Thurs.  doing some exercise.\par Seen by Pulm for follow up and states he is using his cpap machine.

## 2020-12-15 ENCOUNTER — APPOINTMENT (OUTPATIENT)
Dept: CARDIOLOGY | Facility: CLINIC | Age: 55
End: 2020-12-15
Payer: MEDICARE

## 2020-12-15 ENCOUNTER — NON-APPOINTMENT (OUTPATIENT)
Age: 55
End: 2020-12-15

## 2020-12-15 VITALS
DIASTOLIC BLOOD PRESSURE: 88 MMHG | TEMPERATURE: 95.4 F | HEIGHT: 71 IN | BODY MASS INDEX: 44.1 KG/M2 | SYSTOLIC BLOOD PRESSURE: 134 MMHG | HEART RATE: 67 BPM | RESPIRATION RATE: 16 BRPM | WEIGHT: 315 LBS

## 2020-12-15 DIAGNOSIS — R10.9 UNSPECIFIED ABDOMINAL PAIN: ICD-10-CM

## 2020-12-15 PROCEDURE — 99072 ADDL SUPL MATRL&STAF TM PHE: CPT

## 2020-12-15 PROCEDURE — 93000 ELECTROCARDIOGRAM COMPLETE: CPT

## 2020-12-15 PROCEDURE — 99214 OFFICE O/P EST MOD 30 MIN: CPT

## 2020-12-15 NOTE — REVIEW OF SYSTEMS
[Blurry Vision] : blurred vision [Recent Weight Gain (___ Lbs)] : recent [unfilled] ~Ulb weight gain [FreeTextEntry1] : Other than as documented here and in the HPI, the thirteen point ROS is negative

## 2020-12-15 NOTE — PHYSICAL EXAM
[Normal Conjunctiva] : the conjunctiva exhibited no abnormalities [Eyelids - No Xanthelasma] : the eyelids demonstrated no xanthelasmas [Normal Oral Mucosa] : normal oral mucosa [No Oral Pallor] : no oral pallor [No Oral Cyanosis] : no oral cyanosis [Normal Jugular Venous A Waves Present] : normal jugular venous A waves present [Normal Jugular Venous V Waves Present] : normal jugular venous V waves present [No Jugular Venous Fernández A Waves] : no jugular venous fernández A waves [Abnormal Walk] : normal gait [Gait - Sufficient For Exercise Testing] : the gait was sufficient for exercise testing [Nail Clubbing] : no clubbing of the fingernails [Cyanosis, Localized] : no localized cyanosis [Petechial Hemorrhages (___cm)] : no petechial hemorrhages [Skin Color & Pigmentation] : normal skin color and pigmentation [] : no rash [No Venous Stasis] : no venous stasis [Skin Lesions] : no skin lesions [No Skin Ulcers] : no skin ulcer [No Xanthoma] : no  xanthoma was observed [Oriented To Time, Place, And Person] : oriented to person, place, and time [Affect] : the affect was normal [Mood] : the mood was normal [No Anxiety] : not feeling anxious [FreeTextEntry1] : Obese, soft, nontender.

## 2020-12-15 NOTE — REASON FOR VISIT
[FreeTextEntry1] : This is a 55  year old male patient presenting for cardiac re- evaluation and chest pain\par \par For the last week he has been experiencing left sided chest pain which happens once daily and can last up to 45 minutes. Discomfort improves with Tylenol\par \par There is also FANG especially when walking up a flight of stairs or bringing the garbage to the street, this is not new.\par Using CPAP for the most part every night .\par \par Recently saw Dr. Shah for SOB and noted improvement in is FANG with Advair and had a negative w.u for SOB at [facility].\par \par HE is s/p gastric sleeve in 2016 and his weight has fluctuated, max weight loss 60 lbs. \par \par Hospitalized June 2018 for a chest pain syndrome.\par Cardiac catheterization demonstrated absence of any significant coronary artery disease.\par The cardiac filling pressures were, however, quite elevated with moderately severe pulmonary artery hypertension as well as a high pulmonary capillary wedge pressure.\par \par In hospital  echocardiogram revealed a decreased EF 40-45% with mildly decreased left ventricular systolic function and mild left ventricular hypertrophy.\par \par The patient was subsequently started on beta blockers, ACE inhibitors and diuretics.\par \par Admits to considerable weight gain while sheltering at home during the pandemic\par \par He does not exercise, being limited by some orthopedic issues as recently it is his ankle.\par

## 2020-12-15 NOTE — ASSESSMENT
[FreeTextEntry1] : ECG:  Sinus  Rhythm at 67\par -Poor R-wave progression -nonspecific -consider old anterior infarct. \par -Nonspecific intraventricular conduction delay\par \par Lab data 12/7/20\par Creat. 1.39\par \par 11/23/20\par HGB 14.5\par A1C 6.4\par \par \par Lab data 6/14/19 ( No Statin)\par Chol. 299\par LDL  196\par HDL    47\par Tri.    191\par \par Creat 1.34\par \par Echo 2/17/20\par LVH with mildly reduced LVEF 45%\par Diast Filling abn\par LAE\par PAP = 39 mm Hg\par Asc aorta 4 cm\par \par Impression:\par 1. Nonischemic cardiomyopathy. Etiology unclear possibly multifactorial. The role of chronic morbid obesity, prior untreated sleep apnea, long-standing hypertension, all need to be considered. This was explained to the patient in some detail. \par \par 2. Sleep apnea treatment seems to be inconsistent.  More recently has been using it and compliance is improved\par \par 3. History of morbid obesity, prior bariatric surgery and weight is up 6 lbs this month already. \par     Is following up regularly with Dr Marybeth Wright, who has been providing nutritional counseling\par \par 4. No evidence of sig. coronary artery disease on the basis of angiography one year ago.\par \par 5. Severe pulmonary artery hypertension by cath, milder on echo, possibly related to sleep apnea. \par     Non ischemic cardiomyopathy   LVEF 45%. No no signs of HF on exam today. \par \par 6.  Today BP controlled. \par \par 7. Glycemic index elevated, no recent lipid panel.\par \par 8. Chest pain which is  reproducible and unlikely cardiac in nature as his last cath. showed no obstructive disease.  FANG which is not new and multifactorial in regards to his weight, JESSICA,  PAH and cardiomyopathy, \par \par Plan\par 1. Continue all meds as prescribed. \par 2. Repeat echo to reassess the above mentioned PAH and CM prior to f/u. \par 3. CMP and lipid due in Feb. \par 4. Weight loss strongly emphasized.  Discussed in detail\par  \par \par Clinical follow up in 3 months \par

## 2020-12-15 NOTE — HISTORY OF PRESENT ILLNESS
[FreeTextEntry1] : His medical/cardiac history includes:\par \par 1. Pericarditis in 2016\par 2. Long standing morbid obesity\par 3. Nonischemic cardiomyopathy\par 4. CAD\par 5. Hyperlipemia\par 6. Bariatric surgery\par \par In 2016 as advised he completed a sleep study which showed moderate sleep apnea with an overall  AHI of 24.7 events/HR\par \par \par \par \par \par

## 2020-12-23 ENCOUNTER — APPOINTMENT (OUTPATIENT)
Dept: PULMONOLOGY | Facility: CLINIC | Age: 55
End: 2020-12-23
Payer: MEDICARE

## 2020-12-23 PROCEDURE — 99214 OFFICE O/P EST MOD 30 MIN: CPT | Mod: 95

## 2020-12-23 NOTE — PHYSICAL EXAM
[Normal Appearance] : normal appearance [Normal Conjunctiva] : the conjunctiva exhibited no abnormalities [Neck Appearance] : the appearance of the neck was normal [] : no respiratory distress [Respiration, Rhythm And Depth] : normal respiratory rhythm and effort [Exaggerated Use Of Accessory Muscles For Inspiration] : no accessory muscle use [Oriented To Time, Place, And Person] : oriented to person, place, and time [FreeTextEntry1] : Morbidly obese

## 2020-12-23 NOTE — DISCUSSION/SUMMARY
[FreeTextEntry1] : \par #1. Diet and exercise for weight loss\par #2. Consider Advair but for now continue as needed ProAir for wheeze and exertional SOB\par #3. Exertional SOB and restrictive pattern on spirometry are likely weight related \par #4. Prior CXR from 2018 was clear by report and repeat with mildly elevated R hemidiaphragm with ? RLL infiltrate vs atelectasis; pt is asymptomatic therefore will observe off of therapy and repeat in one month\par #5. Continue AutoCPAP 7-16 cm of water for moderate JESSICA; improved from previous with weight loss; encouraged improved compliance\par #6.  F/u in one month for reevaluation with repeat compliance and PFTs and repeat CXR\par #7. Consider eventual MCT to r/o asthma if intermittent SOB and wheeze persist\par #8. Follow lung function periodically\par #9. Consider bariatric surgery re-evaluation given weight gain; also recommended nutritionist eval\par #10. Replace equipment as needed; ordered 9/8/20; needs new mask as prior mask is ripped\par #11. Referred patient for evaluation by obesity medicine.\par #12. Reviewed risks of exposure and symptoms of Covid-19 virus, including how the virus is spread and precautions to avoid anita virus.\par \par Patient's questions were answered and patient appears to understand these recommendations

## 2020-12-23 NOTE — HISTORY OF PRESENT ILLNESS
[Home] : at home, [unfilled] , at the time of the visit. [Medical Office: ([facility])___] : at the medical office located in  [Verbal consent obtained from patient] : the patient, [unfilled] [Follow-Up] : follow-up of [Currently Experiencing] : The patient is currently experiencing symptoms. [Inhaled Albuterol] : inhaled albuterol [Good Compliance] : good compliance with treatment [Good Tolerance] : good tolerance of treatment [Good Symptom Control] : good symptom control [Follow-Up - Routine Clinic] : a routine clinic follow-up of [Excess Weight] : excess weight [Inability to Lose Weight] : inability to lose weight [Dyspnea] : dyspnea [Back Pain] : back pain [Low Calorie Diet] : low calorie diet [Exercise Regimen] : exercise regimen [Fair Compliance] : fair compliance with treatment [Fair Tolerance] : fair tolerance of treatment [Fair Symptom Control] : fair symptom control [Sleep Apnea] : sleep apnea [High] : high [Low Calorie] : low calorie [On ___] : performed on [unfilled] [Patient] : the patient [To Assess ___] : to assess [unfilled] [None] : no new symptoms reported [FreeTextEntry1] : Patient reports occasional wheeze and SOB especially with exertion. He reports improvement with Advair which he is now only using as needed rather than BID as was prescribed.\par He was recently seen in [facility] for SOB but w/u was negative as per the patient and he was d/c'd without therapy. He now is back to baseline.\par SOB may be related to morbid obesity.\par He is s/p gastric sleeve surgery in 2016 with initial 60 lbs weight loss but gained 40 lbs back now. \par Pt was last seen in 2016 until this year.\par Pt with CXR with ? RLL infiltrate vs atelectasis but is asymptomatic therefore will hold off on therapy for now but will repeat CXR in one month [de-identified] : intermittent wheeze, SOBOE, and intermittent chest tightness [Morning Headaches] : no morning headaches [Shortness of Breath] : no shortness of breath [de-identified] : AutoCPAP [Diabetes] : no diabetes [Hypertension] : no hypertension [FreeTextEntry9] : CXR [FreeTextEntry8] : Mildly elevated R hemidiaphragm with ? infiltrate or compressive atelectasis

## 2020-12-23 NOTE — CONSULT LETTER
[Dear  ___] : Dear  [unfilled], [Consult Letter:] : I had the pleasure of evaluating your patient, [unfilled]. [Please see my note below.] : Please see my note below. [Consult Closing:] : Thank you very much for allowing me to participate in the care of this patient.  If you have any questions, please do not hesitate to contact me. [Sincerely,] : Sincerely, [DrMary  ___] : Dr. MONROE [Maciel Shah MD] : Maciel Shah MD [FreeTextEntry3] : Maciel Shah MD, FCCP, D. ABSM\par Pulmonary and Sleep Medicine\par [facility] [place]

## 2021-01-08 ENCOUNTER — APPOINTMENT (OUTPATIENT)
Dept: DISASTER EMERGENCY | Facility: CLINIC | Age: 56
End: 2021-01-08

## 2021-01-11 ENCOUNTER — APPOINTMENT (OUTPATIENT)
Dept: BARIATRICS/WEIGHT MGMT | Facility: CLINIC | Age: 56
End: 2021-01-11
Payer: MEDICARE

## 2021-01-11 VITALS
HEART RATE: 74 BPM | OXYGEN SATURATION: 96 % | TEMPERATURE: 97.8 F | WEIGHT: 315 LBS | DIASTOLIC BLOOD PRESSURE: 88 MMHG | SYSTOLIC BLOOD PRESSURE: 146 MMHG | HEIGHT: 71 IN | BODY MASS INDEX: 44.1 KG/M2

## 2021-01-11 LAB — SARS-COV-2 N GENE NPH QL NAA+PROBE: NOT DETECTED

## 2021-01-11 PROCEDURE — 99214 OFFICE O/P EST MOD 30 MIN: CPT

## 2021-01-11 PROCEDURE — 99072 ADDL SUPL MATRL&STAF TM PHE: CPT

## 2021-01-11 NOTE — ASSESSMENT
[FreeTextEntry1] : 1. Obesity:  Doing well, has clearly changed his diet, admits to having more work to do. Will increase  his Ozempic to 0.5 mg weekly. Is doing some cardio, to be cleared by cardiol and pulm first. Walk as much as possible , given the cold weather . Will do repeat labs  prior to next appointment. \par

## 2021-01-11 NOTE — HISTORY OF PRESENT ILLNESS
[FreeTextEntry1] : Here for follow up , still working exclusively nights \par MTW : off\par Thurs : 10 p  to 6 a \par Fri: 11 p to 7a and 3 pto 7a\par Sun 11 p to 7a \par on the days he is off sleeping at night with a nap if needed \par using the cpap machine as much as possible \par down about 8 lbs on the Ozempic , no Sx on 0.25 mg as of now \par Breakfast : tea: , eggs 2 boiled or scrambled, turkey wall , toast( wheat) \par sleep\par Lunch: baked chicken, salad( crutons, vinegar) \par Takes dinner to work: vegetables: turkey chicken or fish ,  salad, \par snack : fruit: apple , grapes , strawberries\par \par

## 2021-01-13 ENCOUNTER — APPOINTMENT (OUTPATIENT)
Dept: PULMONOLOGY | Facility: CLINIC | Age: 56
End: 2021-01-13
Payer: MEDICARE

## 2021-01-13 VITALS
DIASTOLIC BLOOD PRESSURE: 88 MMHG | HEART RATE: 82 BPM | OXYGEN SATURATION: 97 % | SYSTOLIC BLOOD PRESSURE: 144 MMHG | RESPIRATION RATE: 16 BRPM

## 2021-01-13 PROCEDURE — 99072 ADDL SUPL MATRL&STAF TM PHE: CPT

## 2021-01-13 PROCEDURE — 99214 OFFICE O/P EST MOD 30 MIN: CPT | Mod: 25

## 2021-01-13 PROCEDURE — 94010 BREATHING CAPACITY TEST: CPT

## 2021-01-13 PROCEDURE — 85018 HEMOGLOBIN: CPT | Mod: QW

## 2021-01-13 PROCEDURE — 94729 DIFFUSING CAPACITY: CPT

## 2021-01-13 PROCEDURE — 94727 GAS DIL/WSHOT DETER LNG VOL: CPT

## 2021-01-13 NOTE — REASON FOR VISIT
[Follow-Up] : a follow-up visit [Shortness of Breath] : shortness of Breath [Sleep Apnea] : sleep apnea [FreeTextEntry2] : morbid obesity

## 2021-01-13 NOTE — PROCEDURE
[FreeTextEntry1] : PFTs performed previously revealed a mildly reduced FVC and FEV1 without an obstructive pattern. Lung volumes revealed a mildly reduced TLC. Diffusion capacity was normal. Overall, his lung fxn was at baseline and revealed only mild restriction.\par PFTs performed subsequently in 2016 again revealed a restrictive pattern without a significant BD response. Lung volumes were mildly reduced with a normal diffusion capacity. This spirometry today is slightly reduced c/w his previous study.\par PFTs 1/13/21 - Moderate restriction with mildly reduced lung volumes and borderline reduced diffusion capacity which corrected for alveolar volume; overall worse restriction from previous in 2016

## 2021-01-13 NOTE — DISCUSSION/SUMMARY
[FreeTextEntry1] : \par #1. Diet and exercise for weight loss\par #2. Consider Advair but for now continue as needed ProAir for wheeze and exertional SOB\par #3. Exertional SOB and restrictive pattern on spirometry are likely weight related \par #4. Prior CXR from 2018 was clear by report and repeat with mildly elevated R hemidiaphragm with ? RLL infiltrate vs atelectasis; pt is asymptomatic therefore will observe off of therapy and obtain xray fluoroscopy to evaluate possible paralysis of R hemidiaphragm with sniff test\par #5. Continue AutoCPAP 7-16 cm of water for moderate JESSICA; improved from previous with weight loss; encouraged improved compliance\par #6.  F/u in 3 month for reevaluations with repeat compliance and xray fluoroscopy\par #7. Consider eventual MCT to r/o asthma if intermittent SOB and wheeze persist but appears to be improved\par #8. Follow lung function periodically\par #9. Consider bariatric surgery re-evaluation given weight gain; also recommended nutritionist eval\par #10. Replace equipment as needed; ordered 9/8/20; needs new mask as prior mask is ripped\par #11. Referred patient for evaluation by obesity medicine; he will continue f/u\par #12. Reviewed risks of exposure and symptoms of Covid-19 virus, including how the virus is spread and precautions to avoid anita virus.\par \par Patient's questions were answered and patient appears to understand these recommendations

## 2021-01-13 NOTE — HISTORY OF PRESENT ILLNESS
[Follow-Up] : follow-up of [Currently Experiencing] : The patient is currently experiencing symptoms. [Inhaled Albuterol] : inhaled albuterol [Good Compliance] : good compliance with treatment [Good Tolerance] : good tolerance of treatment [Good Symptom Control] : good symptom control [Follow-Up - Routine Clinic] : a routine clinic follow-up of [Excess Weight] : excess weight [Inability to Lose Weight] : inability to lose weight [Dyspnea] : dyspnea [Back Pain] : back pain [Low Calorie Diet] : low calorie diet [Exercise Regimen] : exercise regimen [Fair Compliance] : fair compliance with treatment [Fair Tolerance] : fair tolerance of treatment [Fair Symptom Control] : fair symptom control [Sleep Apnea] : sleep apnea [High] : high [Low Calorie] : low calorie [On ___] : performed on [unfilled] [Patient] : the patient [To Assess ___] : to assess [unfilled] [None] : no new symptoms reported [FreeTextEntry1] : Patient reports occasional wheeze and SOB especially with exertion. He reports improvement with Advair which he is now only using as needed rather than BID as was prescribed.\par He was recently seen in [facility] for SOB but w/u was negative as per the patient and he was d/c'd without therapy. He now is back to baseline.\par SOB may be related to morbid obesity.\par He is s/p gastric sleeve surgery in 2016 with initial 60 lbs weight loss but gained 40 lbs back now. \par Pt was lost to f/u for 3 years\par Pt with CXR with ? RLL infiltrate vs atelectasis with elevated R hemidiaphragm [de-identified] : intermittent wheeze, SOBOE, and intermittent chest tightness [Morning Headaches] : no morning headaches [Shortness of Breath] : no shortness of breath [de-identified] : AutoCPAP [Diabetes] : no diabetes [Hypertension] : no hypertension [FreeTextEntry9] : CXR [FreeTextEntry8] : Mildly elevated R hemidiaphragm with ? infiltrate or compressive atelectasis

## 2021-01-13 NOTE — CONSULT LETTER
[Dear  ___] : Dear  [unfilled], [Consult Letter:] : I had the pleasure of evaluating your patient, [unfilled]. [Please see my note below.] : Please see my note below. [Consult Closing:] : Thank you very much for allowing me to participate in the care of this patient.  If you have any questions, please do not hesitate to contact me. [Sincerely,] : Sincerely, [DrMary  ___] : Dr. MONROE [Maciel Shah MD] : Maciel Shah MD [FreeTextEntry3] : Maciel Shah MD, FCCP, D. ABSM\par Pulmonary and Sleep Medicine\par [facility] at [place]

## 2021-01-13 NOTE — PHYSICAL EXAM
[No Acute Distress] : no acute distress [Well Nourished] : well nourished [Well Developed] : well developed [Normal Appearance] : normal appearance [Normal Rate/Rhythm] : normal rate/rhythm [Normal S1, S2] : normal s1, s2 [No Murmurs] : no murmurs [No Resp Distress] : no resp distress [No Acc Muscle Use] : no acc muscle use [Normal Rhythm and Effort] : normal rhythm and effort [Clear to Auscultation Bilaterally] : clear to auscultation bilaterally [No Abnormalities] : no abnormalities [Benign] : benign [Not Tender] : not tender [Soft] : soft [Normal Gait] : normal gait [No Clubbing] : no clubbing [No Edema] : no edema [No Focal Deficits] : no focal deficits [Oriented x3] : oriented x3 [TextBox_2] : Morbidly obese

## 2021-01-21 ENCOUNTER — RESULT REVIEW (OUTPATIENT)
Age: 56
End: 2021-01-21

## 2021-01-21 ENCOUNTER — APPOINTMENT (OUTPATIENT)
Dept: INTERVENTIONAL RADIOLOGY/VASCULAR | Facility: CLINIC | Age: 56
End: 2021-01-21
Payer: MEDICARE

## 2021-01-21 ENCOUNTER — OUTPATIENT (OUTPATIENT)
Dept: OUTPATIENT SERVICES | Facility: HOSPITAL | Age: 56
LOS: 1 days | End: 2021-01-21

## 2021-01-21 DIAGNOSIS — J98.6 DISORDERS OF DIAPHRAGM: ICD-10-CM

## 2021-01-21 PROCEDURE — 76000 FLUOROSCOPY <1 HR PHYS/QHP: CPT | Mod: 26

## 2021-02-22 ENCOUNTER — APPOINTMENT (OUTPATIENT)
Dept: BARIATRICS/WEIGHT MGMT | Facility: CLINIC | Age: 56
End: 2021-02-22

## 2021-03-08 ENCOUNTER — APPOINTMENT (OUTPATIENT)
Dept: CARDIOLOGY | Facility: CLINIC | Age: 56
End: 2021-03-08
Payer: MEDICARE

## 2021-03-08 PROCEDURE — 99072 ADDL SUPL MATRL&STAF TM PHE: CPT

## 2021-03-08 PROCEDURE — 93306 TTE W/DOPPLER COMPLETE: CPT

## 2021-03-08 RX ADMIN — PERFLUTREN MG/ML: 6.52 INJECTION, SUSPENSION INTRAVENOUS at 00:00

## 2021-03-09 RX ORDER — PERFLUTREN 6.52 MG/ML
6.52 INJECTION, SUSPENSION INTRAVENOUS
Qty: 1 | Refills: 0 | Status: COMPLETED | OUTPATIENT
Start: 2021-03-08

## 2021-03-23 ENCOUNTER — APPOINTMENT (OUTPATIENT)
Dept: CARDIOLOGY | Facility: CLINIC | Age: 56
End: 2021-03-23
Payer: MEDICARE

## 2021-03-23 VITALS
DIASTOLIC BLOOD PRESSURE: 82 MMHG | WEIGHT: 315 LBS | HEART RATE: 74 BPM | RESPIRATION RATE: 16 BRPM | SYSTOLIC BLOOD PRESSURE: 138 MMHG | HEIGHT: 71 IN | OXYGEN SATURATION: 96 % | BODY MASS INDEX: 44.1 KG/M2

## 2021-03-23 PROCEDURE — 99072 ADDL SUPL MATRL&STAF TM PHE: CPT

## 2021-03-23 PROCEDURE — 99214 OFFICE O/P EST MOD 30 MIN: CPT

## 2021-03-23 PROCEDURE — 93000 ELECTROCARDIOGRAM COMPLETE: CPT

## 2021-03-23 NOTE — HISTORY OF PRESENT ILLNESS
[FreeTextEntry1] : His medical/cardiac history includes:\par \par 1. Pericarditis in 2016\par 2. Long standing morbid obesity\par 3. Nonischemic cardiomyopathy\par 4. CAD\par 5. Hyperlipemia\par 6. Bariatric surgery\par 7.  Pulmonary artery hypertension\par 8.  Obstructive sleep apnea\par \par In 2016 as advised he completed a sleep study which showed moderate sleep apnea with an overall  AHI of 24.7 events/HR\par \par \par \par \par \par

## 2021-03-23 NOTE — ASSESSMENT
[FreeTextEntry1] : ECG:  Sinus  Rhythm at 74 BPM \par -Poor R-wave progression -nonspecific -consider old anterior infarct. \par -Nonspecific intraventricular conduction delay\par \par Lab data 12/7/20\par Creat. 1.39\par \par 11/23/20\par HGB 14.5\par A1C 6.4\par \par \par Lab data 6/14/19 ( No Statin)\par Chol. 299\par LDL  196\par HDL    47\par Tri.    191\par \par Creat 1.34\par \par Echocardiogram 3/8/2021:\par Left ventricular dilatation, with LVH and mildly reduced global systolic function.  Ejection fraction about 45%\par Left atrial enlargement\par Mild pulmonary hypertension 41 mm Hg\par Mild dilatation of the ascending aorta 4 cm\par \par Echo 2/17/20\par LVH with mildly reduced LVEF 45%\par Diast Filling abn\par LAE\par PAP = 39 mm Hg\par Asc aorta 4 cm\par \par Impression:\par 1. Nonischemic cardiomyopathy with stable mild global LV systolic dysfunction.. Etiology unclear possibly multifactorial. The role of chronic morbid obesity, prior untreated sleep apnea, long-standing hypertension, all need to be considered. This was explained to the patient in some detail. \par \par 2. Sleep apnea treatment seems to be inconsistent.  Most recent report 1/11/2021 shows that he is compliant at about 50% for more than 4 hours a night \par \par \par 3. History of morbid obesity, prior bariatric surgery and weight is by his report down about 10 pounds from last visit.  He  is following up regularly with Dr Marybeth Wright, who has been providing nutritional counseling\par \par 4. No evidence of sig. coronary artery disease on the basis of angiography 2018\par \par 5. Severe pulmonary artery hypertension by cath, milder on echo, possibly related to sleep apnea. \par     Non ischemic cardiomyopathy   LVEF 45%. No no signs of HF on exam today. \par \par 6.  Blood pressure borderline elevated\par \par 7. Glycemic index elevated, no recent lipid panel.\par \par 8. Chest pain which is  reproducible and unlikely cardiac in nature as his last cath. showed no obstructive disease.  FANG which is not new and multifactorial in regards to his weight, JESSICA,  PAH and cardiomyopathy, \par \par Plan\par 1. Continue all meds as prescribed. \par 2.  Instructed the patient about the benefits of a diet that restricts portion sizes, increases frequency of meals and consists of  vegetables, (more green and leafy),fruit and nuts, whole grains, lean proteins and limits carbohydrates and meat and dairy fats\par 3. CMP and lipid due\par 4.  The patient was instructed to follow a symptom limited regimen of moderate aerobic exercise for 30 minutes 3 to 4 days a week. A warm up and cool down period are to be added to the regimen and small incremental changes can be made every few weeks. Any new symptoms of exercise related chest pain or dyspnea should be reported.\par 5.  Compliance with CPAP\par  \par \par Clinical follow up in 4 months \par

## 2021-03-23 NOTE — REASON FOR VISIT
[FreeTextEntry1] : This is a 55  year old male patient presenting for cardiac re- evaluation and chest pain syndrome\par Pain is fairly infrequent, occurring about once a month.  Can typically last 15 to 20 minutes.  Nonexertional.\par \par There is also FANG especially when walking up a flight of stairs or bringing the garbage to the street, this is not new.\par Using CPAP for the  part every night .\par \par Recently saw Dr. Shah for SOB and noted improvement in is FANG with Advair and had a negative w.u for SOB at [facility].\par \par HE is s/p gastric sleeve in 2016 and his weight has fluctuated, max weight loss 60 lbs. \par \par Hospitalized June 2018 for a chest pain syndrome.\par Cardiac catheterization demonstrated absence of any significant coronary artery disease.\par The cardiac filling pressures were, however, quite elevated with moderately severe pulmonary artery hypertension as well as a high pulmonary capillary wedge pressure.\par \par In hospital  echocardiogram revealed a decreased EF 40-45% with mildly decreased left ventricular systolic function and mild left ventricular hypertrophy.\par \par The patient was subsequently started on beta blockers, ACE inhibitors and diuretics.\par \par Admits to considerable weight gain while sheltering at home during the pandemic\par \par He does not exercise, being limited by some orthopedic issues as recently it is his ankle.\par

## 2021-03-23 NOTE — REVIEW OF SYSTEMS
[Blurry Vision] : blurred vision [Recent Weight Gain (___ Lbs)] : no recent weight gain [Recent Weight Loss (___ Lbs)] : recent [unfilled] ~Ulb weight loss [FreeTextEntry1] : Other than as documented here and in the HPI, the thirteen point ROS is negative

## 2021-04-14 ENCOUNTER — APPOINTMENT (OUTPATIENT)
Dept: PULMONOLOGY | Facility: CLINIC | Age: 56
End: 2021-04-14

## 2021-04-28 ENCOUNTER — APPOINTMENT (OUTPATIENT)
Dept: PULMONOLOGY | Facility: CLINIC | Age: 56
End: 2021-04-28
Payer: MEDICARE

## 2021-04-28 VITALS
DIASTOLIC BLOOD PRESSURE: 80 MMHG | SYSTOLIC BLOOD PRESSURE: 132 MMHG | RESPIRATION RATE: 16 BRPM | OXYGEN SATURATION: 97 % | HEART RATE: 86 BPM

## 2021-04-28 PROCEDURE — 99214 OFFICE O/P EST MOD 30 MIN: CPT

## 2021-04-28 PROCEDURE — 99072 ADDL SUPL MATRL&STAF TM PHE: CPT

## 2021-04-28 RX ORDER — ALBUTEROL SULFATE 90 UG/1
108 (90 BASE) INHALANT RESPIRATORY (INHALATION)
Qty: 1 | Refills: 2 | Status: ACTIVE | COMMUNITY
Start: 2020-09-08 | End: 1900-01-01

## 2021-04-28 NOTE — DISCUSSION/SUMMARY
[FreeTextEntry1] : \par #1. Diet and exercise for weight loss\par #2. Could consider Advair but for now will continue as needed ProAir for wheeze and exertional SOB\par #3. Exertional SOB and restrictive pattern on spirometry are likely weight related; follow lung function intermittently\par #4. Prior CXR from 2018 was clear by report and repeat with mildly elevated R hemidiaphragm with ? RLL infiltrate vs atelectasis; pt is asymptomatic therefore will observe off of therapy;  xray fluoroscopy to evaluate possible paralysis of R hemidiaphragm revealed no paradoxical motion but with moderately impaired motion of unclear etiology; could consider neurologic evaluation\par #5. Continue AutoCPAP 7-16 cm of water for moderate JESSICA; improved from previous with weight loss; encouraged improved compliance\par #6.  F/u in 6 weeks month for reevaluation with repeat compliance to assess response to nasal mask\par #7. Consider eventual MCT to r/o asthma if intermittent SOB and wheeze persist but appears to be improved\par #8. Follow lung function periodically\par #9. Consider bariatric surgery re-evaluation given weight gain; also recommended nutritionist eval\par #10. Replace equipment as needed; ordered 9/8/20; needs new mask \par #11. Referred patient for evaluation by obesity medicine; he will continue f/u\par #12. Reviewed risks of exposure and symptoms of Covid-19 virus, including how the virus is spread and precautions to avoid anita virus.\par \par Patient's questions were answered and patient appears to understand these recommendations

## 2021-04-28 NOTE — CONSULT LETTER
[Dear  ___] : Dear  [unfilled], [Consult Letter:] : I had the pleasure of evaluating your patient, [unfilled]. [Please see my note below.] : Please see my note below. [Consult Closing:] : Thank you very much for allowing me to participate in the care of this patient.  If you have any questions, please do not hesitate to contact me. [Sincerely,] : Sincerely, [DrMary  ___] : Dr. MONROE [Maciel Shah MD] : Maciel Shah MD [FreeTextEntry3] : Maciel Shah MD, FCCP, D. ABSM\par Pulmonary and Sleep Medicine\par [facility] at [place]

## 2021-04-28 NOTE — REVIEW OF SYSTEMS
[Dry Eyes] : dryness of the eyes [Cough] : cough [Dyspnea] : dyspnea [Chest Tightness] : chest tightness [Wheezing] : wheezing [Hypertension] : ~T hypertension [Itchy Eyes] : itching of ~T the eyes [Dysuria] : dysuria [Back Pain] : ~T back pain [Depression] : depression [As Noted in HPI] : as noted in HPI [Fever] : no fever [Chills] : no chills [Eye Irritation] : no ~T irritation of the eyes [Nasal Congestion] : no nasal congestion [Epistaxis] : no nosebleeds [Postnasal Drip] : no postnasal drip [Sinus Problems] : no sinus problems [Sputum] : not coughing up ~M sputum [Hemoptysis] : no hemoptysis [Pleuritic Pain] : no pleuritic pain [Chest Discomfort] : no chest discomfort [Dysrhythmia] : no dysrhythmia [Murmurs] : no murmurs were heard [Edema] : ~T edema was not present [Palpitations] : no palpitations [Hay Fever] : no hay fever [Reflux] : no reflux [Nausea] : no nausea [Vomiting] : no vomiting [Constipation] : no constipation [Diarrhea] : no diarrhea [Abdominal Pain] : no abdominal pain [Frequency] : no change in urinary frequency [Trauma] : no ~T physical trauma [Fracture] : no fracture [Anemia] : no anemia [Headache] : no headache [Dizziness] : no dizziness [Syncope] : no fainting [Numbness] : no numbness [Paralysis] : no paralysis was seen [Seizures] : no seizures [Anxiety] : no anxiety [Diabetes] : no diabetes mellitus [Thyroid Problem] : no thyroid problem

## 2021-04-28 NOTE — HISTORY OF PRESENT ILLNESS
[Follow-Up] : follow-up of [Currently Experiencing] : The patient is currently experiencing symptoms. [Inhaled Albuterol] : inhaled albuterol [Good Compliance] : good compliance with treatment [Good Tolerance] : good tolerance of treatment [Follow-Up - Routine Clinic] : a routine clinic follow-up of [Excess Weight] : excess weight [Inability to Lose Weight] : inability to lose weight [Dyspnea] : dyspnea [Back Pain] : back pain [Low Calorie Diet] : low calorie diet [Exercise Regimen] : exercise regimen [Fair Compliance] : fair compliance with treatment [Fair Tolerance] : fair tolerance of treatment [Fair Symptom Control] : fair symptom control [Sleep Apnea] : sleep apnea [High] : high [Low Calorie] : low calorie [On ___] : performed on [unfilled] [Patient] : the patient [To Assess ___] : to assess [unfilled] [None] : no new symptoms reported [Poor Compliance] : poor compliance with treatment [Poor Tolerance] : poor tolerance of treatment [Poor Symptom Control] : poor symptom control [FreeTextEntry1] : Patient reports occasional wheeze and SOB especially with exertion. He reports improvement with Advair which he is now only using as needed rather than BID as was prescribed.\par He was recently seen in [facility] for SOB but w/u was negative as per the patient and he was d/c'd without therapy. He now is back to baseline.\par SOB may be related to morbid obesity.\par He is s/p gastric sleeve surgery in 2016 with initial 60 lbs weight loss but gained 40 lbs back now. \par Pt was lost to f/u for 3 years\par Pt with CXR with ? RLL infiltrate vs atelectasis with elevated R hemidiaphragm [de-identified] : intermittent wheeze, SOBOE, and intermittent chest tightness [Morning Headaches] : no morning headaches [Shortness of Breath] : no shortness of breath [de-identified] : AutoCPAP [Diabetes] : no diabetes [Hypertension] : no hypertension [FreeTextEntry9] : CXR [FreeTextEntry8] : Mildly elevated R hemidiaphragm with ? infiltrate or compressive atelectasis

## 2021-06-09 ENCOUNTER — APPOINTMENT (OUTPATIENT)
Dept: PULMONOLOGY | Facility: CLINIC | Age: 56
End: 2021-06-09
Payer: MEDICARE

## 2021-06-09 VITALS
TEMPERATURE: 97.4 F | BODY MASS INDEX: 44.1 KG/M2 | HEART RATE: 74 BPM | SYSTOLIC BLOOD PRESSURE: 132 MMHG | OXYGEN SATURATION: 98 % | WEIGHT: 315 LBS | DIASTOLIC BLOOD PRESSURE: 84 MMHG | HEIGHT: 71 IN

## 2021-06-09 PROCEDURE — 99072 ADDL SUPL MATRL&STAF TM PHE: CPT

## 2021-06-09 PROCEDURE — 99214 OFFICE O/P EST MOD 30 MIN: CPT

## 2021-06-09 NOTE — REASON FOR VISIT
[Follow-Up] : a follow-up visit [Sleep Apnea] : sleep apnea [Shortness of Breath] : shortness of breath [Wheezing] : wheezing [Obesity] : obesity

## 2021-06-09 NOTE — CONSULT LETTER
[Dear  ___] : Dear  [unfilled], [Consult Letter:] : I had the pleasure of evaluating your patient, [unfilled]. [Please see my note below.] : Please see my note below. [Consult Closing:] : Thank you very much for allowing me to participate in the care of this patient.  If you have any questions, please do not hesitate to contact me. [Sincerely,] : Sincerely, [DrMary  ___] : Dr. MONROE [FreeTextEntry3] : Maciel Shah MD, FCCP, D. ABSM\par Pulmonary and Sleep Medicine\par [facility] at [place]

## 2021-06-09 NOTE — HISTORY OF PRESENT ILLNESS
[Follow-Up] : follow-up of [Currently Experiencing] : The patient is currently experiencing symptoms. [Inhaled Albuterol] : inhaled albuterol [Good Compliance] : good compliance with treatment [Good Tolerance] : good tolerance of treatment [Poor Compliance] : poor compliance with treatment [Poor Tolerance] : poor tolerance of treatment [Poor Symptom Control] : poor symptom control [Follow-Up - Routine Clinic] : a routine clinic follow-up of [Excess Weight] : excess weight [Inability to Lose Weight] : inability to lose weight [Dyspnea] : dyspnea [Back Pain] : back pain [Low Calorie Diet] : low calorie diet [Exercise Regimen] : exercise regimen [Fair Compliance] : fair compliance with treatment [Fair Tolerance] : fair tolerance of treatment [Fair Symptom Control] : fair symptom control [Sleep Apnea] : sleep apnea [High] : high [Low Calorie] : low calorie [On ___] : performed on [unfilled] [Patient] : the patient [To Assess ___] : to assess [unfilled] [None] : no new symptoms reported [FreeTextEntry1] : Patient reports occasional wheeze and SOB especially with exertion. He reports improvement with Advair which he is now only using as needed rather than BID as was prescribed.\par He was recently seen in [facility] for SOB but w/u was negative as per the patient and he was d/c'd without therapy. He now is back to baseline.\par SOB may be related to morbid obesity.\par He is s/p gastric sleeve surgery in 2016 with initial 60 lbs weight loss but gained 40 lbs back now. \par Pt was lost to f/u for 3 years\par Pt with CXR with ? RLL infiltrate vs atelectasis with elevated R hemidiaphragm [de-identified] : intermittent wheeze, SOBOE, and intermittent chest tightness [Morning Headaches] : no morning headaches [Shortness of Breath] : no shortness of breath [de-identified] : AutoCPAP [Diabetes] : no diabetes [Hypertension] : no hypertension [FreeTextEntry9] : CXR [FreeTextEntry8] : Mildly elevated R hemidiaphragm with ? infiltrate or compressive atelectasis

## 2021-06-09 NOTE — DISCUSSION/SUMMARY
[FreeTextEntry1] : \par #1. Diet and exercise for weight loss\par #2. Could consider Advair but for now will continue as needed ProAir for wheeze and exertional SOB; pt improved without chronic BD therapy\par #3. Exertional SOB and restrictive pattern on spirometry are likely weight related; follow lung function intermittently\par #4. Prior CXR from 2018 was clear by report and repeat with mildly elevated R hemidiaphragm with ? RLL infiltrate vs atelectasis; pt is asymptomatic therefore will observe off of therapy;  xray fluoroscopy to evaluate possible paralysis of R hemidiaphragm revealed no paradoxical motion but with moderately impaired motion of unclear etiology; could consider neurologic evaluation\par #5. Continue AutoCPAP 7-16 cm of water for moderate JESSICA; improved from previous with weight loss; encouraged improved compliance\par #6.  F/u in 4-6 weeks month for reevaluation with repeat compliance to assess response to nasal mask\par #7. Consider eventual MCT to r/o asthma if intermittent SOB and wheeze persist but appears to be improved\par #8. Follow lung function periodically\par #9. Consider bariatric surgery re-evaluation given weight gain; also recommended nutritionist eval\par #10. Replace equipment as needed; ordered again 6/9/21; gave and ordered AirFit N30i mask \par #11. Referred patient for evaluation by obesity medicine previously; he will continue f/u\par #12. Pt had both Covid vaccines \par #13. Reviewed risks of exposure and symptoms of Covid-19 virus, including how the virus is spread and precautions to avoid anita virus.\par \par Patient's questions were answered and patient appears to understand these recommendations

## 2021-07-07 ENCOUNTER — APPOINTMENT (OUTPATIENT)
Dept: PULMONOLOGY | Facility: CLINIC | Age: 56
End: 2021-07-07
Payer: MEDICARE

## 2021-07-07 VITALS
HEIGHT: 71 IN | SYSTOLIC BLOOD PRESSURE: 120 MMHG | OXYGEN SATURATION: 96 % | DIASTOLIC BLOOD PRESSURE: 76 MMHG | BODY MASS INDEX: 44.1 KG/M2 | RESPIRATION RATE: 16 BRPM | WEIGHT: 315 LBS | HEART RATE: 78 BPM

## 2021-07-07 DIAGNOSIS — Z87.898 PERSONAL HISTORY OF OTHER SPECIFIED CONDITIONS: ICD-10-CM

## 2021-07-07 PROCEDURE — 99072 ADDL SUPL MATRL&STAF TM PHE: CPT

## 2021-07-07 PROCEDURE — 99214 OFFICE O/P EST MOD 30 MIN: CPT

## 2021-07-07 NOTE — DISCUSSION/SUMMARY
[FreeTextEntry1] : \par #1. Diet and exercise for weight loss\par #2. Could consider Advair but for now will continue as needed ProAir for wheeze and exertional SOB; pt improved without chronic BD therapy\par #3. Exertional SOB and restrictive pattern on spirometry are likely weight related; follow lung function intermittently\par #4. Prior CXR from 2018 was clear by report and repeat with mildly elevated R hemidiaphragm with ? RLL infiltrate vs atelectasis; pt is asymptomatic therefore will observe off of therapy;  xray fluoroscopy to evaluate possible paralysis of R hemidiaphragm revealed no paradoxical motion but with moderately impaired motion of unclear etiology; could consider neurologic evaluation but he would like to hold off for now\par #5. Continue AutoCPAP 7-16 cm of water for moderate JESSICA; improved from previous with weight loss; encouraged improved compliance\par #6.  F/u in 3 months month for reevaluation with repeat compliance to assess response to nasal mask\par #7. Consider eventual MCT to r/o asthma if intermittent SOB and wheeze persist but appears to be improved\par #8. Follow lung function periodically\par #9. Consider bariatric surgery re-evaluation given weight gain; also recommended nutritionist eval\par #10. Replace equipment as needed; ordered again 6/9/21; gave and ordered AirFit N30i mask previously\par #11. Referred patient for evaluation by obesity medicine previously; he will continue f/u\par #12. Recommended Covid vaccines \par #13. Reviewed risks of exposure and symptoms of Covid-19 virus, including how the virus is spread and precautions to avoid anita virus.\par \par Patient's questions were answered and patient appears to understand these recommendations

## 2021-07-07 NOTE — HISTORY OF PRESENT ILLNESS
[Follow-Up] : follow-up of [Currently Experiencing] : The patient is currently experiencing symptoms. [Inhaled Albuterol] : inhaled albuterol [Good Compliance] : good compliance with treatment [Good Tolerance] : good tolerance of treatment [Poor Compliance] : poor compliance with treatment [Poor Tolerance] : poor tolerance of treatment [Poor Symptom Control] : poor symptom control [Follow-Up - Routine Clinic] : a routine clinic follow-up of [Excess Weight] : excess weight [Inability to Lose Weight] : inability to lose weight [Dyspnea] : dyspnea [Back Pain] : back pain [Low Calorie Diet] : low calorie diet [Exercise Regimen] : exercise regimen [Fair Compliance] : fair compliance with treatment [Fair Tolerance] : fair tolerance of treatment [Fair Symptom Control] : fair symptom control [Sleep Apnea] : sleep apnea [High] : high [Low Calorie] : low calorie [On ___] : performed on [unfilled] [Patient] : the patient [To Assess ___] : to assess [unfilled] [None] : no new symptoms reported [FreeTextEntry1] : Patient reports occasional wheeze and SOB especially with exertion. He reports improvement with Advair which he is now only using as needed rather than BID as was prescribed.\par He was recently seen in [facility] for SOB but w/u was negative as per the patient and he was d/c'd without therapy. He now is back to baseline.\par SOB may be related to morbid obesity.\par He is s/p gastric sleeve surgery in 2016 with initial 60 lbs weight loss but gained 40 lbs back now. \par Pt was lost to f/u for 3 years\par Pt with CXR with ? RLL infiltrate vs atelectasis with elevated R hemidiaphragm [de-identified] : intermittent wheeze, SOBOE, and intermittent chest tightness [Morning Headaches] : no morning headaches [Shortness of Breath] : no shortness of breath [de-identified] : AutoCPAP [Diabetes] : no diabetes [Hypertension] : no hypertension [FreeTextEntry9] : CXR [FreeTextEntry8] : Mildly elevated R hemidiaphragm with ? infiltrate or compressive atelectasis

## 2021-07-07 NOTE — CONSULT LETTER
[Dear  ___] : Dear  [unfilled], [Consult Letter:] : I had the pleasure of evaluating your patient, [unfilled]. [Please see my note below.] : Please see my note below. [Consult Closing:] : Thank you very much for allowing me to participate in the care of this patient.  If you have any questions, please do not hesitate to contact me. [Sincerely,] : Sincerely, [DrMary  ___] : Dr. MONROE [FreeTextEntry3] : Maciel Shah MD, FCCP, D. ABSM\par Pulmonary and Sleep Medicine\par [facility] [place]

## 2021-07-16 ENCOUNTER — APPOINTMENT (OUTPATIENT)
Dept: BARIATRICS/WEIGHT MGMT | Facility: CLINIC | Age: 56
End: 2021-07-16

## 2021-07-19 ENCOUNTER — APPOINTMENT (OUTPATIENT)
Dept: BARIATRICS/WEIGHT MGMT | Facility: CLINIC | Age: 56
End: 2021-07-19

## 2021-08-13 ENCOUNTER — APPOINTMENT (OUTPATIENT)
Dept: BARIATRICS/WEIGHT MGMT | Facility: CLINIC | Age: 56
End: 2021-08-13

## 2021-08-31 ENCOUNTER — APPOINTMENT (OUTPATIENT)
Dept: PULMONOLOGY | Facility: CLINIC | Age: 56
End: 2021-08-31
Payer: MEDICARE

## 2021-08-31 VITALS
DIASTOLIC BLOOD PRESSURE: 80 MMHG | HEART RATE: 95 BPM | OXYGEN SATURATION: 95 % | WEIGHT: 315 LBS | SYSTOLIC BLOOD PRESSURE: 137 MMHG | BODY MASS INDEX: 45.33 KG/M2

## 2021-08-31 PROCEDURE — 99214 OFFICE O/P EST MOD 30 MIN: CPT

## 2021-08-31 NOTE — HISTORY OF PRESENT ILLNESS
[Follow-Up] : follow-up of [Currently Experiencing] : The patient is currently experiencing symptoms. [Inhaled Albuterol] : inhaled albuterol [Good Compliance] : good compliance with treatment [Good Tolerance] : good tolerance of treatment [Poor Compliance] : poor compliance with treatment [Poor Tolerance] : poor tolerance of treatment [Poor Symptom Control] : poor symptom control [Follow-Up - Routine Clinic] : a routine clinic follow-up of [Excess Weight] : excess weight [Inability to Lose Weight] : inability to lose weight [Dyspnea] : dyspnea [Back Pain] : back pain [Low Calorie Diet] : low calorie diet [Exercise Regimen] : exercise regimen [Fair Compliance] : fair compliance with treatment [Fair Tolerance] : fair tolerance of treatment [Fair Symptom Control] : fair symptom control [Sleep Apnea] : sleep apnea [High] : high [Low Calorie] : low calorie [On ___] : performed on [unfilled] [Patient] : the patient [To Assess ___] : to assess [unfilled] [None] : no new symptoms reported [FreeTextEntry1] : Patient reports occasional wheeze and SOB especially with exertion. He reports improvement with Advair which he is now only using as needed rather than BID as was prescribed.\par He was recently seen in [facility] for SOB but w/u was negative as per the patient and he was d/c'd without therapy. He now is back to baseline.\par SOB may be related to morbid obesity.\par He is s/p gastric sleeve surgery in 2016 with initial 60 lbs weight loss but gained 40 lbs back now. \par Pt was lost to f/u for 3 years\par Pt with CXR with ? RLL infiltrate vs atelectasis with elevated R hemidiaphragm [de-identified] : intermittent wheeze, SOBOE, and intermittent chest tightness [Morning Headaches] : no morning headaches [Shortness of Breath] : no shortness of breath [de-identified] : AutoCPAP [Diabetes] : no diabetes [Hypertension] : no hypertension [FreeTextEntry9] : CXR [FreeTextEntry8] : Mildly elevated R hemidiaphragm with ? infiltrate or compressive atelectasis

## 2021-08-31 NOTE — DISCUSSION/SUMMARY
[FreeTextEntry1] : \par #1. Diet and exercise for weight loss\par #2. Could consider Advair but for now will continue as needed ProAir for wheeze and exertional SOB; pt improved without chronic BD therapy\par #3. Exertional SOB and restrictive pattern on spirometry are likely weight related; follow lung function intermittently\par #4. Prior CXR from 2018 was clear by report and repeat with mildly elevated R hemidiaphragm with ? RLL infiltrate vs atelectasis; pt is asymptomatic therefore will observe off of therapy;  xray fluoroscopy to evaluate possible paralysis of R hemidiaphragm revealed no paradoxical motion but with moderately impaired motion of unclear etiology; could consider neurologic evaluation but he would like to hold off for now\par #5. Continue AutoCPAP 7-16 cm of water for moderate JESSICA; improved from previous with weight loss; encouraged improved compliance and ordered replacement equipment 8/31/21 \par #6.  F/u in 3 months month for reevaluation once he has completed w/u for oral appliance therapy given his current intolerance to CPAP therapy\par #7. Consider eventual MCT to r/o asthma if intermittent SOB and wheeze persist but appears to be improved\par #8. Follow lung function periodically\par #9. Consider bariatric surgery re-evaluation given weight gain; also recommended nutritionist eval\par #10. Replace equipment as needed; ordered again 8/31/21; gave and ordered AirFit N30i mask previously\par #11. Referred patient for evaluation by obesity medicine previously; he will continue f/u\par #12. Recommended Covid vaccines \par #13. Reviewed risks of exposure and symptoms of Covid-19 virus, including how the virus is spread and precautions to avoid anita virus.\par \par Patient's questions were answered and patient appears to understand these recommendations

## 2021-08-31 NOTE — CONSULT LETTER
[Dear  ___] : Dear  [unfilled], [Consult Letter:] : I had the pleasure of evaluating your patient, [unfilled]. [Please see my note below.] : Please see my note below. [Consult Closing:] : Thank you very much for allowing me to participate in the care of this patient.  If you have any questions, please do not hesitate to contact me. [Sincerely,] : Sincerely, [DrMary  ___] : Dr. MONROE [FreeTextEntry3] : Maciel Shah MD, FCCP, D. ABSM\par Pulmonary and Sleep Medicine\par [facility] at [place]

## 2021-09-02 ENCOUNTER — APPOINTMENT (OUTPATIENT)
Dept: CARDIOLOGY | Facility: CLINIC | Age: 56
End: 2021-09-02
Payer: MEDICARE

## 2021-09-02 VITALS
RESPIRATION RATE: 16 BRPM | HEIGHT: 71 IN | WEIGHT: 315 LBS | HEART RATE: 74 BPM | BODY MASS INDEX: 44.1 KG/M2 | OXYGEN SATURATION: 98 % | DIASTOLIC BLOOD PRESSURE: 82 MMHG | SYSTOLIC BLOOD PRESSURE: 122 MMHG

## 2021-09-02 PROCEDURE — 93000 ELECTROCARDIOGRAM COMPLETE: CPT

## 2021-09-02 PROCEDURE — 99214 OFFICE O/P EST MOD 30 MIN: CPT

## 2021-09-02 RX ORDER — VALSARTAN 160 MG/1
160 TABLET, COATED ORAL DAILY
Qty: 90 | Refills: 2 | Status: DISCONTINUED | COMMUNITY
Start: 2019-09-18 | End: 2021-09-02

## 2021-09-02 NOTE — ASSESSMENT
[FreeTextEntry1] : ECG:  Sinus  Rhythm at 74 BPM -Poor R-wave progression -nonspecific -consider old anterior infarct. \par -Nonspecific intraventricular conduction delay, nonspecific T wave abnormality\par \par Lab data 12/7/20\par Creat. 1.39\par \par 11/23/20\par HGB 14.5\par A1C 6.4\par \par \par Lab data \par       6/14/19 7/20/21( No Statin)\par Chol. 299         256\par LDL  196          178\par HDL    47           44\par Tri.    191         172\par \par Creat 1.34\par \par Echocardiogram 3/8/2021:\par Left ventricular dilatation, with LVH and mildly reduced global systolic function.  Ejection fraction about 45%\par Left atrial enlargement\par Mild pulmonary hypertension 41 mm Hg\par Mild dilatation of the ascending aorta 4 cm\par \par Echo 2/17/20\par LVH with mildly reduced LVEF 45%\par Diast Filling abn\par LAE\par PAP = 39 mm Hg\par Asc aorta 4 cm\par \par Impression:\par 1. Nonischemic cardiomyopathy with stable mild global LV systolic dysfunction.. Etiology unclear possibly     multifactorial. The role of chronic morbid obesity, prior untreated sleep apnea, long-standing hypertension, all     need to be considered. This was explained to the patient in some detail. \par \par 2. Sleep apnea treatment seems to be inconsistent.  Most recent report 1/11/2021 shows that he is compliant at          about 50% for more than 4 hours a night \par \par 3. History of morbid obesity, prior bariatric surgery and weight is down about 5 pounds from last visit.  He is following up regularly with Dr Marybeth Wright, who has been providing nutritional counseling\par \par 4. No evidence of sig coronary artery disease on the basis of angiography 2018\par \par 5. Severe pulmonary artery hypertension by cath, milder on echo, possibly related to sleep apnea. \par     Non ischemic cardiomyopathy   LVEF 45%. No no signs of HF on exam today. \par \par 6.  Blood pressure seems adequately controlled\par \par 7. Glycemic index has been elevated, no current value available\par \par 8. Chest pain which is  reproducible and unlikely cardiac in nature as his last cath. showed no obstructive disease.  FANG which is not new and multifactorial in regards to his weight, JESSICA,  PAH and cardiomyopathy, \par \par Plan\par 1.  Add back atorvastatin 40 mg p.o. daily and obtain laboratory data in 3 months\par 2.  Instructed the patient about the benefits of a diet that restricts portion sizes, increases frequency of meals and consists of  vegetables, (more green and leafy),fruit and nuts, whole grains, lean proteins and limits carbohydrates and meat and dairy fats\par    \par 3. CMP and lipid due\par 4.  The patient was instructed to follow a symptom limited regimen of moderate aerobic exercise for 30 minutes 3 to 4 days a week. A warm up and cool down period are to be added to the regimen and small incremental changes can be made every few weeks. Any new symptoms of exercise related chest pain or dyspnea should be reported.\par 5.  Compliance with CPAP\par 6.  Encouraged the patient to obtain vaccination for COVID-19.  The risks and benefits of vaccination versus lack thereof were discussed.\par 7.  Office visit here in 4 months.  Anticipate repeat echocardiography March 2022\par Clinical follow up in 4 months \par

## 2021-09-02 NOTE — REASON FOR VISIT
[FreeTextEntry1] : This is a 55  year old male patient presenting for cardiac re- evaluation \par \par 1. Pericarditis in 2016\par 2. Long standing morbid obesity\par 3. Nonischemic cardiomyopathy\par 4. CAD\par 5. Hyperlipemia\par 6. Bariatric surgery\par 7.  Pulmonary artery hypertension\par 8.  Obstructive sleep apnea\par \par There is also FANG especially when walking up a flight of stairs or bringing the garbage to the street, this is not new.\par Using CPAP \par \par Recently saw Dr. Shah for SOB and noted improvement in is FANG with Advair and had a negative w.u for SOB at [facility].\par \par HE is s/p gastric sleeve in 2016 and his weight has fluctuated, max weight loss 60 lbs. \par But has gained back 40 pounds\par \par Hospitalized June 2018 for a chest pain syndrome.\par Cardiac catheterization demonstrated absence of any significant coronary artery disease.\par The cardiac filling pressures were, however, quite elevated with moderately severe pulmonary artery hypertension as well as a high pulmonary capillary wedge pressure.\par \par In hospital  echocardiogram revealed a decreased EF 40-45% with mildly decreased left ventricular systolic function and mild left ventricular hypertrophy.\par \par The patient was subsequently started on beta blockers, ACE inhibitors and diuretics.\par \par Seems to have stopped taking his atorvastatin at some point.\par Valsartan was replaced with lisinopril.\par \par Has not been vaccinated against COVID-19 yet.\par \par He does not exercise, being limited by some orthopedic issues as recently it is his ankle.\par

## 2021-09-02 NOTE — PHYSICAL EXAM
[Normal Conjunctiva] : the conjunctiva exhibited no abnormalities [Eyelids - No Xanthelasma] : the eyelids demonstrated no xanthelasmas [Normal Oral Mucosa] : normal oral mucosa [No Oral Pallor] : no oral pallor [No Oral Cyanosis] : no oral cyanosis [Normal Jugular Venous A Waves Present] : normal jugular venous A waves present [Normal Jugular Venous V Waves Present] : normal jugular venous V waves present [No Jugular Venous Fernández A Waves] : no jugular venous fernández A waves [Abnormal Walk] : normal gait [Gait - Sufficient For Exercise Testing] : the gait was sufficient for exercise testing [Nail Clubbing] : no clubbing of the fingernails [Cyanosis, Localized] : no localized cyanosis [Petechial Hemorrhages (___cm)] : no petechial hemorrhages [Skin Color & Pigmentation] : normal skin color and pigmentation [] : no rash [No Venous Stasis] : no venous stasis [Skin Lesions] : no skin lesions [No Skin Ulcers] : no skin ulcer [No Xanthoma] : no  xanthoma was observed [Oriented To Time, Place, And Person] : oriented to person, place, and time [Mood] : the mood was normal [Affect] : the affect was normal [No Anxiety] : not feeling anxious [FreeTextEntry1] : Obese, soft, nontender.

## 2021-09-02 NOTE — HISTORY OF PRESENT ILLNESS
[FreeTextEntry1] : His medical/cardiac history includes:\par \par In 2016 as advised he completed a sleep study which showed moderate sleep apnea with an overall  AHI of 24.7 events/HR\par \par \par \par \par \par

## 2021-10-06 ENCOUNTER — APPOINTMENT (OUTPATIENT)
Dept: PULMONOLOGY | Facility: CLINIC | Age: 56
End: 2021-10-06

## 2021-10-07 ENCOUNTER — APPOINTMENT (OUTPATIENT)
Dept: CARDIOLOGY | Facility: CLINIC | Age: 56
End: 2021-10-07

## 2021-10-12 ENCOUNTER — APPOINTMENT (OUTPATIENT)
Dept: CARDIOLOGY | Facility: CLINIC | Age: 56
End: 2021-10-12
Payer: MEDICARE

## 2021-10-12 VITALS
SYSTOLIC BLOOD PRESSURE: 118 MMHG | WEIGHT: 315 LBS | RESPIRATION RATE: 17 BRPM | DIASTOLIC BLOOD PRESSURE: 78 MMHG | OXYGEN SATURATION: 96 % | HEART RATE: 69 BPM | HEIGHT: 71 IN | BODY MASS INDEX: 44.1 KG/M2

## 2021-10-12 PROCEDURE — 99214 OFFICE O/P EST MOD 30 MIN: CPT

## 2021-10-12 PROCEDURE — 93000 ELECTROCARDIOGRAM COMPLETE: CPT

## 2021-10-12 RX ORDER — MOXIFLOXACIN HYDROCHLORIDE TABLETS, 400 MG 400 MG/1
400 TABLET, FILM COATED ORAL
Qty: 10 | Refills: 0 | Status: DISCONTINUED | COMMUNITY
Start: 2021-08-25 | End: 2021-10-12

## 2021-10-12 RX ORDER — ERGOCALCIFEROL (VITAMIN D2) 50 MCG
50 MCG CAPSULE ORAL
Refills: 0 | Status: DISCONTINUED | COMMUNITY
End: 2021-10-12

## 2021-10-12 RX ORDER — OMEPRAZOLE 40 MG/1
40 CAPSULE, DELAYED RELEASE ORAL
Refills: 0 | Status: DISCONTINUED | COMMUNITY
End: 2021-10-12

## 2021-10-13 ENCOUNTER — APPOINTMENT (OUTPATIENT)
Dept: PULMONOLOGY | Facility: CLINIC | Age: 56
End: 2021-10-13
Payer: MEDICARE

## 2021-10-13 VITALS
WEIGHT: 315 LBS | HEART RATE: 78 BPM | SYSTOLIC BLOOD PRESSURE: 128 MMHG | OXYGEN SATURATION: 96 % | RESPIRATION RATE: 16 BRPM | HEIGHT: 71 IN | DIASTOLIC BLOOD PRESSURE: 76 MMHG | BODY MASS INDEX: 44.1 KG/M2

## 2021-10-13 PROCEDURE — 99215 OFFICE O/P EST HI 40 MIN: CPT

## 2021-10-13 RX ORDER — SEMAGLUTIDE 1.34 MG/ML
2 INJECTION, SOLUTION SUBCUTANEOUS
Qty: 3 | Refills: 0 | Status: DISCONTINUED | COMMUNITY
Start: 2020-12-14 | End: 2021-10-13

## 2021-10-13 NOTE — HISTORY OF PRESENT ILLNESS
Addended by: ERMIAS PENA on: 2/11/2020 12:32 PM     Modules accepted: Orders     [Follow-Up] : follow-up of [Currently Experiencing] : The patient is currently experiencing symptoms. [Inhaled Albuterol] : inhaled albuterol [Good Compliance] : good compliance with treatment [Good Tolerance] : good tolerance of treatment [Poor Compliance] : poor compliance with treatment [Poor Tolerance] : poor tolerance of treatment [Poor Symptom Control] : poor symptom control [Follow-Up - Routine Clinic] : a routine clinic follow-up of [Excess Weight] : excess weight [Inability to Lose Weight] : inability to lose weight [Dyspnea] : dyspnea [Back Pain] : back pain [Low Calorie Diet] : low calorie diet [Exercise Regimen] : exercise regimen [Fair Compliance] : fair compliance with treatment [Fair Tolerance] : fair tolerance of treatment [Fair Symptom Control] : fair symptom control [Sleep Apnea] : sleep apnea [High] : high [Low Calorie] : low calorie [On ___] : performed on [unfilled] [Patient] : the patient [To Assess ___] : to assess [unfilled] [None] : no new symptoms reported [FreeTextEntry1] : Patient reports occasional wheeze and SOB especially with exertion. He reports improvement with Advair which he is now only using as needed.\par He was recently seen in [facility] for SOB but w/u was negative as per the patient and he was d/c'd without therapy. SOB may be related to morbid obesity.\par He is s/p gastric sleeve surgery in 2016 with initial 60 lbs weight loss but gained 40 lbs back now. \par Pt was lost to f/u for 3 years\par Pt with CXR with ? RLL infiltrate vs atelectasis with elevated R hemidiaphragm.\par He was recently hospitalized for SOB and was thought to have CHF and now presents for f/u post-hospitalization. He feels he is near baseline. [de-identified] : intermittent wheeze, SOBOE, and intermittent chest tightness [Morning Headaches] : no morning headaches [Shortness of Breath] : no shortness of breath [de-identified] : AutoCPAP [Diabetes] : no diabetes [Hypertension] : no hypertension [FreeTextEntry9] : CXR [FreeTextEntry8] : Mildly elevated R hemidiaphragm with ? infiltrate or compressive atelectasis

## 2021-10-13 NOTE — DISCUSSION/SUMMARY
[FreeTextEntry1] : \par #1. Diet and exercise for weight loss\par #2. Could consider Advair but for now will continue as needed ProAir for wheeze and exertional SOB; pt improved without chronic BD therapy\par #3. Exertional SOB and restrictive pattern on spirometry are likely weight related; follow lung function intermittently\par #4. Prior CXR from 2018 was clear by report and repeat with mildly elevated R hemidiaphragm with ? RLL infiltrate vs atelectasis; pt is asymptomatic therefore will observe off of therapy;  xray fluoroscopy to evaluate possible paralysis of R hemidiaphragm revealed no paradoxical motion but with moderately impaired motion of unclear etiology; could consider neurologic evaluation but he would like to hold off for now; will repeat CXR to re-evaluate increased markings possibly related to infection/inflammation vs CHF\par #5. Continue AutoCPAP 7-16 cm of water for moderate JESSICA; improved from previous with weight loss; encouraged improved compliance and ordered replacement equipment 8/31/21 and encouraged compliance if can tolerate; CPAP therapy does seem to be effective when he uses it\par #6.  F/u in 2 months month for reevaluation once he has completed w/u for oral appliance therapy given his current intolerance to CPAP therapy and with repeat PFTs\par #7. Consider eventual MCT to r/o asthma if intermittent SOB and wheeze persist but appears to be improved\par #8. Follow lung function periodically but will repeat on next visit\par #9. Consider bariatric surgery re-evaluation given weight gain; also recommended nutritionist lawrence\par #10. Replace equipment as needed; ordered again 8/31/21; gave and ordered AirFit N30i mask previously\par #11. Referred patient for evaluation by obesity medicine previously; he will continue f/u\par #12. Recommended Covid vaccines; s/p one Covid vaccine\par #13. Reviewed risks of exposure and symptoms of Covid-19 virus, including how the virus is spread and precautions to avoid anita virus.\par \par Patient's questions were answered and patient appears to understand these recommendations

## 2021-10-13 NOTE — ASSESSMENT
[FreeTextEntry1] : ECG:  Sinus  Rhythm at 69  BPM -Poor R-wave progression -nonspecific -consider old anterior infarct. , Occ PVC, \par -Nonspecific intraventricular conduction delay, nonspecific T wave abnormality\par \par Lab data 12/7/20\par Creat. 1.39\par \par 11/23/20\par HGB 14.5\par A1C 6.4\par \par \par Lab data \par       6/14/19 7/20/21( No Statin)\par Chol. 299         256\par LDL  196          178\par HDL    47           44\par Tri.    191         172\par \par Creat 1.34\par \par Echocardiogram 3/8/2021:\par Left ventricular dilatation, with LVH and mildly reduced global systolic function.  Ejection fraction about 45%\par Left atrial enlargement\par Mild pulmonary hypertension 41 mm Hg\par Mild dilatation of the ascending aorta 4 cm\par \par Echo 2/17/20\par LVH with mildly reduced LVEF 45%\par Diast Filling abn\par LAE\par PAP = 39 mm Hg\par Asc aorta 4 cm\par \par \par Status post 2-day hospitalization for acute heart failure with reduced ejection fraction likely triggered by exogenous sodium overload due to dietary indiscretion\par \par Impression:\par 1. Nonischemic cardiomyopathy with stable mild global LV systolic dysfunction.. Etiology unclear possibly     multifactorial. The role of chronic morbid obesity, prior untreated sleep apnea, long-standing hypertension, all     need to be considered. This was explained to the patient in some detail. \par \par 2. Sleep apnea treatment seems to be inconsistent.  Most recent report 1/11/2021 shows that he is compliant at          about 50% for more than 4 hours a night \par \par 3. History of morbid obesity, prior bariatric surgery and weight is down about 5 pounds from last visit.  He is following up regularly with Dr Marybeth Wright, who has been providing nutritional counseling\par \par 4. No evidence of sig coronary artery disease on the basis of angiography 2018\par \par 5. Severe pulmonary artery hypertension by cath, milder on echo, possibly related to sleep apnea. \par     Non ischemic cardiomyopathy   LVEF 45%. No no signs of HF on exam today. \par \par 6.  Blood pressure seems adequately controlled\par \par 7. Glycemic index has been elevated, no current value available\par \par 8. Chest pain which is  reproducible and unlikely cardiac in nature as his last cath. showed no obstructive disease.  FANG which is not new and multifactorial in regards to his weight, JESSICA,  PAH and cardiomyopathy, \par \par Plan\par 1.  Add back atorvastatin 40 mg p.o. daily and obtain laboratory data in 3 months\par 2.  Instructed the patient about the benefits of a diet that restricts portion sizes, increases frequency of meals and consists of  vegetables, (more green and leafy),fruit and nuts, whole grains, lean proteins and limits carbohydrates and meat and dairy fats\par    \par 3.  Reviewed dietary limitations as regards processed foods and the need to keep his sodium content below 2000 mg a day\par 4.  The patient was instructed to follow a symptom limited regimen of moderate aerobic exercise for 30 minutes 3 to 4 days a week. A warm up and cool down period are to be added to the regimen and small incremental changes can be made every few weeks. Any new symptoms of exercise related chest pain or dyspnea should be reported.\par 5.  Compliance with CPAP\par 6.  No change in management is indicated at this point in time.\par 7.  Office visit here in 4 months.  Anticipate repeat echocardiography March 2022\par Clinical follow up in 4 months \par

## 2021-10-13 NOTE — CONSULT LETTER
[Dear  ___] : Dear  [unfilled], [Consult Letter:] : I had the pleasure of evaluating your patient, [unfilled]. [Please see my note below.] : Please see my note below. [Consult Closing:] : Thank you very much for allowing me to participate in the care of this patient.  If you have any questions, please do not hesitate to contact me. [Sincerely,] : Sincerely, [DrMary  ___] : Dr. MONROE [FreeTextEntry3] : Maciel Shah MD, FCCP, D. ABSM\par Pulmonary and Sleep Medicine\par [facility] at [place]

## 2021-10-13 NOTE — REASON FOR VISIT
[FreeTextEntry1] : This is a 56   year old male patient presenting for cardiac re- evaluation after hospitalization 10/6/2021 to 10/7/2021 for acute congestive heart failure.  \par Patient presented with fairly sudden onset of orthopnea shortness of breath, possibly precipitated by excess sodium and processed foods.  He was easily diuresed, improving and discharged for outpatient follow-up\par \par 1. Pericarditis in 2016\par 2. Long standing morbid obesity\par 3. Nonischemic cardiomyopathy\par 4. CAD\par 5. Hyperlipemia\par 6. Bariatric surgery\par 7.  Pulmonary artery hypertension\par 8.  Obstructive sleep apnea\par \par There is also FANG especially when walking up a flight of stairs or bringing the garbage to the street, this is not new.\par Using CPAP \par \par Recently saw Dr. Shah for SOB and noted improvement in is FANG with Advair and had a negative w.u for SOB at [facility].\par \par HE is s/p gastric sleeve in 2016 and his weight has fluctuated, max weight loss 60 lbs. \par But has gained back 40 pounds\par \par Hospitalized June 2018 for a chest pain syndrome.\par Cardiac catheterization demonstrated absence of any significant coronary artery disease.\par The cardiac filling pressures were, however, quite elevated with moderately severe pulmonary artery hypertension as well as a high pulmonary capillary wedge pressure.\par \par In hospital  echocardiogram revealed a decreased EF 40-45% with mildly decreased left ventricular systolic function and mild left ventricular hypertrophy.\par \par The patient was subsequently started on beta blockers, ACE inhibitors and diuretics.\par \par He does not exercise, being limited by some orthopedic issues as recently it is his ankle.\par

## 2021-11-27 ENCOUNTER — APPOINTMENT (OUTPATIENT)
Dept: DISASTER EMERGENCY | Facility: CLINIC | Age: 56
End: 2021-11-27

## 2021-11-30 ENCOUNTER — APPOINTMENT (OUTPATIENT)
Dept: PULMONOLOGY | Facility: CLINIC | Age: 56
End: 2021-11-30

## 2021-12-17 ENCOUNTER — APPOINTMENT (OUTPATIENT)
Dept: DISASTER EMERGENCY | Facility: CLINIC | Age: 56
End: 2021-12-17

## 2021-12-21 ENCOUNTER — APPOINTMENT (OUTPATIENT)
Dept: PULMONOLOGY | Facility: CLINIC | Age: 56
End: 2021-12-21

## 2021-12-22 ENCOUNTER — NON-APPOINTMENT (OUTPATIENT)
Age: 56
End: 2021-12-22

## 2021-12-22 NOTE — PATIENT PROFILE ADULT. - NS PRO OT REFERRAL QUES 2 YN
Father given message from Dr Estrada.  Encouraged father to have pt to eat iron rich foods-can go to [employer] website to find a list of iron rich foods.  Father verbalized understanding of results/information.   no

## 2022-01-04 ENCOUNTER — APPOINTMENT (OUTPATIENT)
Dept: CARDIOLOGY | Facility: CLINIC | Age: 57
End: 2022-01-04
Payer: MEDICARE

## 2022-01-04 VITALS
RESPIRATION RATE: 16 BRPM | HEIGHT: 71 IN | WEIGHT: 312 LBS | OXYGEN SATURATION: 98 % | SYSTOLIC BLOOD PRESSURE: 130 MMHG | BODY MASS INDEX: 43.68 KG/M2 | HEART RATE: 70 BPM | DIASTOLIC BLOOD PRESSURE: 90 MMHG

## 2022-01-04 PROCEDURE — 99214 OFFICE O/P EST MOD 30 MIN: CPT

## 2022-01-04 PROCEDURE — 93000 ELECTROCARDIOGRAM COMPLETE: CPT

## 2022-01-04 RX ORDER — LISINOPRIL 20 MG/1
20 TABLET ORAL DAILY
Qty: 90 | Refills: 3 | Status: DISCONTINUED | COMMUNITY
Start: 2021-09-02 | End: 2022-01-04

## 2022-01-04 NOTE — ASSESSMENT
[FreeTextEntry1] : ECG:  Sinus  Rhythm at 70   BPM -Poor R-wave progression -nonspecific -consider old anterior infarct. , Occ PVC, \par -Nonspecific intraventricular conduction delay, nonspecific T wave abnormality\par \par Lab data 12/7/20\par Creat. 1.39\par \par 11/23/20\par HGB 14.5\par A1C 6.4\par \par \par Lab data \par       6/14/19 7/20/21( No Statin)\par Chol. 299         256\par LDL  196          178\par HDL    47           44\par Tri.    191         172\par \par Creat 1.34\par \par Echocardiogram 3/8/2021:\par Left ventricular dilatation, with LVH and mildly reduced global systolic function.  Ejection fraction about 45%\par Left atrial enlargement\par Mild pulmonary hypertension 41 mm Hg\par Mild dilatation of the ascending aorta 4 cm\par \par Echo 2/17/20\par LVH with mildly reduced LVEF 45%\par Diast Filling abn\par LAE\par PAP = 39 mm Hg\par Asc aorta 4 cm\par \par \par Status post 2-day hospitalization 10/21 for acute heart failure with reduced ejection fraction likely triggered by exogenous sodium overload due to dietary indiscretion\par \par Impression:\par 1. Nonischemic cardiomyopathy with stable mild global LV systolic dysfunction.. Etiology unclear possibly     multifactorial. The role of chronic morbid obesity, prior untreated sleep apnea, long-standing hypertension, all     need to be considered. This was explained to the patient in some detail. \par \par 2. Sleep apnea treatment seems to be inconsistent.  Not using CPAP and seems interested in an oral appliance.\par     Uncertain whether he is involved sleep medicine in this decision making.\par \par 3. History of morbid obesity, prior bariatric surgery and weight is down about 5 pounds from last visit.  He is     following up regularly with Dr Marybeth Wright, who has been providing nutritional counseling\par \par 4. No evidence of sig coronary artery disease on the basis of angiography 2018\par \par 5. Severe pulmonary artery hypertension by cath, milder on echo, possibly related to sleep apnea. \par     Non ischemic cardiomyopathy   LVEF 45%. No no signs of HF on exam today. \par \par 6.  Blood pressure is usually adequately controlled\par \par 7. Glycemic index has been elevated, no current value available\par \par 8. Chest pain which is  reproducible and unlikely cardiac in nature as his last cath. showed no obstructive disease.  FANG which is not new and multifactorial in regards to his weight, JESSICA,  PAH and cardiomyopathy, \par \par Plan\par 1.  He is back on atorvastatin 40 mg p.o. daily and current labs from PMD none available\par \par 2.  Instructed the patient about the benefits of a diet that restricts portion sizes, increases frequency of meals and consists of  vegetables, (more green and leafy),fruit and nuts, whole grains, lean proteins and limits carbohydrates and meat and dairy fats\par    \par 3.  Reviewed dietary limitations as regards processed foods and the need to keep his sodium content below 2000 mg a day\par \par 4.  The patient was instructed to follow a symptom limited regimen of moderate aerobic exercise for 30 minutes 3 to 4 days a week. A warm up and cool down period are to be added to the regimen and small incremental changes can be made every few weeks. Any new symptoms of exercise related chest pain or dyspnea should be reported.\par \par 5.  Compliance with CPAP and follow-up with sleep medicine\par \par 6.  Follow-up echocardiography to reassess ejection fraction and pulmonary artery pressures.\par \par 7.  Office visit here in 6 months.  Anticipate repeat echocardiography March 2022\par Clinical follow up in 6 months \par

## 2022-01-04 NOTE — REASON FOR VISIT
[FreeTextEntry1] : This is a 56 year old male patient presenting for cardiac re- evaluation.\par States that he has been stable since his last visit here in October and actually feels less short of breath than he had been 6 to 12 months ago.\par \par S/p hospitalization 10/6/2021 to 10/7/2021 for acute congestive heart failure.  \par Patient presented with fairly sudden onset of orthopnea shortness of breath, possibly precipitated by excess sodium and processed foods.  He was easily diuresed, improving and discharged for outpatient follow-up\par \par 1. Pericarditis in 2016\par 2. Long standing morbid obesity\par 3. Nonischemic cardiomyopathy\par 4. CAD\par 5. Hyperlipemia\par 6. Bariatric surgery\par 7.  Pulmonary artery hypertension\par 8.  Obstructive sleep apnea\par \par There is also FANG especially when walking up a flight of stairs or bringing the garbage to the street, this is not new.\par Using CPAP no longer using CPAP.  Looking for an oral appliance.  Has not recently discussed with pulmonary sleep medicine.\par \par HE is s/p gastric sleeve in 2016 and his weight has fluctuated, max weight loss 60 lbs. \par But has gained back 40 pounds\par \par Hospitalized June 2018 for a chest pain syndrome.\par Cardiac catheterization demonstrated absence of any significant coronary artery disease.\par The cardiac filling pressures were, however, quite elevated with moderately severe pulmonary artery hypertension as well as a high pulmonary capillary wedge pressure.\par \par In hospital  echocardiogram 2018 revealed a decreased EF 40-45% with mildly decreased left ventricular systolic function and mild left ventricular hypertrophy.\par \par The patient was subsequently started on beta blockers, ACE inhibitors and diuretics.\par \par He does not exercise, being limited by some orthopedic issues as recently it is his ankle.\par

## 2022-01-11 ENCOUNTER — APPOINTMENT (OUTPATIENT)
Dept: CARDIOLOGY | Facility: CLINIC | Age: 57
End: 2022-01-11

## 2022-02-24 ENCOUNTER — APPOINTMENT (OUTPATIENT)
Dept: CARDIOLOGY | Facility: CLINIC | Age: 57
End: 2022-02-24
Payer: MEDICARE

## 2022-02-24 VITALS
BODY MASS INDEX: 44.1 KG/M2 | DIASTOLIC BLOOD PRESSURE: 90 MMHG | HEIGHT: 71 IN | RESPIRATION RATE: 16 BRPM | HEART RATE: 61 BPM | SYSTOLIC BLOOD PRESSURE: 130 MMHG | OXYGEN SATURATION: 98 % | WEIGHT: 315 LBS

## 2022-02-24 PROCEDURE — 93000 ELECTROCARDIOGRAM COMPLETE: CPT

## 2022-02-24 PROCEDURE — 99215 OFFICE O/P EST HI 40 MIN: CPT

## 2022-02-24 RX ORDER — FUROSEMIDE 40 MG/1
40 TABLET ORAL
Qty: 90 | Refills: 1 | Status: DISCONTINUED | COMMUNITY
Start: 2018-07-09 | End: 2022-02-24

## 2022-02-24 NOTE — PHYSICAL EXAM
[Normal Conjunctiva] : the conjunctiva exhibited no abnormalities [Eyelids - No Xanthelasma] : the eyelids demonstrated no xanthelasmas [Normal Oral Mucosa] : normal oral mucosa [No Oral Pallor] : no oral pallor [No Oral Cyanosis] : no oral cyanosis [Normal Jugular Venous A Waves Present] : normal jugular venous A waves present [Normal Jugular Venous V Waves Present] : normal jugular venous V waves present [No Jugular Venous Fernández A Waves] : no jugular venous fernández A waves [FreeTextEntry1] : Obese, soft, nontender. [Abnormal Walk] : normal gait [Gait - Sufficient For Exercise Testing] : the gait was sufficient for exercise testing [Nail Clubbing] : no clubbing of the fingernails [Cyanosis, Localized] : no localized cyanosis [Petechial Hemorrhages (___cm)] : no petechial hemorrhages [Skin Color & Pigmentation] : normal skin color and pigmentation [No Venous Stasis] : no venous stasis [] : no rash [Skin Lesions] : no skin lesions [No Skin Ulcers] : no skin ulcer [No Xanthoma] : no  xanthoma was observed [Oriented To Time, Place, And Person] : oriented to person, place, and time [Affect] : the affect was normal [Mood] : the mood was normal [No Anxiety] : not feeling anxious

## 2022-02-24 NOTE — HISTORY OF PRESENT ILLNESS
[FreeTextEntry1] : His medical/cardiac history includes:\par \par In 2016 as advised he completed a sleep study which showed moderate sleep apnea with an overall  AHI of 24.7 events/HR\par Currently not being treated consistently\par \par \par \par \par \par

## 2022-02-24 NOTE — REASON FOR VISIT
[FreeTextEntry1] : This is a 56 year old male patient presenting for cardiac re- evaluation after short hospitalization for CHF exacerbation [facility] 2/19/2022.\par Patient describes several weeks of progressive swelling culminating in orthopnea PND leg edema 8 pound weight gain.  Has not been using CPAP lately.  Believes that that exacerbated his CHF.\par Treated with IV Lasix with good urine response and improvement in symptoms.\par \par S/p hospitalization 10/6/2021 to 10/7/2021 for acute congestive heart failure.  \par Patient presented with fairly sudden onset of orthopnea shortness of breath, possibly precipitated by excess sodium and processed foods.  He was easily diuresed, improving and discharged for outpatient follow-up\par \par 1. Pericarditis in 2016\par 2. Long standing morbid obesity\par 3. Nonischemic cardiomyopathy\par 4. CAD\par 5. Hyperlipemia\par 6. Bariatric surgery\par 7.  Pulmonary artery hypertension\par 8.  Obstructive sleep apnea\par \par There is also FANG especially when walking up a flight of stairs or bringing the garbage to the street, this is not new.\par No longer using CPAP.  Looking for an oral appliance.  Has not recently discussed with pulmonary sleep medicine.\par \par HE is s/p gastric sleeve in 2016 and his weight has fluctuated, max weight loss 60 lbs. \par But has gained back much of this.\par Up 12 pounds since his last visit in January\par \par Hospitalized June 2018 for a chest pain syndrome.\par Cardiac catheterization demonstrated absence of any significant coronary artery disease.\par The cardiac filling pressures were, however, quite elevated with moderately severe pulmonary artery hypertension as well as a high pulmonary capillary wedge pressure.\par \par In hospital  echocardiogram 2018 revealed a decreased EF 40-45% with mildly decreased left ventricular systolic function and mild left ventricular hypertrophy.\par \par The patient was subsequently started on beta blockers, ACE inhibitors and diuretics.\par \par He does not exercise, being limited by some orthopedic issues as recently it is his ankle.\par

## 2022-02-24 NOTE — ASSESSMENT
[FreeTextEntry1] : ECG:  Sinus  Rhythm at 61  BPM -Poor R-wave progression -nonspecific -consider old anterior infarct. , Occ PVC, \par -Nonspecific intraventricular conduction delay, nonspecific T wave abnormality\par \par Lab data \par ---12/7/20-----219/22\par Creat. 1.39---1.4\par \par 11/23/20\par HGB 14.5\par A1C 6.4\par \par \par Lab data \par       6/14/19 7/20/21( No Statin)\par Chol. 299         256\par LDL  196          178\par HDL    47           44\par Tri.    191         172\par \par Creat 1.34\par \par Echocardiogram 3/8/2021:\par Left ventricular dilatation, with LVH and mildly reduced global systolic function.  Ejection fraction about 45%\par Left atrial enlargement\par Mild pulmonary hypertension 41 mm Hg\par Mild dilatation of the ascending aorta 4 cm\par \par Echo 2/17/20\par LVH with mildly reduced LVEF 45%\par Diast Filling abn\par LAE\par PAP = 39 mm Hg\par Asc aorta 4 cm\par \par Status post yet another 2-day hospitalization 2/19/2022 for acute heart failure with reduced ejection fraction likely triggered by exogenous sodium overload due to dietary indiscretion\par \par Impression:\par 1. Nonischemic cardiomyopathy with stable mild global LV systolic dysfunction.. Etiology unclear possibly     multifactorial. The role of chronic morbid obesity, prior untreated sleep apnea, long-standing hypertension, all     need to be considered. This was explained to the patient in some detail. \par \par 2. Sleep apnea treatment seems to be inconsistent.  Not using CPAP and seems interested in an oral appliance.\par     Uncertain whether he is involved sleep medicine in this decision making.\par \par 3. History of morbid obesity, prior bariatric surgery and up 12 pounds since last visit.\par \par 4. No evidence of sig coronary artery disease on the basis of angiography 2018\par \par 5. Severe pulmonary artery hypertension by cath, milder on echo, possibly related to sleep apnea. \par     Non ischemic cardiomyopathy   LVEF 45%. No no signs of HF on exam today. \par \par 6.  Blood pressure is usually adequately controlled\par \par 7. Glycemic index has been elevated, no current value available\par \par \par Plan\par 1.  He is back on atorvastatin 40 mg p.o. daily and current labs from PMD none available\par \par 2.  Instructed the patient about the benefits of a diet that restricts portion sizes, increases frequency of meals and consists of  vegetables, (more green and leafy),fruit and nuts, whole grains, lean proteins and limits carbohydrates and meat and dairy fats\par    \par 3.  Reviewed dietary limitations as regards processed foods and the need to keep his sodium content below 2000 mg a day.\par Explained to the patient and his significant other again the role that dietary compliance plays in keeping him out of the hospital and out of congestive heart failure.\par I recommended:\par 36 fluid ounce restriction\par 2000 mg sodium restriction\par 1600-calorie a day limitation\par \par 4.  The patient was instructed to follow a symptom limited regimen of moderate aerobic exercise for 30 minutes 3 to 4 days a week. A warm up and cool down period are to be added to the regimen and small incremental changes can be made every few weeks. Any new symptoms of exercise related chest pain or dyspnea should be reported.\par \par 5.  Compliance with CPAP and follow-up with sleep medicine\par \par 6.  Follow-up echocardiography to reassess ejection fraction and pulmonary artery pressures.\par \par 7.  Office visit here in 3 months.  Anticipate repeat echocardiography March 2022\par Clinical follow up in 6 months \par

## 2022-02-28 ENCOUNTER — NON-APPOINTMENT (OUTPATIENT)
Age: 57
End: 2022-02-28

## 2022-03-15 ENCOUNTER — APPOINTMENT (OUTPATIENT)
Dept: PULMONOLOGY | Facility: CLINIC | Age: 57
End: 2022-03-15

## 2022-03-24 NOTE — REASON FOR VISIT
[Sleep Apnea] : sleep apnea [Shortness of Breath] : shortness of breath [Wheezing] : wheezing [Obesity] : obesity [Follow-Up - From Hospitalization] : a follow-up visit after a recent hospitalization IT

## 2022-04-27 LAB
ALBUMIN SERPL ELPH-MCNC: 4.5 G/DL
ALP BLD-CCNC: 46 U/L
ALT SERPL-CCNC: 24 U/L
ANION GAP SERPL CALC-SCNC: 12 MMOL/L
AST SERPL-CCNC: 25 U/L
BILIRUB SERPL-MCNC: 0.3 MG/DL
BUN SERPL-MCNC: 19 MG/DL
CALCIUM SERPL-MCNC: 9.3 MG/DL
CHLORIDE SERPL-SCNC: 103 MMOL/L
CHOLEST SERPL-MCNC: 282 MG/DL
CO2 SERPL-SCNC: 31 MMOL/L
CREAT SERPL-MCNC: 1.41 MG/DL
EGFR: 58 ML/MIN/1.73M2
GLUCOSE SERPL-MCNC: 105 MG/DL
HDLC SERPL-MCNC: 44 MG/DL
LDLC SERPL CALC-MCNC: 207 MG/DL
NONHDLC SERPL-MCNC: 239 MG/DL
POTASSIUM SERPL-SCNC: 4.1 MMOL/L
PROT SERPL-MCNC: 7.4 G/DL
SODIUM SERPL-SCNC: 145 MMOL/L
TRIGL SERPL-MCNC: 159 MG/DL

## 2022-04-27 RX ORDER — EZETIMIBE 10 MG/1
10 TABLET ORAL DAILY
Qty: 90 | Refills: 3 | Status: ACTIVE | COMMUNITY
Start: 2022-04-27 | End: 1900-01-01

## 2022-04-27 RX ORDER — ROSUVASTATIN CALCIUM 40 MG/1
40 TABLET, FILM COATED ORAL DAILY
Qty: 90 | Refills: 3 | Status: ACTIVE | COMMUNITY
Start: 2022-04-27 | End: 1900-01-01

## 2022-04-27 RX ORDER — ATORVASTATIN CALCIUM 40 MG/1
40 TABLET, FILM COATED ORAL
Qty: 90 | Refills: 3 | Status: DISCONTINUED | COMMUNITY
Start: 1900-01-01 | End: 2022-04-27

## 2022-05-03 ENCOUNTER — NON-APPOINTMENT (OUTPATIENT)
Age: 57
End: 2022-05-03

## 2022-05-04 ENCOUNTER — APPOINTMENT (OUTPATIENT)
Dept: CARDIOLOGY | Facility: CLINIC | Age: 57
End: 2022-05-04
Payer: MEDICARE

## 2022-05-04 VITALS
HEIGHT: 71 IN | WEIGHT: 315 LBS | HEART RATE: 61 BPM | DIASTOLIC BLOOD PRESSURE: 86 MMHG | RESPIRATION RATE: 16 BRPM | BODY MASS INDEX: 44.1 KG/M2 | SYSTOLIC BLOOD PRESSURE: 112 MMHG

## 2022-05-04 PROCEDURE — 99214 OFFICE O/P EST MOD 30 MIN: CPT

## 2022-05-04 PROCEDURE — 93000 ELECTROCARDIOGRAM COMPLETE: CPT

## 2022-05-04 RX ORDER — COLCHICINE 0.6 MG/1
0.6 TABLET, FILM COATED ORAL
Refills: 0 | Status: DISCONTINUED | COMMUNITY
End: 2022-05-04

## 2022-05-04 NOTE — HISTORY OF PRESENT ILLNESS
[FreeTextEntry1] : His medical/cardiac history includes:\par \par In 2016 as advised he completed a sleep study which showed moderate sleep apnea with an overall  AHI of 24.7 events/HR\par Has not followed up with pulmonary/sleep medicine\par Currently not being treated consistently\par \par \par \par \par \par

## 2022-05-04 NOTE — REASON FOR VISIT
[FreeTextEntry1] : This is a 56 year old male patient presenting for cardiac re- evaluation \par \par Had short hospitalization for CHF exacerbation [facility] 2/19/2022 for several weeks of progressive swelling culminating in orthopnea, PND, leg edema, 8 pound weight gain.  Has not been using CPAP lately.  Believes that that exacerbated his CHF.\par Treated with IV Lasix with good urine response and improvement in symptoms.\par \par Also had hospitalization 10/6/2021 to 10/7/2021 for acute HF \par Patient had sudden onset of orthopnea shortness of breath, possibly precipitated by excess sodium and processed foods.  He was easily diuresed, improving and discharged for outpatient follow-up\par \par 1. Pericarditis in 2016\par 2. Long standing morbid obesity\par 3. Nonischemic cardiomyopathy\par 4. CAD\par 5. Hyperlipemia\par 6. Bariatric surgery\par 7.  Pulmonary artery hypertension\par 8.  Obstructive sleep apnea\par \par There is also FANG especially when walking up a flight of stairs or bringing the garbage to the street, this is not new.\par No longer using CPAP.  Looking for an oral appliance.  Has not recently discussed with pulmonary sleep medicine.\par \par HE is s/p gastric sleeve in 2016 and his weight has fluctuated, max weight loss 60 lbs. \par But has gained back much of this.\par Up 12 pounds since his last visit in January\par \par Hospitalized June 2018 for a chest pain syndrome.\par Cardiac catheterization demonstrated absence of any significant coronary artery disease.\par The cardiac filling pressures were, however, quite elevated with moderately severe pulmonary artery hypertension as well as a high pulmonary capillary wedge pressure.\par \par In hospital  echocardiogram 2018 revealed a decreased EF 40-45% with mildly decreased left ventricular systolic function and mild left ventricular hypertrophy.\par \par States that has been on a much more aggressive diet lately, eating mostly vegetables and lean proteins.\par Watching processed foods and less sodium intake as well.\par \par Is trying to moderately exercise as well.\par \par Denies any recent increase in shortness of breath or edema.\par \par

## 2022-05-04 NOTE — ASSESSMENT
[FreeTextEntry1] : ECG:  Sinus  Rhythm at 61  BPM -Poor R-wave progression -nonspecific -consider old anterior infarct. , Occ PVC, \par -Nonspecific intraventricular conduction delay, nonspecific T wave abnormality\par \par Lab data \par ---12/7/20-----219/22\par Creat. 1.39---1.4\par \par 11/23/20\par HGB 14.5\par A1C 6.4\par \par \par Lab data ------( No Statin)\par -----6/14/19----7/20/21---4/27/22\par Chol.--299-------256-------282\par LDL---196-------178-------207\par HDl-----47--------44--------44\par Tri-----191-------172-------159\par \par Creat 1.34-------------------1.4\par \par Echocardiogram 3/8/2021:\par Left ventricular dilatation, with LVH and mildly reduced global systolic function.  Ejection fraction about 45%\par Left atrial enlargement\par Mild pulmonary hypertension 41 mm Hg\par Mild dilatation of the ascending aorta 4 cm\par \par Echo 2/17/20\par LVH with mildly reduced LVEF 45%\par Diast Filling abn\par LAE\par PAP = 39 mm Hg\par Asc aorta 4 cm\par \par Status post yet another 2-day hospitalization 2/19/2022 for acute heart failure with reduced ejection fraction likely triggered by exogenous sodium overload due to dietary indiscretion\par \par Impression:\par 1. Nonischemic cardiomyopathy with stable mild global LV systolic dysfunction.. Etiology unclear possibly     multifactorial. The role of chronic morbid obesity, prior untreated sleep apnea, long-standing hypertension, all     need to be considered. This was explained to the patient in some detail. \par \par 2. Sleep apnea treatment seems to be inconsistent.  Not using CPAP and seems interested in an oral appliance.\par     Uncertain whether he is involved sleep medicine in this decision making.\par \par 3. History of morbid obesity, prior bariatric surgery and up 2 pounds since last visit, despite very significant change in diet/lifestyle.\par \par 4. No evidence of sig coronary artery disease on the basis of angiography 2018\par \par 5. Severe pulmonary artery hypertension by cath, milder on echo, possibly related to sleep apnea. \par     Non ischemic cardiomyopathy   LVEF 45%. No no signs of HF on exam today. \par \par 6.  Blood pressure is usually adequately controlled\par \par 7. Glycemic index has been elevated, no current value available\par \par 8.  Marked hyperlipidemia, seemingly unaffected by treatment with atorvastatin.\par      uncertain whether patient has begun rosuvastatin and Zetia prescribed last week.\par \par Plan\par 1.  Rosuvastatin 40 and Zetia 10 mg daily with follow-up blood work in 3 months\par \par 2.  Instructed the patient about the benefits of a diet that restricts portion sizes, increases frequency of meals and consists of  vegetables, (more green and leafy),fruit and nuts, whole grains, lean proteins and limits carbohydrates and meat and dairy fats\par    \par 3.  Reviewed dietary limitations as regards processed foods and the need to keep his sodium content below 2000 mg a day.\par Explained to the patient and his significant other again the role that dietary compliance plays in keeping him out of the hospital and out of congestive heart failure.\par I recommended:\par 36 fluid ounce restriction\par 2000 mg sodium restriction\par 1600-calorie a day limitation\par \par 4.  The patient was instructed to follow a symptom limited regimen of moderate aerobic exercise for 30 minutes 3 to 4 days a week. A warm up and cool down period are to be added to the regimen and small incremental changes can be made every few weeks. Any new symptoms of exercise related chest pain or dyspnea should be reported.\par \par 5.  Compliance with CPAP and follow-up with sleep medicine\par \par 6.  Follow-up echocardiography to reassess ejection fraction and pulmonary artery pressures.\par \par 7.  Office visit here in 3 months. \par \par

## 2022-06-01 ENCOUNTER — MED ADMIN CHARGE (OUTPATIENT)
Age: 57
End: 2022-06-01

## 2022-06-01 ENCOUNTER — APPOINTMENT (OUTPATIENT)
Dept: CARDIOLOGY | Facility: CLINIC | Age: 57
End: 2022-06-01
Payer: MEDICARE

## 2022-06-01 PROCEDURE — 93306 TTE W/DOPPLER COMPLETE: CPT

## 2022-06-01 RX ORDER — PERFLUTREN 6.52 MG/ML
6.52 INJECTION, SUSPENSION INTRAVENOUS
Qty: 2 | Refills: 0 | Status: COMPLETED | OUTPATIENT
Start: 2022-06-01

## 2022-06-01 RX ADMIN — PERFLUTREN MG/ML: 6.52 INJECTION, SUSPENSION INTRAVENOUS at 00:00

## 2022-06-02 ENCOUNTER — APPOINTMENT (OUTPATIENT)
Dept: PULMONOLOGY | Facility: CLINIC | Age: 57
End: 2022-06-02
Payer: MEDICARE

## 2022-06-02 ENCOUNTER — APPOINTMENT (OUTPATIENT)
Dept: GASTROENTEROLOGY | Facility: CLINIC | Age: 57
End: 2022-06-02

## 2022-06-02 VITALS
BODY MASS INDEX: 46.03 KG/M2 | RESPIRATION RATE: 16 BRPM | OXYGEN SATURATION: 95 % | SYSTOLIC BLOOD PRESSURE: 132 MMHG | WEIGHT: 315 LBS | HEART RATE: 66 BPM | DIASTOLIC BLOOD PRESSURE: 90 MMHG

## 2022-06-02 DIAGNOSIS — E78.5 HYPERLIPIDEMIA, UNSPECIFIED: ICD-10-CM

## 2022-06-02 PROCEDURE — 99215 OFFICE O/P EST HI 40 MIN: CPT

## 2022-06-02 RX ORDER — ERGOCALCIFEROL 1.25 MG/1
1.25 MG CAPSULE, LIQUID FILLED ORAL
Refills: 0 | Status: ACTIVE | COMMUNITY

## 2022-06-02 RX ORDER — VALSARTAN 160 MG/1
160 TABLET, COATED ORAL DAILY
Qty: 90 | Refills: 3 | Status: DISCONTINUED | COMMUNITY
End: 2022-06-02

## 2022-06-02 RX ORDER — LOSARTAN POTASSIUM 25 MG/1
25 TABLET, FILM COATED ORAL
Refills: 0 | Status: ACTIVE | COMMUNITY
Start: 2022-06-02

## 2022-06-02 NOTE — CONSULT LETTER
[Dear  ___] : Dear  [unfilled], [Consult Letter:] : I had the pleasure of evaluating your patient, [unfilled]. [Please see my note below.] : Please see my note below. [Consult Closing:] : Thank you very much for allowing me to participate in the care of this patient.  If you have any questions, please do not hesitate to contact me. [Sincerely,] : Sincerely, [DrMary  ___] : Dr. MONROE [FreeTextEntry3] : Maciel Shah MD, FCCP, D. ABSM\par Pulmonary and Sleep Medicine\par [facility] [place]

## 2022-06-02 NOTE — DISCUSSION/SUMMARY
[FreeTextEntry1] : \par #1. Diet and exercise for weight loss\par #2. Could consider Advair but for now will continue as needed ProAir for wheeze and exertional SOB; pt improved clinically without chronic BD therapy\par #3. Exertional SOB and restrictive pattern on spirometry are likely weight related; follow lung function intermittently if pt allows\par #4. Prior CXR from 2018 was clear by report and repeat with mildly elevated R hemidiaphragm with ? RLL infiltrate vs atelectasis; xray fluoroscopy to evaluate possible paralysis of R hemidiaphragm revealed no paradoxical motion but with moderately impaired motion of unclear etiology; could consider neurologic evaluation but he has not been interested; consider repeat imaging studies to f/u pulm edema and atelectasis if pt allows \par #5. Rec AutoCPAP 7-16 cm of water for moderate JESSICA which had improved from previous with weight loss; encouraged compliance with CPAP or oral appliance therapy as pt can tolerate; CPAP therapy does seem to be effective when he uses it; The patient understands the risks of untreated JESSICA including heart disease, strokes, hypertension, pulmonary hypertension, and motor vehicle accidents as well as the need for treatment and weight loss. \par #6.  Recommended second pulmonary opinion since he seemed frustrated with his current condition and lack of improvement despite therapy\par #7. Consider eventual MCT to r/o asthma if intermittent SOB and wheeze persist but pt is not interested\par #8. Rec that lung function should be followed periodically when pt is willing\par #9. Consider bariatric surgery re-evaluation given weight gain; also recommended nutritionist eval; info given to pt\par #10. Replace equipment as needed if uses CPAP; gave and ordered AirFit [id number] mask previously\par #11. F/u with obesity medicine for weight loss\par #12. Recommended Covid vaccines; s/p one Covid vaccine\par #13. Pt expresses no interest in f/u therefore will f/u in this office as needed\par #14. Reviewed risks of exposure and symptoms of Covid-19 virus, including how the virus is spread and precautions to avoid anita virus.\par \par The patient expressed understanding and agreement with the above recommendations/plan and accepts responsibility to be compliant with recommended testing, therapies, and f/u visits.\par All relevant questions and concerns were addressed. \par Discussed above with patient and wife who was also present.

## 2022-06-02 NOTE — REVIEW OF SYSTEMS
[SOB on Exertion] : sob on exertion [Depression] : depression [Negative] : Endocrine [Anxiety] : no anxiety

## 2022-06-02 NOTE — HISTORY OF PRESENT ILLNESS
[Follow-Up] : follow-up of [Currently Experiencing] : The patient is currently experiencing symptoms. [Inhaled Albuterol] : inhaled albuterol [Good Compliance] : good compliance with treatment [Good Tolerance] : good tolerance of treatment [Poor Compliance] : poor compliance with treatment [Poor Tolerance] : poor tolerance of treatment [Poor Symptom Control] : poor symptom control [Follow-Up - Routine Clinic] : a routine clinic follow-up of [Excess Weight] : excess weight [Inability to Lose Weight] : inability to lose weight [Dyspnea] : dyspnea [Back Pain] : back pain [Low Calorie Diet] : low calorie diet [Exercise Regimen] : exercise regimen [Fair Compliance] : fair compliance with treatment [Fair Tolerance] : fair tolerance of treatment [Fair Symptom Control] : fair symptom control [Sleep Apnea] : sleep apnea [High] : high [Low Calorie] : low calorie [On ___] : performed on [unfilled] [Patient] : the patient [To Assess ___] : to assess [unfilled] [None] : no new symptoms reported [Never] : never [TextBox_4] : Patient reports occasional wheeze and SOB especially with exertion. He reports improvement with Advair which he is now only using as needed.\par He was recently seen in [facility] for SOB but w/u was negative as per the patient and he was d/c'd without therapy. SOB may be related to morbid obesity.\par Pt was lost to f/u for 3 years\par Pt with CXR with ? RLL infiltrate vs atelectasis with elevated R hemidiaphragm.\par He was previously hospitalized for SOB and was thought to have CHF. He reports multiple CHF exac and feels that this is caused by his CPAP therapy though I explained to him that CPAP should help CHF and his underlying cardiac function so refuses to use any more. He has an oral appliance which he apparently uses on occasion.\par He is very frustrated with his condition which I explained would like improve significantly with weight loss given his BMI>46. \par He also has restriction on PFTs which also may be weight related. He had lost weight after gastric sleeve surgery in 2016 but has gained most of it back. Prior imaging studies revealed only mild basilar atelectasis and possible pulm edema. \par W/u for elevated hemidiaphragm revealed impaired motion but no paralysis of the hemidiaphragm.\par Pt expresses he has no interested in further CPAP therapy for JESSICA and no longer wants to f/u. [de-identified] : intermittent wheeze, SOBOE, and intermittent chest tightness [Morning Headaches] : no morning headaches [Shortness of Breath] : no shortness of breath [de-identified] : AutoCPAP [Diabetes] : no diabetes [Hypertension] : no hypertension [FreeTextEntry9] : CXR [FreeTextEntry8] : Mildly elevated R hemidiaphragm with ? infiltrate or compressive atelectasis

## 2022-06-02 NOTE — REASON FOR VISIT
[Follow-Up - From Hospitalization] : a follow-up visit after a recent hospitalization [Sleep Apnea] : sleep apnea [Shortness of Breath] : shortness of breath [Wheezing] : wheezing [Obesity] : obesity [Spouse] : spouse

## 2022-06-14 ENCOUNTER — APPOINTMENT (OUTPATIENT)
Dept: CARDIOLOGY | Facility: CLINIC | Age: 57
End: 2022-06-14
Payer: MEDICARE

## 2022-06-14 VITALS
WEIGHT: 315 LBS | RESPIRATION RATE: 11 BRPM | DIASTOLIC BLOOD PRESSURE: 84 MMHG | HEART RATE: 64 BPM | BODY MASS INDEX: 44.1 KG/M2 | HEIGHT: 71 IN | SYSTOLIC BLOOD PRESSURE: 124 MMHG

## 2022-06-14 DIAGNOSIS — I42.8 OTHER CARDIOMYOPATHIES: ICD-10-CM

## 2022-06-14 DIAGNOSIS — I27.21 SECONDARY PULMONARY ARTERIAL HYPERTENSION: ICD-10-CM

## 2022-06-14 DIAGNOSIS — E11.9 TYPE 2 DIABETES MELLITUS W/OUT COMPLICATIONS: ICD-10-CM

## 2022-06-14 DIAGNOSIS — I25.10 ATHEROSCLEROTIC HEART DISEASE OF NATIVE CORONARY ARTERY W/OUT ANGINA PECTORIS: ICD-10-CM

## 2022-06-14 DIAGNOSIS — F32.A DEPRESSION, UNSPECIFIED: ICD-10-CM

## 2022-06-14 DIAGNOSIS — I10 ESSENTIAL (PRIMARY) HYPERTENSION: ICD-10-CM

## 2022-06-14 PROCEDURE — 99214 OFFICE O/P EST MOD 30 MIN: CPT

## 2022-06-14 PROCEDURE — 93000 ELECTROCARDIOGRAM COMPLETE: CPT

## 2022-06-14 RX ORDER — CYCLOBENZAPRINE HYDROCHLORIDE 5 MG/1
5 TABLET, FILM COATED ORAL
Qty: 30 | Refills: 0 | Status: DISCONTINUED | COMMUNITY
Start: 2022-04-08

## 2022-06-14 NOTE — REASON FOR VISIT
[FreeTextEntry1] : This is a 57 year old male patient presenting for cardiac re- evaluation contemplating bariatric surgery in the future\par \par Hospitalization for CHF exacerbation [facility] 2/19/2022 \par Had  several weeks of progressive swelling culminating in orthopnea, PND, leg edema, 8 pound weight gain.  Has not been using CPAP lately.  Believes that that exacerbates his CHF.\par Treated with IV Lasix with good urine response and improvement in symptoms.\par \par Prior hospitalization 10/6/2021 to 10/7/2021 for acute HF \par Patient had sudden onset of orthopnea shortness of breath, possibly precipitated by excess sodium and processed foods.  He was easily diuresed, improving and discharged for outpatient follow-up\par \par 1. Pericarditis in 2016\par 2. Long standing morbid obesity\par 3. Nonischemic cardiomyopathy\par 4. CAD\par 5. Hyperlipemia\par 6. Bariatric surgery\par 7.  Pulmonary artery hypertension\par 8.  Obstructive sleep apnea\par \par There is also FANG especially when walking up a flight of stairs or bringing the garbage to the street, this is not new.\par No longer using CPAP.  Looking for an oral appliance.  Had recent follow-up with pulmonary/sleep medicine.  They report on restrictive lung disease likely obesity related.\par He was counseled about the need for CPAP for effective treatment of his sleep apnea which he has declined.\par \par HE is s/p gastric sleeve in 2016 and his weight has fluctuated, max weight loss 60 lbs. \par But has gained back much of this.\par \par Hospitalized June 2018 for a chest pain syndrome.\par Cardiac catheterization demonstrated absence of any significant coronary artery disease.\par The cardiac filling pressures were, however, quite elevated with moderately severe pulmonary artery hypertension as well as a high pulmonary capillary wedge pressure.\par \par In hospital  echocardiogram 2018 revealed a decreased EF 40-45% with mildly decreased left ventricular systolic function and mild left ventricular hypertrophy.\par \par States that has been on a much more aggressive diet lately, eating mostly vegetables and lean proteins.\par Watching processed foods and less sodium intake as well.\par \par Is trying to moderately exercise as well.\par \par Denies any recent increase in shortness of breath or edema.\par \par

## 2022-06-14 NOTE — ASSESSMENT
[FreeTextEntry1] : ECG:  Sinus  Rhythm at 64  BPM -Poor R-wave progression -nonspecific -consider old anterior infarct. , \par -Nonspecific intraventricular conduction delay, nonspecific T wave abnormality\par \par Lab data \par ---12/7/20-----219/22\par Creat. 1.39------1.4\par \par 11/23/20\par HGB 14.5\par A1C 6.4\par \par \par Lab data ------( No Statin)\par -----6/14/19----7/20/21---4/27/22\par Chol.--299-------256-------282\par LDL---196-------178-------207\par HDl-----47--------44--------44\par Tri-----191-------172-------159\par \par Creat 1.34-------------------1.4\par \par Echocardiogram 6/1/2022:\par Borderline LVH with low normal to mildly reduced left ventricular systolic function.  LVEF 50%\par Mild biatrial enlargement\par Mild dilatation ascending aorta 3.7 cm\par Small PFO\par Estimated pulmonary systolic pressure 32.4 mmHg.\par \par Echocardiogram 3/8/2021:\par Left ventricular dilatation, with LVH and mildly reduced global systolic function.  Ejection fraction about 45%\par Left atrial enlargement\par Mild pulmonary hypertension 41 mm Hg\par Mild dilatation of the ascending aorta 4 cm\par \par Echo 2/17/20\par LVH with mildly reduced LVEF 45%\par Diast Filling abn\par LAE\par PAP = 39 mm Hg\par Asc aorta 4 cm\par \par 2-day hospitalization 2/19/2022 for acute heart failure with reduced ejection fraction likely triggered by exogenous sodium overload due to dietary indiscretion\par \par Impression:\par 1. Nonischemic cardiomyopathy with stable mild global LV systolic dysfunction.. Etiology unclear possibly     multifactorial. The role of chronic morbid obesity, prior untreated sleep apnea, long-standing hypertension, all     need to be considered. This was explained to the patient in some detail. \par \par 2.  Severe sleep apnea undertreated.  Not using CPAP and seems interested in an oral appliance.\par     Follow-up with sleep medicine notes were reviewed.  Patient apparently declined to use of device\par \par 3. History of morbid obesity, prior bariatric surgery 2016 and up 2 pounds since last visit, despite very significant change in diet/lifestyle.  Currently scheduled for a consultation in anticipation of repeat bariatric surgery\par \par 4. No evidence of sig coronary artery disease on the basis of angiography 2018\par \par 5. Severe pulmonary artery hypertension by cath, milder on echo, possibly related to sleep apnea. \par     Non ischemic cardiomyopathy most recent LVEF 50%. No no signs of HF on exam today. \par \par 6.  Blood pressure is usually adequately controlled\par \par 7. Glycemic index has been elevated, no current value available\par \par 8.  Marked hyperlipidemia, seemingly unaffected by treatment with atorvastatin.\par      uncertain whether patient has begun rosuvastatin and Zetia\par \par Plan\par 1.  Rosuvastatin 40 and Zetia 10 mg daily with follow-up blood work in 3 months\par \par 2.  Instructed the patient about the benefits of a diet that restricts portion sizes, increases frequency of meals and consists of  vegetables, (more green and leafy),fruit and nuts, whole grains, lean proteins and limits carbohydrates and meat and dairy fats\par    \par 3.  Reviewed dietary limitations as regards processed foods and the need to keep his sodium content below 2000 mg a day.\par Explained to the patient and his significant other again the role that dietary compliance plays in keeping him out of the hospital and out of congestive heart failure.\par I recommended:\par 36 fluid ounce restriction\par 2000 mg sodium restriction\par 1600-calorie a day limitation\par \par 4.  The patient was instructed to follow a symptom limited regimen of moderate aerobic exercise for 30 minutes 3 to 4 days a week. A warm up and cool down period are to be added to the regimen and small incremental changes can be made every few weeks. Any new symptoms of exercise related chest pain or dyspnea should be reported.\par \par 5.  Compliance with CPAP and follow-up with sleep medicine\par \par 6.  Consideration of repeat bariatric surgery.\par \par 7.  Colonoscopy and possible upper endoscopy are planned in the near future as part of a work-up for diarrhea, screening colonoscopy and preop for bariatric surgery.\par There is no cardiac contraindication at this time to proceeding with panendoscopy.\par \par 8.  As regards bariatric surgery, preoperative exercise stress test recommended.\par Do not foresee any cardiac contraindication to repeat bariatric surgery as benefits certainly outweigh risks.  Office visit here in 3 months. \par \par

## 2022-06-27 ENCOUNTER — RX RENEWAL (OUTPATIENT)
Age: 57
End: 2022-06-27

## 2022-06-27 RX ORDER — TORSEMIDE 20 MG/1
20 TABLET ORAL DAILY
Qty: 90 | Refills: 3 | Status: ACTIVE | COMMUNITY
Start: 2022-06-27 | End: 1900-01-01

## 2022-07-06 ENCOUNTER — APPOINTMENT (OUTPATIENT)
Dept: BARIATRICS/WEIGHT MGMT | Facility: CLINIC | Age: 57
End: 2022-07-06

## 2022-08-29 ENCOUNTER — APPOINTMENT (OUTPATIENT)
Dept: CARDIOLOGY | Facility: CLINIC | Age: 57
End: 2022-08-29

## 2022-09-01 ENCOUNTER — APPOINTMENT (OUTPATIENT)
Dept: CARDIOLOGY | Facility: CLINIC | Age: 57
End: 2022-09-01

## 2022-09-02 ENCOUNTER — APPOINTMENT (OUTPATIENT)
Dept: CARDIOLOGY | Facility: CLINIC | Age: 57
End: 2022-09-02

## 2022-09-07 NOTE — ED PROVIDER NOTE - NS ED MD DISPO DISCHARGE
Home Information: Selecting Yes will display possible errors in your note based on the variables you have selected. This validation is only offered as a suggestion for you. PLEASE NOTE THAT THE VALIDATION TEXT WILL BE REMOVED WHEN YOU FINALIZE YOUR NOTE. IF YOU WANT TO FAX A PRELIMINARY NOTE YOU WILL NEED TO TOGGLE THIS TO 'NO' IF YOU DO NOT WANT IT IN YOUR FAXED NOTE.

## 2022-09-08 ENCOUNTER — APPOINTMENT (OUTPATIENT)
Dept: CARDIOLOGY | Facility: CLINIC | Age: 57
End: 2022-09-08

## 2022-09-13 ENCOUNTER — RX RENEWAL (OUTPATIENT)
Age: 57
End: 2022-09-13

## 2022-09-13 RX ORDER — POTASSIUM CHLORIDE 750 MG/1
10 CAPSULE, EXTENDED RELEASE ORAL
Qty: 90 | Refills: 1 | Status: ACTIVE | COMMUNITY
Start: 2022-09-13 | End: 1900-01-01

## 2022-09-14 ENCOUNTER — APPOINTMENT (OUTPATIENT)
Dept: BARIATRICS/WEIGHT MGMT | Facility: CLINIC | Age: 57
End: 2022-09-14

## 2022-10-24 VITALS
HEIGHT: 71 IN | RESPIRATION RATE: 20 BRPM | OXYGEN SATURATION: 99 % | SYSTOLIC BLOOD PRESSURE: 168 MMHG | DIASTOLIC BLOOD PRESSURE: 88 MMHG | WEIGHT: 315 LBS | HEART RATE: 66 BPM

## 2022-10-24 RX ORDER — CHLORHEXIDINE GLUCONATE 213 G/1000ML
1 SOLUTION TOPICAL ONCE
Refills: 0 | Status: DISCONTINUED | OUTPATIENT
Start: 2022-10-25 | End: 2022-11-09

## 2022-10-24 NOTE — H&P PST ADULT - ASSESSMENT
A 57 year old gentleman with a PMH of HTN, HLD, Pulmonary HTN, gout, asthma, NICM ( 2018), acute on chronic HFpEF, JESSICA, obesity, gastric sleeve surgery ( 2016), presenting to the cath holding area this am for a right and LHC with Dr Alexander Infante  to evaluate coronary arteries secondary to FANG & NST + for stress-induced NSVT.    PRE-PROCEDURE ASSESSMENT  -NPO after midnight confirmed  -IV insert  -Patient seen and examined  -Labs reviewed  -Pre-procedure teaching completed with patient   -Consent obtained by Interventional Cardiologist  -Questions answered  -Baby ASA 81 mg po preprocedure  -IVH as per protocol     A 57 year old gentleman with a PMH of HTN, HLD, Pulmonary HTN, gout, asthma, NICM ( 2018), acute on chronic HFpEF, JESSICA, obesity, gastric sleeve surgery ( 2016), presenting to the cath holding area this am for a right and C with Dr Alexander Infante  to evaluate coronary arteries secondary to FANG.& Abnormal NST + for stress-induced NSVT.& small zone of apical ischemia.        PRE-PROCEDURE ASSESSMENT  -NPO after midnight confirmed  -IV insert  -Patient seen and examined  -Labs reviewed  -Pre-procedure teaching completed with patient   -Consent obtained by Interventional Cardiologist  -Questions answered  -Baby ASA 81 mg po preprocedure  -IVH as per protocol     A 57 year old gentleman with a PMH of HTN, HLD, Pulmonary HTN, gout, asthma, NICM ( 2018), acute on chronic HFpEF, JESSICA, obesity, gastric sleeve surgery ( 2016), presenting to the cath holding area this am for a right and C with Dr Alexander Infante  to evaluate coronary arteries secondary to FANG.& Abnormal NST + for stress-induced NSVT.& small zone of apical ischemia.    PRE-PROCEDURE ASSESSMENT  -NPO after 6am confirmed  -IV insert  -Patient seen and examined  -Labs reviewed  -Pre-procedure teaching completed with patient   -Consent obtained by Interventional Cardiologist  -Questions answered  -Baby ASA 81 mg po preprocedure  -IVH as per protocol     A 57 year old gentleman with a PMH of HTN, HLD, Pulmonary HTN, gout, asthma, NICM ( 2018), acute on chronic HFpEF, JESSICA, obesity, gastric sleeve surgery ( 2016), presenting to the cath holding area this am for a right and C with Dr Alexander Infante  to evaluate coronary arteries secondary to FANG.& Abnormal NST + for stress-induced NSVT.& small zone of apical ischemia.    PRE-PROCEDURE ASSESSMENT  -NPO after 6am confirmed  -IV insert  -Patient seen and examined  -Labs reviewed K-3.4 supplementation ordered  -Pre-procedure teaching completed with patient   -Consent obtained by Interventional Cardiologist  -Questions answered  -Baby ASA 81 mg po preprocedure  -IVH as per protocol

## 2022-10-24 NOTE — H&P PST ADULT - HISTORY OF PRESENT ILLNESS
Narrative:  This is a 57 year old gentleman with a PMH of HTN, HLD, Pulmonary HTN, gout, asthma, NICM ( 2018), acute on chronic HFpEF, JESSICA, obesity, gastric sleeve surgery ( 2016), presenting to the cath holding area this am for a right and LHC with Dr Alexander Infante secondary to FANG & NST + for stress-induced NSVT.    MAL  ASA  GFR  Bleeding Risk Assessment  COVID Swab-  Symptoms:        Angina (Class): 3       Ischemic Symptoms: FANG    Heart Failure: yes HFpEF       Systolic/Diastolic/Combined:        NYHA Class (within 2 weeks):     Assessment of LVEF:       EF: 50-60%       Assessed by: echo       Date: 6/14/22    Prior Cardiac Interventions: Cardiac Cath-2018-normal coronaries       PCI's:        CABG:     Noninvasive Testing:   Stress Test: Date:10/14/22- NST-+ for stress-induced NSVT, moderately decreased LV function, negative for stress-induced ischemia       Protocol: Roque       Duration of Exercise:        Symptoms:        EKG Changes:        DTS:        Myocardial Imaging:        Risk Assessment:     Echo: mildly decreased global LV systolic function, NICM, mildly dilated RA and LA Small patent goodwin ovale    Antianginal Therapies:        Beta Blockers:  coreg       Calcium Channel Blockers: amlodopine       Long Acting Nitrates:        Ranexa:     Associated Risk Factors:        Cerebrovascular Disease: N/A       Chronic Lung Disease: N/A       Peripheral Arterial Disease: N/A       Chronic Kidney Disease (if yes, what is GFR): N/A       Uncontrolled Diabetes (if yes, what is HgbA1C or FBS): N/A       Poorly Controlled Hypertension (if yes, what is SBP): N/A       Morbid Obesity (if yes, what is BMI): N/A       History of Recent Ventricular Arrhythmia: N/A       Inability to Ambulate Safely: N/A       Need for Therapeutic Anticoagulation: N/A       Antiplatelet or Contrast Allergy: N/A Narrative:  This is a 57 year old gentleman with a PMH of HTN, HLD, Pulmonary HTN, gout, asthma, NICM ( 2018), acute on chronic HFpEF, JESSICA, obesity, gastric sleeve surgery ( 2016), presenting to the cath holding area this am for a right and LHC with Dr Alexander Infante secondary to FANG & NST + for stress-induced NSVT.    MAL  ASA  GFR  Bleeding Risk Assessment  COVID Swab-Negative 10/22/22  Symptoms:        Angina (Class): 3       Ischemic Symptoms: FANG    Heart Failure: yes HFpEF       Systolic/Diastolic/Combined:        NYHA Class (within 2 weeks):     Assessment of LVEF:       EF: 50-60%       Assessed by: echo       Date: 6/14/22    Prior Cardiac Interventions: Cardiac Cath-2018-normal coronaries       PCI's:        CABG:     Noninvasive Testing:   Stress Test: Date:10/14/22- NST-+ for stress-induced NSVT, moderately decreased LV function, negative for stress-induced ischemia       Protocol: Roque       Duration of Exercise:        Symptoms:        EKG Changes:        DTS:        Myocardial Imaging:        Risk Assessment:     Echo: mildly decreased global LV systolic function, NICM, mildly dilated RA and LA Small patent goodwin ovale    Antianginal Therapies:        Beta Blockers:  coreg       Calcium Channel Blockers: amlodopine       Long Acting Nitrates:        Ranexa:     Associated Risk Factors:        Cerebrovascular Disease: N/A       Chronic Lung Disease: N/A       Peripheral Arterial Disease: N/A       Chronic Kidney Disease (if yes, what is GFR): N/A       Uncontrolled Diabetes (if yes, what is HgbA1C or FBS): N/A       Poorly Controlled Hypertension (if yes, what is SBP): N/A       Morbid Obesity (if yes, what is BMI): N/A       History of Recent Ventricular Arrhythmia: N/A       Inability to Ambulate Safely: N/A       Need for Therapeutic Anticoagulation: N/A       Antiplatelet or Contrast Allergy: N/A Narrative:  This is a 57 year old gentleman with a PMH of HTN, HLD, Pulmonary HTN, gout, asthma, NICM ( 2018), acute on chronic HFpEF, JESSICA, obesity, gastric sleeve surgery ( 2016), presenting to the cath holding area this am for a right and LHC with Dr Alexander Infante secondary to FANG & Abnormal NST + for stress-induced NSVT.& small zone of apical ischemia    MAL  ASA  GFR  Bleeding Risk Assessment-  COVID Swab-Negative 10/22/22  Symptoms:        Angina (Class): 3       Ischemic Symptoms: FANG    Heart Failure: yes HFpEF       Systolic/Diastolic/Combined:        NYHA Class (within 2 weeks):     Assessment of LVEF:       EF: 50-60%       Assessed by: echo       Date: 6/14/22    Prior Cardiac Interventions: Cardiac Cath-2018-normal coronaries       PCI's:        CABG:     Noninvasive Testing:   Stress Test: Date:10/14/22- NST-+ for stress-induced NSVT, moderately decreased LV function, small zone of apical ischemia         Protocol: Roque       Duration of Exercise:        Symptoms:        EKG Changes:        DTS:        Myocardial Imaging:        Risk Assessment:     Echo: mildly decreased global LV systolic function, NICM, mildly dilated RA and LA Small patent goodwin ovale    Antianginal Therapies:        Beta Blockers:  coreg       Calcium Channel Blockers: amlodopine       Long Acting Nitrates:        Ranexa:     Associated Risk Factors:        Cerebrovascular Disease: N/A       Chronic Lung Disease: N/A       Peripheral Arterial Disease: N/A       Chronic Kidney Disease (if yes, what is GFR): N/A       Uncontrolled Diabetes (if yes, what is HgbA1C or FBS): N/A       Poorly Controlled Hypertension (if yes, what is SBP): N/A       Morbid Obesity (if yes, what is BMI): N/A       History of Recent Ventricular Arrhythmia: N/A       Inability to Ambulate Safely: N/A       Need for Therapeutic Anticoagulation: N/A       Antiplatelet or Contrast Allergy: N/A Narrative:  This is a 57 year old gentleman with a PMH of HTN, HLD, Pulmonary HTN, gout, asthma, NICM ( 2018), acute on chronic HFpEF, JESSICA, obesity, gastric sleeve surgery ( 2016), presenting to the cath holding area this am for a right and LHC with Dr Alexander Infante secondary to FANG & Abnormal NST + for stress-induced NSVT.& small zone of apical ischemia    MAL-2  ASA-2  GFR  Bleeding Risk Assessment-  COVID Swab-Negative 10/22/22  Symptoms:        Angina (Class): 4       Ischemic Symptoms: SOB @ night while resting/ FANG    Heart Failure: yes HFpEF       Systolic/Diastolic/Combined:        NYHA Class (within 2 weeks):     Assessment of LVEF:       EF: 50-60%       Assessed by: echo       Date: 6/14/22    Prior Cardiac Interventions: Cardiac Cath-2018-normal coronaries       PCI's:        CABG:     Noninvasive Testing:   Stress Test: Date:10/14/22- NST-+ for stress-induced NSVT, moderately decreased LV function, small zone of apical ischemia         Protocol: Roque       Duration of Exercise:        Symptoms:        EKG Changes:        DTS:        Myocardial Imaging:        Risk Assessment:     Echo: mildly decreased global LV systolic function, NICM, mildly dilated RA and LA Small patent goodwin ovale    Antianginal Therapies:        Beta Blockers:  coreg       Calcium Channel Blockers: amlodopine       Long Acting Nitrates:        Ranexa:     Associated Risk Factors:        Cerebrovascular Disease: N/A       Chronic Lung Disease: N/A       Peripheral Arterial Disease: N/A       Chronic Kidney Disease (if yes, what is GFR): N/A       Uncontrolled Diabetes (if yes, what is HgbA1C or FBS): N/A       Poorly Controlled Hypertension (if yes, what is SBP): N/A       Morbid Obesity (if yes, what is BMI): N/A       History of Recent Ventricular Arrhythmia: N/A       Inability to Ambulate Safely: N/A       Need for Therapeutic Anticoagulation: N/A       Antiplatelet or Contrast Allergy: N/A Narrative:  This is a 57 year old gentleman with a PMH of HTN, HLD, Pulmonary HTN, gout, asthma, NICM ( 2018), acute on chronic HFpEF, JESSICA, obesity, gastric sleeve surgery ( 2016), presenting to the cath holding area this am for a right and LHC with Dr Alexander Infante secondary to FANG & Abnormal NST + for stress-induced NSVT.& small zone of apical ischemia    MAL-2  ASA-2  GFR-61  Creat-1.36  Bleeding Risk Assessment-0.7%  COVID Swab-Negative 10/22/22  Symptoms:        Angina (Class): 4       Ischemic Symptoms: SOB @ night while resting/ FANG    Heart Failure: yes HFpEF       Systolic/Diastolic/Combined:        NYHA Class (within 2 weeks):     Assessment of LVEF:       EF: 50-60%       Assessed by: echo       Date: 6/14/22    Prior Cardiac Interventions: Cardiac Cath-2018-normal coronaries       PCI's:        CABG:     Noninvasive Testing:   Stress Test: Date:10/14/22- NST-+ for stress-induced NSVT, moderately decreased LV function, small zone of apical ischemia         Protocol: Roque       Duration of Exercise:        Symptoms:        EKG Changes:        DTS:        Myocardial Imaging:        Risk Assessment:     Echo: mildly decreased global LV systolic function, NICM, mildly dilated RA and LA Small patent goodwin ovale    Antianginal Therapies:        Beta Blockers:  coreg       Calcium Channel Blockers: amlodopine       Long Acting Nitrates:        Ranexa:     Associated Risk Factors:        Cerebrovascular Disease: N/A       Chronic Lung Disease: N/A       Peripheral Arterial Disease: N/A       Chronic Kidney Disease (if yes, what is GFR): N/A       Uncontrolled Diabetes (if yes, what is HgbA1C or FBS): N/A       Poorly Controlled Hypertension (if yes, what is SBP): N/A       Morbid Obesity (if yes, what is BMI): N/A       History of Recent Ventricular Arrhythmia: N/A       Inability to Ambulate Safely: N/A       Need for Therapeutic Anticoagulation: N/A       Antiplatelet or Contrast Allergy: N/A

## 2022-10-25 ENCOUNTER — OUTPATIENT (OUTPATIENT)
Dept: OUTPATIENT SERVICES | Facility: HOSPITAL | Age: 57
LOS: 1 days | End: 2022-10-25
Payer: MEDICARE

## 2022-10-25 ENCOUNTER — TRANSCRIPTION ENCOUNTER (OUTPATIENT)
Age: 57
End: 2022-10-25

## 2022-10-25 VITALS
RESPIRATION RATE: 18 BRPM | OXYGEN SATURATION: 96 % | HEART RATE: 76 BPM | SYSTOLIC BLOOD PRESSURE: 149 MMHG | DIASTOLIC BLOOD PRESSURE: 78 MMHG

## 2022-10-25 DIAGNOSIS — R07.9 CHEST PAIN, UNSPECIFIED: ICD-10-CM

## 2022-10-25 LAB
ALBUMIN SERPL ELPH-MCNC: 4.4 G/DL — SIGNIFICANT CHANGE UP (ref 3.3–5.2)
ALP SERPL-CCNC: 46 U/L — SIGNIFICANT CHANGE UP (ref 40–120)
ALT FLD-CCNC: 35 U/L — SIGNIFICANT CHANGE UP
ANION GAP SERPL CALC-SCNC: 10 MMOL/L — SIGNIFICANT CHANGE UP (ref 5–17)
AST SERPL-CCNC: 31 U/L — SIGNIFICANT CHANGE UP
BASOPHILS # BLD AUTO: 0.03 K/UL — SIGNIFICANT CHANGE UP (ref 0–0.2)
BASOPHILS NFR BLD AUTO: 0.5 % — SIGNIFICANT CHANGE UP (ref 0–2)
BILIRUB SERPL-MCNC: 0.4 MG/DL — SIGNIFICANT CHANGE UP (ref 0.4–2)
BUN SERPL-MCNC: 20.8 MG/DL — HIGH (ref 8–20)
CALCIUM SERPL-MCNC: 9.7 MG/DL — SIGNIFICANT CHANGE UP (ref 8.4–10.5)
CHLORIDE SERPL-SCNC: 102 MMOL/L — SIGNIFICANT CHANGE UP (ref 96–108)
CO2 SERPL-SCNC: 32 MMOL/L — HIGH (ref 22–29)
CREAT SERPL-MCNC: 1.36 MG/DL — HIGH (ref 0.5–1.3)
EGFR: 61 ML/MIN/1.73M2 — SIGNIFICANT CHANGE UP
EOSINOPHIL # BLD AUTO: 0.15 K/UL — SIGNIFICANT CHANGE UP (ref 0–0.5)
EOSINOPHIL NFR BLD AUTO: 2.6 % — SIGNIFICANT CHANGE UP (ref 0–6)
GLUCOSE SERPL-MCNC: 106 MG/DL — HIGH (ref 70–99)
HCT VFR BLD CALC: 41.7 % — SIGNIFICANT CHANGE UP (ref 39–50)
HGB BLD-MCNC: 13.4 G/DL — SIGNIFICANT CHANGE UP (ref 13–17)
IMM GRANULOCYTES NFR BLD AUTO: 0.5 % — SIGNIFICANT CHANGE UP (ref 0–0.9)
LYMPHOCYTES # BLD AUTO: 1.72 K/UL — SIGNIFICANT CHANGE UP (ref 1–3.3)
LYMPHOCYTES # BLD AUTO: 30.3 % — SIGNIFICANT CHANGE UP (ref 13–44)
MCHC RBC-ENTMCNC: 28.8 PG — SIGNIFICANT CHANGE UP (ref 27–34)
MCHC RBC-ENTMCNC: 32.1 GM/DL — SIGNIFICANT CHANGE UP (ref 32–36)
MCV RBC AUTO: 89.5 FL — SIGNIFICANT CHANGE UP (ref 80–100)
MONOCYTES # BLD AUTO: 0.52 K/UL — SIGNIFICANT CHANGE UP (ref 0–0.9)
MONOCYTES NFR BLD AUTO: 9.2 % — SIGNIFICANT CHANGE UP (ref 2–14)
NEUTROPHILS # BLD AUTO: 3.23 K/UL — SIGNIFICANT CHANGE UP (ref 1.8–7.4)
NEUTROPHILS NFR BLD AUTO: 56.9 % — SIGNIFICANT CHANGE UP (ref 43–77)
NT-PROBNP SERPL-SCNC: 73 PG/ML — SIGNIFICANT CHANGE UP (ref 0–300)
PLATELET # BLD AUTO: 263 K/UL — SIGNIFICANT CHANGE UP (ref 150–400)
POTASSIUM SERPL-MCNC: 3.4 MMOL/L — LOW (ref 3.5–5.3)
POTASSIUM SERPL-SCNC: 3.4 MMOL/L — LOW (ref 3.5–5.3)
PROT SERPL-MCNC: 8 G/DL — SIGNIFICANT CHANGE UP (ref 6.6–8.7)
RBC # BLD: 4.66 M/UL — SIGNIFICANT CHANGE UP (ref 4.2–5.8)
RBC # FLD: 14.5 % — SIGNIFICANT CHANGE UP (ref 10.3–14.5)
SODIUM SERPL-SCNC: 143 MMOL/L — SIGNIFICANT CHANGE UP (ref 135–145)
WBC # BLD: 5.68 K/UL — SIGNIFICANT CHANGE UP (ref 3.8–10.5)
WBC # FLD AUTO: 5.68 K/UL — SIGNIFICANT CHANGE UP (ref 3.8–10.5)

## 2022-10-25 PROCEDURE — 85025 COMPLETE CBC W/AUTO DIFF WBC: CPT

## 2022-10-25 PROCEDURE — 80053 COMPREHEN METABOLIC PANEL: CPT

## 2022-10-25 PROCEDURE — C1760: CPT

## 2022-10-25 PROCEDURE — 93010 ELECTROCARDIOGRAM REPORT: CPT

## 2022-10-25 PROCEDURE — 93005 ELECTROCARDIOGRAM TRACING: CPT

## 2022-10-25 PROCEDURE — C1894: CPT

## 2022-10-25 PROCEDURE — 83880 ASSAY OF NATRIURETIC PEPTIDE: CPT

## 2022-10-25 PROCEDURE — C1769: CPT

## 2022-10-25 PROCEDURE — 36415 COLL VENOUS BLD VENIPUNCTURE: CPT

## 2022-10-25 PROCEDURE — C1889: CPT

## 2022-10-25 PROCEDURE — C1887: CPT

## 2022-10-25 PROCEDURE — 93460 R&L HRT ART/VENTRICLE ANGIO: CPT

## 2022-10-25 RX ORDER — SODIUM CHLORIDE 9 MG/ML
250 INJECTION INTRAMUSCULAR; INTRAVENOUS; SUBCUTANEOUS
Refills: 0 | Status: DISCONTINUED | OUTPATIENT
Start: 2022-10-25 | End: 2022-11-09

## 2022-10-25 RX ORDER — ASPIRIN/CALCIUM CARB/MAGNESIUM 324 MG
81 TABLET ORAL ONCE
Refills: 0 | Status: COMPLETED | OUTPATIENT
Start: 2022-10-25 | End: 2022-10-25

## 2022-10-25 RX ORDER — POTASSIUM CHLORIDE 20 MEQ
40 PACKET (EA) ORAL ONCE
Refills: 0 | Status: COMPLETED | OUTPATIENT
Start: 2022-10-25 | End: 2022-10-25

## 2022-10-25 RX ADMIN — SODIUM CHLORIDE 250 MILLILITER(S): 9 INJECTION INTRAMUSCULAR; INTRAVENOUS; SUBCUTANEOUS at 13:59

## 2022-10-25 RX ADMIN — Medication 40 MILLIEQUIVALENT(S): at 16:05

## 2022-10-25 RX ADMIN — Medication 81 MILLIGRAM(S): at 13:59

## 2022-10-25 NOTE — CONSULT NOTE ADULT - SUBJECTIVE AND OBJECTIVE BOX
Patient is a 57y old  Male who presents with a chief complaint of new onset SOB on exertion.    HPI:  Narrative:  This is a 57 year old gentleman with a PMH of HTN, HLD, Pulmonary HTN, gout, asthma, NICM ( 2018), acute on chronic HFpEF, JESSICA, obesity, gastric sleeve surgery ( 2016), presenting today for new onset FANG & Abnormal NST + for stress-induced NSVT.& small zone of apical ischemia.        PAST MEDICAL & SURGICAL HISTORY:  Asthma  Hypertension  Hyperlipidemia  Sleep Apnea  Heart Failure  Non-ischemic Cardiomyopathy  Obesity   Gastric Sleeve          PREVIOUS DIAGNOSTIC TESTING:      CATHETERIZATION  FINDINGS: 2018, Normal coronary arteries      Allergies  No Known Allergies        MEDICATIONS  (STANDING):  furosemide 20 mg oral tablet: 1 tab(s) orally once a day· 	  carvedilol 6.25 mg oral tablet: 1 tab(s) orally every 12 hours·  lisinopril 10 mg oral tablet: 1 tab(s) orally once a day          FAMILY HISTORY:  Family history of early CAD (Father)    SOCIAL HISTORY:      CIGARETTES:  Never    ALCOHOL:  liquor 2-3x weekly      REVIEW OF SYSTEMS:  CONSTITUTIONAL: No fever, weight loss, or fatigue  EYES: No eye pain, visual disturbances, or discharge  ENMT:  No difficulty hearing, tinnitus, vertigo; No sinus or throat pain  NECK: No pain or stiffness  RESPIRATORY: No cough, wheezing, chills or hemoptysis; + Shortness of Breath  CARDIOVASCULAR: No chest pain, palpitations, passing out, dizziness, or leg swelling  GASTROINTESTINAL: No abdominal or epigastric pain. No nausea, vomiting, or hematemesis; No diarrhea or constipation. No melena or hematochezia.  GENITOURINARY: No dysuria, frequency, hematuria, or incontinence  NEUROLOGICAL: No headaches, memory loss, loss of strength, numbness, or tremors  SKIN: No itching, burning, rashes, or lesions   ENDOCRINE: No heat or cold intolerance; No hair loss  MUSCULOSKELETAL: No joint pain or swelling; No muscle, back, or extremity pain  PSYCHIATRIC: No depression, anxiety, mood swings, or difficulty sleeping  HEME/LYMPH: No easy bruising, or bleeding gums  ALLERY AND IMMUNOLOGIC: No hives or eczema	    Vital Signs Last 24 Hrs  T(C): 36.8 (25 Oct 2022 14:00), Max: 36.8 (25 Oct 2022 14:00)  T(F): 98.2 (25 Oct 2022 14:00), Max: 98.2 (25 Oct 2022 14:00)  HR: 61 (25 Oct 2022 14:00) (61 - 66)  BP: 168/88 (25 Oct 2022 14:00) (168/88 - 168/88)  BP(mean): 114 (24 Oct 2022 18:09) (114 - 114)  RR: 18 (25 Oct 2022 14:00) (18 - 20)  SpO2: 98% (25 Oct 2022 14:00) (98% - 99%)    Parameters below as of 25 Oct 2022 14:00  Patient On (Oxygen Delivery Method): room air        Daily Height in cm: 180.34 (25 Oct 2022 14:00)            PHYSICAL EXAM:  Appearance: Normal, well nourished	  HEENT:   Normal oral mucosa, PERRL, EOMI, sclera non-icteric	  Cardiovascular: Normal S1 S2, No JVD, No cardiac murmurs, No carotid bruits, No peripheral edema  Respiratory: Lungs clear to auscultation	  Psychiatry: A & O x 3, Mood & affect appropriate  Gastrointestinal:  Soft, Non-tender, + BS, no bruits	  Skin: No rashes, No ecchymoses, No cyanosis  Neurologic: Grossly non-focal with full strength in all four extremities  Extremities: Normal range of motion, No clubbing, cyanosis or edema  Vascular: Peripheral pulses palpable 2+ bilaterally      INTERPRETATION OF TELEMETRY: NSR        LABS:                        13.4   5.68  )-----------( 263      ( 25 Oct 2022 13:41 )             41.7     10-25    143  |  102  |  20.8<H>  ----------------------------<  106<H>  3.4<L>   |  32.0<H>  |  1.36<H>    Ca    9.7      25 Oct 2022 13:41    TPro  8.0  /  Alb  4.4  /  TBili  0.4  /  DBili  x   /  AST  31  /  ALT  35  /  AlkPhos  46  10-25          BNPSerum Pro-Brain Natriuretic Peptide: 73 pg/mL (10-25 @ 13:41)        Assessment:  HPI:  This is a 57 year old gentleman with a PMH of HTN, HLD, Pulmonary HTN, gout, asthma, NICM ( 2018), acute on chronic HFpEF, JESSICA, obesity, gastric sleeve surgery ( 2016), presenting today for new onset FANG & Abnormal NST + for stress-induced NSVT.& small zone of apical ischemia.    Due to Pt's significant cardiac history, recent +NST, and current symptoms I am recommending R & LHC for further cardiac evaluation.    Plan:  Right and Left Heart cardiac catheterization and possible percutaneous intervention recommended.  Risks, benefits, and alternatives reviewed.  Risks including but not limited to MI, death, stroke, bleeding, infection, vessel injury, hematoma, renal failure, allergic reaction, urgent open heart surgery, restenosis and stent thrombosis were reviewed.  All questions answered.  Patient is agreeable to proceed.

## 2022-10-25 NOTE — DISCHARGE NOTE NURSING/CASE MANAGEMENT/SOCIAL WORK - PATIENT PORTAL LINK FT
You can access the FollowMyHealth Patient Portal offered by [facility] by registering at the following website: [email or web address]. By joining [email or web address] FollowMyHealth portal, you will also be able to view your health information using other applications (apps) compatible with our system.

## 2022-10-25 NOTE — DISCHARGE NOTE PROVIDER - NSDCMRMEDTOKEN_GEN_ALL_CORE_FT
Adult Aspirin 81 mg oral tablet, chewable: 1 tab(s) orally once a day  allopurinol 300 mg oral tablet: 1 tab(s) orally once a day  amLODIPine 10 mg oral tablet: 1 tab(s) orally once a day  carvedilol 12.5 mg oral tablet: 1 tab(s) orally 2 times a day  ezetimibe 10 mg oral tablet: 1 tab(s) orally once a day  hydroCHLOROthiazide 12.5 mg oral tablet: 1 tab(s) orally once a day  potassium chloride 10 mEq oral tablet, extended release: 1 tab(s) orally once a day  rosuvastatin 40 mg oral tablet: 1 tab(s) orally once a day  torsemide 20 mg oral tablet: 1 tab(s) orally once a day

## 2022-10-25 NOTE — PROGRESS NOTE ADULT - SUBJECTIVE AND OBJECTIVE BOX
Department of Cardiology                                                                  [facility]/[place] [address]                                                            Telephone: [phone]. Fax:[phone]                                                    Post- Procedure Note: Right &  Left Heart Cardiac Catheterization       Narrative:  57y  Male is now s/p right & left heart catheterization via #5Fr RFA ( angioseal) & #7Fr RFV ( mynx)  by Dr Infante-no intervention/ RA: 16/13/12, RV: 45/6/14, PCW: 20/18/14, Aosat-94.5, PA sat: 57.3 LVEDP-22 CO-5.0 CI-1.96     90cc omnipaque utilized,     General: No fatigue, no fevers/chills  Respiratory: No dyspnea, no cough, no wheeze  CV: No chest pain, no palpitations  Abd: No nausea  Neuro: No headache, no dizziness    PAST MEDICAL & SURGICAL HISTORY:  Asthma      Hypertension      No significant past surgical history        Home Medications:  Adult Aspirin 81 mg oral tablet, chewable: 1 tab(s) orally once a day (25 Oct 2022 14:20)  allopurinol 300 mg oral tablet: 1 tab(s) orally once a day (25 Oct 2022 14:20)  amLODIPine 10 mg oral tablet: 1 tab(s) orally once a day (25 Oct 2022 14:20)  carvedilol 12.5 mg oral tablet: 1 tab(s) orally 2 times a day (25 Oct 2022 14:20)  ezetimibe 10 mg oral tablet: 1 tab(s) orally once a day (25 Oct 2022 14:20)  hydroCHLOROthiazide 12.5 mg oral tablet: 1 tab(s) orally once a day (25 Oct 2022 14:20)  potassium chloride 10 mEq oral tablet, extended release: 1 tab(s) orally once a day (25 Oct 2022 14:20)  rosuvastatin 40 mg oral tablet: 1 tab(s) orally once a day (25 Oct 2022 14:20)  torsemide 20 mg oral tablet: 1 tab(s) orally once a day (25 Oct 2022 14:20)        No Known Allergies      Objective:  Vital Signs Last 24 Hrs  T(C): 36.8 (25 Oct 2022 14:00), Max: 36.8 (25 Oct 2022 14:00)  T(F): 98.2 (25 Oct 2022 14:00), Max: 98.2 (25 Oct 2022 14:00)  HR: 61 (25 Oct 2022 14:00) (61 - 66)  BP: 168/88 (25 Oct 2022 14:00) (168/88 - 168/88)  BP(mean): 114 (24 Oct 2022 18:09) (114 - 114)  RR: 18 (25 Oct 2022 14:00) (18 - 20)  SpO2: 98% (25 Oct 2022 14:00) (98% - 99%)    Parameters below as of 25 Oct 2022 14:00  Patient On (Oxygen Delivery Method): room air        GENERAL: Pt lying comfortably, NAD.  ENMT: PERRL, +EOMI.  NECK: soft, Supple, No JVD,   CHEST/LUNG: Clear to auscultatation bilaterally; No wheezing.  HEART: S1S2+, Regular rate and rhythm; No murmurs.  ABDOMEN: Soft, Nontender, Nondistended; Bowel sounds present.  MUSCULOSKELETAL: Normal range of motion.  SKIN: No rashes or lesions.  NEURO: AAOX3, no focal deficits, no motor r sensory loss.  PSYCH: normal mood.  Procedure site: ...#5FrRFA ( angioseal) #7Fr RFV ( mynx)......., no signs of bleeding or hematoma, neurovascular intact   VASCULAR:   Radial +2 R/+2 L  Femoral +2 R/+2 L  PT +2 R/+2 L  DP +2 R/+2 L      Tele-NSR 60s                      13.4   5.68  )-----------( 263      ( 25 Oct 2022 13:41 )             41.7     10-25    143  |  102  |  20.8<H>  ----------------------------<  106<H>  3.4<L>   |  32.0<H>  |  1.36<H>    Ca    9.7      25 Oct 2022 13:41    TPro  8.0  /  Alb  4.4  /  TBili  0.4  /  DBili  x   /  AST  31  /  ALT  35  /  AlkPhos  46  10-25        Patient is a 57y old  Male who presents with a chief complaint of Right and LHC (24 Oct 2022 18:09)    57y  Male is now s/p right & left heart catheterization via #5Fr RFA ( angioseal) & #7Fr RFV ( mynx)  by Dr Infante-no intervention/   RA: 16/13/12, RV: 45/6/14, PCW: 20/18/14, Aosat-94.5, PA sat: 57.3 LVEDP-22 CO-5.0 CI-1.96  -post cardiac cath orders  -right groin precautions/ education  -bedrest x  2hours post procedure  -continue current medical therapy/ GDMT  -follow up outpt in 1- 2 weeks with Cardiologist-Dr Lanza  -Lifestyle modifications discussed to reduce cardiovascular risk factors including weight reduction, smoking cessation, medication compliance, and routine follow up with Cardiologist to track your BMI, cholesterol, and glucose levels.   -Discharge  this evening if Rt groin without any bleeding or hematoma formation @ 8PM

## 2022-10-25 NOTE — DISCHARGE NOTE PROVIDER - NSDCCPCAREPLAN_GEN_ALL_CORE_FT
PRINCIPAL DISCHARGE DIAGNOSIS  Diagnosis: Nonischemic cardiomyopathy  Assessment and Plan of Treatment:

## 2022-10-25 NOTE — ASU DISCHARGE PLAN (ADULT/PEDIATRIC) - NS MD DC FALL RISK RISK
For information on Fall & Injury Prevention, visit: [email or web address] OR  [email or web address] OR  https://www.cdc.gov/steadi/patient.html

## 2022-10-25 NOTE — DISCHARGE NOTE PROVIDER - HOSPITAL COURSE
Patient is a 57y old  Male who presents with a chief complaint of Right and LHC (24 Oct 2022 18:09)    57y  Male is now s/p right & left heart catheterization via #5Fr RFA ( angioseal) & #7Fr RFV ( mynx)  by Dr Infante-no intervention/   RA: 16/13/12, RV: 45/6/14, PCW: 20/18/14, Aosat-94.5, PA sat: 57.3 LVEDP-22 CO-5.0 CI-1.96  -post cardiac cath orders  -right groin precautions/ education  -bedrest x  2hours post procedure  -continue current medical therapy/ GDMT  -follow up outpt in 1- 2 weeks with Cardiologist-Dr Lanza  -Lifestyle modifications discussed to reduce cardiovascular risk factors including weight reduction, smoking cessation, medication compliance, and routine follow up with Cardiologist to track your BMI, cholesterol, and glucose levels.   -Discharge  this evening if Rt groin without any bleeding or hematoma formation @ 8PM

## 2022-10-25 NOTE — DISCHARGE NOTE PROVIDER - NSDCFUSCHEDAPPT_GEN_ALL_CORE_FT
Maciel Shah  [facility]  PULMMED [address]  Scheduled Appointment: 11/29/2022    Marjorie Leger  [facility]  HNT PreAdmits  Scheduled Appointment: 11/30/2022

## 2022-10-25 NOTE — DISCHARGE NOTE PROVIDER - NSDCCPTREATMENT_GEN_ALL_CORE_FT
PRINCIPAL PROCEDURE  Procedure: Left and right heart catheterization  Findings and Treatment: via RFA and RFV no intervention

## 2022-10-25 NOTE — DISCHARGE NOTE PROVIDER - CARE PROVIDER_API CALL
Bravo Bruce)  Cardiovascular Disease; Internal Medicine; Nuclear Cardiology  [address] [place] [address]  Phone: [phone]  Fax: [phone]  Follow Up Time:

## 2022-10-25 NOTE — DISCHARGE NOTE NURSING/CASE MANAGEMENT/SOCIAL WORK - NSDCPEFALRISK_GEN_ALL_CORE
For information on Fall & Injury Prevention, visit: [email or web address] OR  [email or web address] OR  https://www.cdc.gov/steadi/patient.html

## 2022-10-28 NOTE — CONSULT NOTE ADULT - CONSULT REASON
"    Office Note     Name: Liya Bill    : 1988     MRN: [id number]     Chief Complaint  Earache    Subjective     History of Present Illness:  Liya Bill is a 34 y.o. female who presents today for several concerns today.  She states that she has some difficulty hearing out of both ears at times and she feels like to get stopped up but the right ear and particular over the last couple of days has felt like it was stopped up and she did not know if wax was in it or if fluid was behind the eardrum.  She does have allergies.  She wanted to have the ears checked to make sure there was not anything going on with something being stuck in the ear canal.  She is not having any drainage or pain in the ears.  She does have some allergy drainage in her throat.  No fevers.  She feels like her hearing is decreased in the right ear with some muffling of hearing but she thinks it may be fluid.    Review of Systems   Respiratory: Negative for cough, shortness of breath and wheezing.    Cardiovascular: Negative for chest pain and palpitations.   Gastrointestinal: Negative for blood in stool, diarrhea and vomiting.   Genitourinary: Negative for dysuria, flank pain and genital sores.       Objective     Vital Signs  /74   Pulse 76   Temp 97.1 °F (36.2 °C) (Temporal)   Resp 20   Ht 154.7 cm (60.9\")   Wt 64.9 kg (143 lb)   SpO2 98%   BMI 27.11 kg/m²   Estimated body mass index is 27.11 kg/m² as calculated from the following:    Height as of this encounter: 154.7 cm (60.9\").    Weight as of this encounter: 64.9 kg (143 lb).          Physical Exam  HENT:      Head: Normocephalic and atraumatic.      Jaw: There is normal jaw occlusion.      Salivary Glands: Right salivary gland is not diffusely enlarged or tender. Left salivary gland is not diffusely enlarged or tender.      Right Ear: Hearing, tympanic membrane, ear canal and external ear normal.      Left Ear: Hearing, tympanic membrane, ear canal and external ear " normal.      Nose: Nose normal.      Mouth/Throat:      Lips: Pink.      Mouth: Mucous membranes are moist.      Tongue: No lesions.      Palate: No lesions.      Pharynx: Oropharynx is clear.   Neck:      Vascular: No carotid bruit.   Cardiovascular:      Rate and Rhythm: Normal rate and regular rhythm.      Heart sounds: Normal heart sounds. No murmur heard.    No gallop.   Pulmonary:      Effort: Pulmonary effort is normal. No respiratory distress.      Breath sounds: No stridor. No wheezing, rhonchi or rales.   Musculoskeletal:      Cervical back: Normal range of motion and neck supple.      Right lower leg: No edema.      Left lower leg: No edema.   Lymphadenopathy:      Cervical: No cervical adenopathy.   Neurological:      Mental Status: She is alert.          Lab Review:           Assessment and Plan     Diagnoses and all orders for this visit:    1. Dysfunction of right eustachian tube (Primary)  -     fluticasone (Flonase) 50 MCG/ACT nasal spray; 2 sprays into the nostril(s) as directed by provider Daily.  Dispense: 18.2 mL; Refill: 4    2. Decreased hearing of right ear    3. Allergic rhinitis, unspecified seasonality, unspecified trigger        Follow Up  No follow-ups on file.    Marilee Bland DO   Left Ankle pain

## 2022-11-16 ENCOUNTER — NON-APPOINTMENT (OUTPATIENT)
Age: 57
End: 2022-11-16

## 2022-11-30 ENCOUNTER — RESULT REVIEW (OUTPATIENT)
Age: 57
End: 2022-11-30

## 2022-11-30 ENCOUNTER — OUTPATIENT (OUTPATIENT)
Dept: OUTPATIENT SERVICES | Facility: HOSPITAL | Age: 57
LOS: 1 days | Discharge: HOME | End: 2022-11-30
Payer: MEDICARE

## 2022-11-30 VITALS
TEMPERATURE: 98 F | HEART RATE: 60 BPM | DIASTOLIC BLOOD PRESSURE: 85 MMHG | SYSTOLIC BLOOD PRESSURE: 137 MMHG | RESPIRATION RATE: 21 BRPM | HEIGHT: 71 IN | WEIGHT: 315 LBS | OXYGEN SATURATION: 98 %

## 2022-11-30 DIAGNOSIS — Z98.890 OTHER SPECIFIED POSTPROCEDURAL STATES: Chronic | ICD-10-CM

## 2022-11-30 DIAGNOSIS — Z12.11 ENCOUNTER FOR SCREENING FOR MALIGNANT NEOPLASM OF COLON: ICD-10-CM

## 2022-11-30 PROCEDURE — C1889: CPT

## 2022-11-30 PROCEDURE — 88305 TISSUE EXAM BY PATHOLOGIST: CPT

## 2022-11-30 PROCEDURE — 88305 TISSUE EXAM BY PATHOLOGIST: CPT | Mod: 26

## 2022-11-30 NOTE — ASU PATIENT PROFILE, ADULT - NSICDXPASTMEDICALHX_GEN_ALL_CORE_FT
PAST MEDICAL HISTORY:  Asthma     Hypertension      PAST MEDICAL HISTORY:  Asthma     Hyperlipemia     Hypertension

## 2022-11-30 NOTE — ASU PATIENT PROFILE, ADULT - NSICDXPASTSURGICALHX_GEN_ALL_CORE_FT
PAST SURGICAL HISTORY:  No significant past surgical history      PAST SURGICAL HISTORY:  H/O cardiac catheterization

## 2022-12-02 DIAGNOSIS — I27.20 PULMONARY HYPERTENSION, UNSPECIFIED: ICD-10-CM

## 2022-12-02 DIAGNOSIS — Z12.11 ENCOUNTER FOR SCREENING FOR MALIGNANT NEOPLASM OF COLON: ICD-10-CM

## 2022-12-02 DIAGNOSIS — E11.22 TYPE 2 DIABETES MELLITUS WITH DIABETIC CHRONIC KIDNEY DISEASE: ICD-10-CM

## 2022-12-02 DIAGNOSIS — I47.1 SUPRAVENTRICULAR TACHYCARDIA: ICD-10-CM

## 2022-12-02 DIAGNOSIS — I50.33 ACUTE ON CHRONIC DIASTOLIC (CONGESTIVE) HEART FAILURE: ICD-10-CM

## 2022-12-02 DIAGNOSIS — Z79.82 LONG TERM (CURRENT) USE OF ASPIRIN: ICD-10-CM

## 2022-12-02 DIAGNOSIS — I25.10 ATHEROSCLEROTIC HEART DISEASE OF NATIVE CORONARY ARTERY WITHOUT ANGINA PECTORIS: ICD-10-CM

## 2022-12-02 DIAGNOSIS — Z95.5 PRESENCE OF CORONARY ANGIOPLASTY IMPLANT AND GRAFT: ICD-10-CM

## 2022-12-02 DIAGNOSIS — E78.5 HYPERLIPIDEMIA, UNSPECIFIED: ICD-10-CM

## 2022-12-02 DIAGNOSIS — G47.33 OBSTRUCTIVE SLEEP APNEA (ADULT) (PEDIATRIC): ICD-10-CM

## 2022-12-02 DIAGNOSIS — N18.9 CHRONIC KIDNEY DISEASE, UNSPECIFIED: ICD-10-CM

## 2022-12-02 DIAGNOSIS — I42.8 OTHER CARDIOMYOPATHIES: ICD-10-CM

## 2022-12-02 DIAGNOSIS — I47.29 OTHER VENTRICULAR TACHYCARDIA: ICD-10-CM

## 2022-12-02 DIAGNOSIS — Z98.84 BARIATRIC SURGERY STATUS: ICD-10-CM

## 2022-12-02 DIAGNOSIS — K64.9 UNSPECIFIED HEMORRHOIDS: ICD-10-CM

## 2022-12-02 DIAGNOSIS — E66.01 MORBID (SEVERE) OBESITY DUE TO EXCESS CALORIES: ICD-10-CM

## 2022-12-02 DIAGNOSIS — M10.9 GOUT, UNSPECIFIED: ICD-10-CM

## 2022-12-02 DIAGNOSIS — D12.3 BENIGN NEOPLASM OF TRANSVERSE COLON: ICD-10-CM

## 2022-12-02 DIAGNOSIS — I13.0 HYPERTENSIVE HEART AND CHRONIC KIDNEY DISEASE WITH HEART FAILURE AND STAGE 1 THROUGH STAGE 4 CHRONIC KIDNEY DISEASE, OR UNSPECIFIED CHRONIC KIDNEY DISEASE: ICD-10-CM

## 2022-12-02 DIAGNOSIS — Q21.12 PATENT FORAMEN OVALE: ICD-10-CM

## 2022-12-02 DIAGNOSIS — J45.909 UNSPECIFIED ASTHMA, UNCOMPLICATED: ICD-10-CM

## 2022-12-02 DIAGNOSIS — Z86.16 PERSONAL HISTORY OF COVID-19: ICD-10-CM

## 2022-12-02 DIAGNOSIS — Z79.52 LONG TERM (CURRENT) USE OF SYSTEMIC STEROIDS: ICD-10-CM

## 2022-12-02 DIAGNOSIS — K57.30 DIVERTICULOSIS OF LARGE INTESTINE WITHOUT PERFORATION OR ABSCESS WITHOUT BLEEDING: ICD-10-CM

## 2023-02-27 PROBLEM — E78.5 HYPERLIPIDEMIA, UNSPECIFIED: Chronic | Status: ACTIVE | Noted: 2022-11-30

## 2023-03-08 ENCOUNTER — APPOINTMENT (OUTPATIENT)
Dept: GASTROENTEROLOGY | Facility: CLINIC | Age: 58
End: 2023-03-08
Payer: MEDICARE

## 2023-03-08 VITALS
WEIGHT: 315 LBS | BODY MASS INDEX: 44.1 KG/M2 | HEIGHT: 71 IN | HEART RATE: 70 BPM | SYSTOLIC BLOOD PRESSURE: 136 MMHG | DIASTOLIC BLOOD PRESSURE: 91 MMHG

## 2023-03-08 DIAGNOSIS — K63.5 POLYP OF COLON: ICD-10-CM

## 2023-03-08 PROCEDURE — 99204 OFFICE O/P NEW MOD 45 MIN: CPT

## 2023-03-13 PROBLEM — K63.5 COLON POLYP: Status: ACTIVE | Noted: 2023-03-08

## 2023-03-13 NOTE — HISTORY OF PRESENT ILLNESS
[FreeTextEntry1] : 57 year old man referred by Dr. Leger for endoscopic mucosal resection. \par \par Patient is without acute complaints. No abdominal pain. No weight loss. Good appetite. No post prandial issues. No  overt signs of Gi bleeding like hematemesis, melena, hematochezia. Had a screening colonoscopy by Dr. Leger and found to have large polyp, one adenoma with high grade dysplasia. \par \par

## 2023-03-13 NOTE — PHYSICAL EXAM
[Alert] : alert [Normal Voice/Communication] : normal voice/communication [Healthy Appearing] : healthy appearing [No Acute Distress] : no acute distress [Sclera] : the sclera and conjunctiva were normal [Hearing Threshold Finger Rub Not [place]] : hearing was normal [Normal Appearance] : the appearance of the neck was normal [No Neck Mass] : no neck mass was observed [Abdomen Tenderness] : non-tender [Abdomen Soft] : soft [Abnormal Walk] : normal gait [No Clubbing, Cyanosis] : no clubbing or cyanosis of the fingernails [Normal Color / Pigmentation] : normal skin color and pigmentation [] : no rash [Skin Lesions] : no skin lesions [No Focal Deficits] : no focal deficits [Motor Exam] : the motor exam was normal [Oriented To Time, Place, And Person] : oriented to person, place, and time [de-identified] : obese

## 2023-03-13 NOTE — ASSESSMENT
[FreeTextEntry1] : 57 year old man referred by Dr. Leger for endoscopic mucosal resection. \par \par Will plan for colonoscopy and endoscopic mucosal resection of large polyp in colon that had dysplasia. \par \par Discussed r/b/a at length, most importantly bleeding (delayed/immediate) and perforation (delayed/immediate). \par \par Will order a miralax prep. \par \par Cc: Dr. Marjorie Leger

## 2023-03-21 ENCOUNTER — APPOINTMENT (OUTPATIENT)
Dept: PULMONOLOGY | Facility: CLINIC | Age: 58
End: 2023-03-21
Payer: MEDICARE

## 2023-03-21 VITALS
HEART RATE: 60 BPM | SYSTOLIC BLOOD PRESSURE: 132 MMHG | WEIGHT: 315 LBS | HEIGHT: 71 IN | DIASTOLIC BLOOD PRESSURE: 84 MMHG | BODY MASS INDEX: 44.1 KG/M2 | OXYGEN SATURATION: 97 %

## 2023-03-21 PROCEDURE — 99214 OFFICE O/P EST MOD 30 MIN: CPT

## 2023-03-21 NOTE — HISTORY OF PRESENT ILLNESS
[Follow-Up] : follow-up of [Currently Experiencing] : The patient is currently experiencing symptoms. [Inhaled Albuterol] : inhaled albuterol [Good Compliance] : good compliance with treatment [Good Tolerance] : good tolerance of treatment [Poor Compliance] : poor compliance with treatment [Poor Tolerance] : poor tolerance of treatment [Poor Symptom Control] : poor symptom control [Follow-Up - Routine Clinic] : a routine clinic follow-up of [Excess Weight] : excess weight [Inability to Lose Weight] : inability to lose weight [Dyspnea] : dyspnea [Back Pain] : back pain [Low Calorie Diet] : low calorie diet [Exercise Regimen] : exercise regimen [Fair Compliance] : fair compliance with treatment [Fair Tolerance] : fair tolerance of treatment [Fair Symptom Control] : fair symptom control [Sleep Apnea] : sleep apnea [High] : high [Low Calorie] : low calorie [On ___] : performed on [unfilled] [Patient] : the patient [To Assess ___] : to assess [unfilled] [None] : no new symptoms reported [TextBox_4] : Patient reports occasional wheeze and SOB especially with exertion. He reports improvement with Advair which he is now only using as needed.\par He was recently seen in [facility] for SOB but w/u was negative as per the patient and he was d/c'd without therapy. SOB may be related to morbid obesity.\par Pt was lost to f/u for 3 years\par Pt with CXR with ? RLL infiltrate vs atelectasis with elevated R hemidiaphragm.\par He was previously hospitalized for SOB and was thought to have CHF. He reports multiple CHF exac and feels that this is caused by his CPAP therapy though I explained to him that CPAP should help CHF and his underlying cardiac function so refuses to use any more. He has an oral appliance which he apparently uses on occasion.\par He is very frustrated with his condition which I explained would like improve significantly with weight loss given his BMI>46. \par He also has restriction on PFTs which also may be weight related. He had lost weight after gastric sleeve surgery in 2016 but has gained most of it back. Prior imaging studies revealed only mild basilar atelectasis and possible pulm edema. \par W/u for elevated hemidiaphragm revealed impaired motion but no paralysis of the hemidiaphragm.\par Pt expresses he has no interested in further CPAP therapy for JESSICA and no longer wants to f/u. [de-identified] : intermittent wheeze, SOBOE, and intermittent chest tightness [Morning Headaches] : no morning headaches [Shortness of Breath] : no shortness of breath [de-identified] : AutoCPAP [Diabetes] : no diabetes [Hypertension] : no hypertension [FreeTextEntry9] : CXR [FreeTextEntry8] : Mildly elevated R hemidiaphragm with ? infiltrate or compressive atelectasis

## 2023-03-21 NOTE — CONSULT LETTER
[Dear  ___] : Dear  [unfilled], [Consult Letter:] : I had the pleasure of evaluating your patient, [unfilled]. [Please see my note below.] : Please see my note below. [Consult Closing:] : Thank you very much for allowing me to participate in the care of this patient.  If you have any questions, please do not hesitate to contact me. [Sincerely,] : Sincerely, [DrMary  ___] : Dr. MONROE [FreeTextEntry3] : Maciel Shah MD, FCCP, D. ABSM\par Pulmonary and Sleep Medicine\par [facility] [place]

## 2023-03-21 NOTE — PHYSICAL EXAM
[No Acute Distress] : no acute distress [Well Nourished] : well nourished [Well Developed] : well developed [Normal Appearance] : normal appearance [Normal Rate/Rhythm] : normal rate/rhythm [Normal S1, S2] : normal s1, s2 [No Murmurs] : no murmurs [No Resp Distress] : no resp distress [No Acc Muscle Use] : no acc muscle use [Normal Rhythm and Effort] : normal rhythm and effort [Clear to Auscultation Bilaterally] : clear to auscultation bilaterally [No Abnormalities] : no abnormalities [Benign] : benign [Not Tender] : not tender [Soft] : soft [Normal Gait] : normal gait [No Clubbing] : no clubbing [No Focal Deficits] : no focal deficits [No Edema] : no edema [Oriented x3] : oriented x3 [TextBox_2] : Morbidly obese

## 2023-03-28 ENCOUNTER — OUTPATIENT (OUTPATIENT)
Dept: OUTPATIENT SERVICES | Facility: HOSPITAL | Age: 58
LOS: 1 days | End: 2023-03-28
Payer: MEDICARE

## 2023-03-28 VITALS
DIASTOLIC BLOOD PRESSURE: 78 MMHG | RESPIRATION RATE: 14 BRPM | HEART RATE: 53 BPM | TEMPERATURE: 98 F | WEIGHT: 315 LBS | OXYGEN SATURATION: 100 % | SYSTOLIC BLOOD PRESSURE: 138 MMHG | HEIGHT: 71 IN

## 2023-03-28 DIAGNOSIS — Z98.890 OTHER SPECIFIED POSTPROCEDURAL STATES: Chronic | ICD-10-CM

## 2023-03-28 DIAGNOSIS — Z90.3 ACQUIRED ABSENCE OF STOMACH [PART OF]: Chronic | ICD-10-CM

## 2023-03-28 DIAGNOSIS — K63.5 POLYP OF COLON: ICD-10-CM

## 2023-03-28 LAB
ALBUMIN SERPL ELPH-MCNC: 3.9 G/DL — SIGNIFICANT CHANGE UP (ref 3.3–5)
ALP SERPL-CCNC: 36 U/L — LOW (ref 40–120)
ALT FLD-CCNC: 37 U/L — SIGNIFICANT CHANGE UP (ref 12–78)
ANION GAP SERPL CALC-SCNC: 3 MMOL/L — LOW (ref 5–17)
AST SERPL-CCNC: 25 U/L — SIGNIFICANT CHANGE UP (ref 15–37)
BILIRUB SERPL-MCNC: 0.4 MG/DL — SIGNIFICANT CHANGE UP (ref 0.2–1.2)
BUN SERPL-MCNC: 15 MG/DL — SIGNIFICANT CHANGE UP (ref 7–23)
CALCIUM SERPL-MCNC: 9 MG/DL — SIGNIFICANT CHANGE UP (ref 8.5–10.1)
CHLORIDE SERPL-SCNC: 108 MMOL/L — SIGNIFICANT CHANGE UP (ref 96–108)
CO2 SERPL-SCNC: 32 MMOL/L — HIGH (ref 22–31)
CREAT SERPL-MCNC: 1.24 MG/DL — SIGNIFICANT CHANGE UP (ref 0.5–1.3)
EGFR: 68 ML/MIN/1.73M2 — SIGNIFICANT CHANGE UP
GLUCOSE SERPL-MCNC: 94 MG/DL — SIGNIFICANT CHANGE UP (ref 70–99)
POTASSIUM SERPL-MCNC: 3.2 MMOL/L — LOW (ref 3.5–5.3)
POTASSIUM SERPL-SCNC: 3.2 MMOL/L — LOW (ref 3.5–5.3)
PROT SERPL-MCNC: 7.9 GM/DL — SIGNIFICANT CHANGE UP (ref 6–8.3)
SARS-COV-2 RNA SPEC QL NAA+PROBE: SIGNIFICANT CHANGE UP
SODIUM SERPL-SCNC: 143 MMOL/L — SIGNIFICANT CHANGE UP (ref 135–145)

## 2023-03-28 PROCEDURE — 80053 COMPREHEN METABOLIC PANEL: CPT

## 2023-03-28 PROCEDURE — 93005 ELECTROCARDIOGRAM TRACING: CPT

## 2023-03-28 PROCEDURE — 85025 COMPLETE CBC W/AUTO DIFF WBC: CPT

## 2023-03-28 PROCEDURE — 87635 SARS-COV-2 COVID-19 AMP PRB: CPT

## 2023-03-28 PROCEDURE — 93010 ELECTROCARDIOGRAM REPORT: CPT

## 2023-03-28 NOTE — H&P PST ADULT - MUSCULOSKELETAL
normal/ROM intact/normal gait/strength 5/5 bilateral upper extremities/strength 5/5 bilateral lower extremities negative ROM intact/normal gait/strength 5/5 bilateral upper extremities/strength 5/5 bilateral lower extremities

## 2023-03-28 NOTE — H&P PST ADULT - HISTORY OF PRESENT ILLNESS
58y/o male scheduled for colonoscopy on 3/30/2023.  Pt states, "HTN, HLD, Pulmonary HTN, gout, chf, NICM ( 2018), JESSICA, obesity, gastric sleeve surgery ( 2016), had a colonoscopy 10/2022 having follow removal of polyp.  Denies rectal bleeding pain."     56y/o male scheduled for colonoscopy on 3/30/2023.  Pt states, "HTN, HLD, Pulmonary HTN, gout, chf,, JESSICA, obesity, gastric sleeve surgery ( 2016), had a colonoscopy 10/2022 having follow removal of polyp.  Denies rectal bleeding pain."

## 2023-03-28 NOTE — H&P PST ADULT - GASTROINTESTINAL
details… soft/nontender/normal active bowel sounds/no guarding/no rigidity/no organomegaly/no palpable zonia/no masses palpable

## 2023-03-28 NOTE — H&P PST ADULT - NSALCOHOLLASTUSE_GEN_A_CORE_DT
Psychoeducation Group Note    PATIENT'S NAME: Ami Burkett  MRN:   [id number]  :   1989  ACCT. NUMBER: [id number]  DATE OF SERVICE: 22  START TIME:  1:00 PM  END TIME:  1:50 PM  FACILITATOR: Mouna Eldridge OTR/L  TOPIC: [facility] Mouna Eldridge OTR/L       24-Mar-2023

## 2023-03-28 NOTE — H&P PST ADULT - CARDIOVASCULAR
details… normal/regular rate and rhythm/S1 S2 present/no gallops/no rub/no murmur regular rate and rhythm/S1 S2 present/no gallops/no rub/no murmur/no JVD/no pedal edema

## 2023-03-28 NOTE — H&P PST ADULT - PROBLEM SELECTOR PLAN 1
Pt scheduled for colonoscopy on 3/30/2023.  labs done results pending, ekg done.  Preop covid testing done.  Preop teaching done, pt able to verbalize understanding.  medications as per endo. Pt scheduled for colonoscopy on 3/30/2023.  labs done results pending, ekg done.  Preop covid testing done.  Preop teaching done, pt able to verbalize understanding.  medications as per endo.  HR laying down at rest 53, sitting 76

## 2023-03-28 NOTE — H&P PST ADULT - NSICDXPASTMEDICALHX_GEN_ALL_CORE_FT
PAST MEDICAL HISTORY:  Asthma     Hypertension      PAST MEDICAL HISTORY:  Asthma     Gout     H/O CHF     H/O pulmonary hypertension     Hyperlipemia     Hypertension     Moderate obstructive sleep apnea

## 2023-03-28 NOTE — H&P PST ADULT - NSICDXPASTSURGICALHX_GEN_ALL_CORE_FT
PAST SURGICAL HISTORY:  No significant past surgical history      PAST SURGICAL HISTORY:  H/O cardiac catheterization     H/O gastric sleeve     S/P colonoscopy

## 2023-03-29 DIAGNOSIS — K63.5 POLYP OF COLON: ICD-10-CM

## 2023-03-29 LAB
BASOPHILS # BLD AUTO: 0.04 K/UL — SIGNIFICANT CHANGE UP (ref 0–0.2)
BASOPHILS NFR BLD AUTO: 0.8 % — SIGNIFICANT CHANGE UP (ref 0–2)
EOSINOPHIL # BLD AUTO: 0.18 K/UL — SIGNIFICANT CHANGE UP (ref 0–0.5)
EOSINOPHIL NFR BLD AUTO: 3.5 % — SIGNIFICANT CHANGE UP (ref 0–6)
HCT VFR BLD CALC: 43.5 % — SIGNIFICANT CHANGE UP (ref 39–50)
HGB BLD-MCNC: 13.4 G/DL — SIGNIFICANT CHANGE UP (ref 13–17)
IMM GRANULOCYTES NFR BLD AUTO: 0.2 % — SIGNIFICANT CHANGE UP (ref 0–0.9)
LYMPHOCYTES # BLD AUTO: 1.62 K/UL — SIGNIFICANT CHANGE UP (ref 1–3.3)
LYMPHOCYTES # BLD AUTO: 31.5 % — SIGNIFICANT CHANGE UP (ref 13–44)
MCHC RBC-ENTMCNC: 27.8 PG — SIGNIFICANT CHANGE UP (ref 27–34)
MCHC RBC-ENTMCNC: 30.8 GM/DL — LOW (ref 32–36)
MCV RBC AUTO: 90.2 FL — SIGNIFICANT CHANGE UP (ref 80–100)
MONOCYTES # BLD AUTO: 0.4 K/UL — SIGNIFICANT CHANGE UP (ref 0–0.9)
MONOCYTES NFR BLD AUTO: 7.8 % — SIGNIFICANT CHANGE UP (ref 2–14)
NEUTROPHILS # BLD AUTO: 2.9 K/UL — SIGNIFICANT CHANGE UP (ref 1.8–7.4)
NEUTROPHILS NFR BLD AUTO: 56.2 % — SIGNIFICANT CHANGE UP (ref 43–77)
PLATELET # BLD AUTO: 272 K/UL — SIGNIFICANT CHANGE UP (ref 150–400)
RBC # BLD: 4.82 M/UL — SIGNIFICANT CHANGE UP (ref 4.2–5.8)
RBC # FLD: 16 % — HIGH (ref 10.3–14.5)
WBC # BLD: 5.15 K/UL — SIGNIFICANT CHANGE UP (ref 3.8–10.5)
WBC # FLD AUTO: 5.15 K/UL — SIGNIFICANT CHANGE UP (ref 3.8–10.5)

## 2023-03-30 ENCOUNTER — APPOINTMENT (OUTPATIENT)
Dept: GASTROENTEROLOGY | Facility: HOSPITAL | Age: 58
End: 2023-03-30
Payer: MEDICARE

## 2023-03-30 ENCOUNTER — RESULT REVIEW (OUTPATIENT)
Age: 58
End: 2023-03-30

## 2023-03-30 ENCOUNTER — OUTPATIENT (OUTPATIENT)
Dept: INPATIENT UNIT | Facility: HOSPITAL | Age: 58
LOS: 1 days | Discharge: HOME | End: 2023-03-30
Payer: MEDICARE

## 2023-03-30 ENCOUNTER — TRANSCRIPTION ENCOUNTER (OUTPATIENT)
Age: 58
End: 2023-03-30

## 2023-03-30 VITALS
SYSTOLIC BLOOD PRESSURE: 137 MMHG | TEMPERATURE: 97 F | DIASTOLIC BLOOD PRESSURE: 70 MMHG | HEART RATE: 65 BPM | RESPIRATION RATE: 16 BRPM | OXYGEN SATURATION: 98 %

## 2023-03-30 VITALS
TEMPERATURE: 98 F | HEART RATE: 55 BPM | OXYGEN SATURATION: 99 % | WEIGHT: 315 LBS | RESPIRATION RATE: 16 BRPM | SYSTOLIC BLOOD PRESSURE: 122 MMHG | DIASTOLIC BLOOD PRESSURE: 81 MMHG | HEIGHT: 71 IN

## 2023-03-30 DIAGNOSIS — Z98.890 OTHER SPECIFIED POSTPROCEDURAL STATES: Chronic | ICD-10-CM

## 2023-03-30 DIAGNOSIS — K63.5 POLYP OF COLON: ICD-10-CM

## 2023-03-30 DIAGNOSIS — Z90.3 ACQUIRED ABSENCE OF STOMACH [PART OF]: Chronic | ICD-10-CM

## 2023-03-30 LAB
APTT BLD: 30.3 SEC — SIGNIFICANT CHANGE UP (ref 27.5–35.5)
INR BLD: 1.15 RATIO — SIGNIFICANT CHANGE UP (ref 0.88–1.16)
PROTHROM AB SERPL-ACNC: 13.4 SEC — SIGNIFICANT CHANGE UP (ref 10.5–13.4)

## 2023-03-30 PROCEDURE — 85730 THROMBOPLASTIN TIME PARTIAL: CPT

## 2023-03-30 PROCEDURE — 36415 COLL VENOUS BLD VENIPUNCTURE: CPT

## 2023-03-30 PROCEDURE — 88305 TISSUE EXAM BY PATHOLOGIST: CPT | Mod: 26

## 2023-03-30 PROCEDURE — 45381 COLONOSCOPY SUBMUCOUS NJX: CPT | Mod: 59

## 2023-03-30 PROCEDURE — 85610 PROTHROMBIN TIME: CPT

## 2023-03-30 PROCEDURE — 45385 COLONOSCOPY W/LESION REMOVAL: CPT

## 2023-03-30 PROCEDURE — 88305 TISSUE EXAM BY PATHOLOGIST: CPT

## 2023-03-30 RX ORDER — FENTANYL CITRATE 50 UG/ML
25 INJECTION INTRAVENOUS
Refills: 0 | Status: DISCONTINUED | OUTPATIENT
Start: 2023-03-30 | End: 2023-03-30

## 2023-03-30 RX ORDER — SODIUM CHLORIDE 9 MG/ML
1000 INJECTION, SOLUTION INTRAVENOUS
Refills: 0 | Status: DISCONTINUED | OUTPATIENT
Start: 2023-03-30 | End: 2023-03-30

## 2023-03-30 RX ORDER — ONDANSETRON 8 MG/1
4 TABLET, FILM COATED ORAL ONCE
Refills: 0 | Status: DISCONTINUED | OUTPATIENT
Start: 2023-03-30 | End: 2023-03-30

## 2023-03-30 RX ORDER — ACETAMINOPHEN 500 MG
1000 TABLET ORAL ONCE
Refills: 0 | Status: DISCONTINUED | OUTPATIENT
Start: 2023-03-30 | End: 2023-03-30

## 2023-03-30 RX ORDER — OXYCODONE HYDROCHLORIDE 5 MG/1
5 TABLET ORAL ONCE
Refills: 0 | Status: DISCONTINUED | OUTPATIENT
Start: 2023-03-30 | End: 2023-03-30

## 2023-03-30 NOTE — BRIEF OPERATIVE NOTE - OPERATION/FINDINGS
Colonoscopy with unliftable polyp in the transverse colon. Central depression. Polyp resected edges for pathology.

## 2023-03-30 NOTE — ASU PATIENT PROFILE, ADULT - FALL HARM RISK - UNIVERSAL INTERVENTIONS
Bed in lowest position, wheels locked, appropriate side rails in place/Call bell, personal items and telephone in reach/Instruct patient to call for assistance before getting out of bed or chair/Non-slip footwear when patient is out of bed/[place] to call system/Physically safe environment - no spills, clutter or unnecessary equipment/Purposeful Proactive Rounding/Room/bathroom lighting operational, light cord in reach

## 2023-03-30 NOTE — ASU PATIENT PROFILE, ADULT - NSICDXPASTMEDICALHX_GEN_ALL_CORE_FT
PAST MEDICAL HISTORY:  Asthma     Gout     H/O CHF     H/O pulmonary hypertension     Hyperlipemia     Hypertension     Moderate obstructive sleep apnea

## 2023-04-04 PROBLEM — Z86.79 PERSONAL HISTORY OF OTHER DISEASES OF THE CIRCULATORY SYSTEM: Chronic | Status: ACTIVE | Noted: 2023-03-28

## 2023-04-04 PROBLEM — M10.9 GOUT, UNSPECIFIED: Chronic | Status: ACTIVE | Noted: 2023-03-28

## 2023-04-04 PROBLEM — G47.33 OBSTRUCTIVE SLEEP APNEA (ADULT) (PEDIATRIC): Chronic | Status: ACTIVE | Noted: 2023-03-28

## 2023-04-04 LAB — SURGICAL PATHOLOGY STUDY: SIGNIFICANT CHANGE UP

## 2023-04-05 DIAGNOSIS — I10 ESSENTIAL (PRIMARY) HYPERTENSION: ICD-10-CM

## 2023-04-05 DIAGNOSIS — Z79.51 LONG TERM (CURRENT) USE OF INHALED STEROIDS: ICD-10-CM

## 2023-04-05 DIAGNOSIS — Z98.84 BARIATRIC SURGERY STATUS: ICD-10-CM

## 2023-04-05 DIAGNOSIS — E78.5 HYPERLIPIDEMIA, UNSPECIFIED: ICD-10-CM

## 2023-04-05 DIAGNOSIS — D12.3 BENIGN NEOPLASM OF TRANSVERSE COLON: ICD-10-CM

## 2023-04-05 DIAGNOSIS — J45.909 UNSPECIFIED ASTHMA, UNCOMPLICATED: ICD-10-CM

## 2023-04-05 DIAGNOSIS — G47.33 OBSTRUCTIVE SLEEP APNEA (ADULT) (PEDIATRIC): ICD-10-CM

## 2023-04-05 DIAGNOSIS — I50.9 HEART FAILURE, UNSPECIFIED: ICD-10-CM

## 2023-04-05 DIAGNOSIS — F32.A DEPRESSION, UNSPECIFIED: ICD-10-CM

## 2023-04-05 DIAGNOSIS — M10.9 GOUT, UNSPECIFIED: ICD-10-CM

## 2023-04-05 DIAGNOSIS — K63.5 POLYP OF COLON: ICD-10-CM

## 2023-04-05 DIAGNOSIS — Z79.899 OTHER LONG TERM (CURRENT) DRUG THERAPY: ICD-10-CM

## 2023-04-05 DIAGNOSIS — Z99.89 DEPENDENCE ON OTHER ENABLING MACHINES AND DEVICES: ICD-10-CM

## 2023-04-11 ENCOUNTER — APPOINTMENT (OUTPATIENT)
Dept: COLORECTAL SURGERY | Facility: CLINIC | Age: 58
End: 2023-04-11
Payer: MEDICARE

## 2023-04-11 DIAGNOSIS — D12.6 BENIGN NEOPLASM OF COLON, UNSPECIFIED: ICD-10-CM

## 2023-04-11 PROCEDURE — 99443: CPT

## 2023-04-12 RX ORDER — METRONIDAZOLE 500 MG/1
500 TABLET ORAL
Qty: 6 | Refills: 0 | Status: ACTIVE | COMMUNITY
Start: 2023-04-12 | End: 1900-01-01

## 2023-04-17 RX ORDER — SODIUM CHLORIDE 9 MG/ML
3 INJECTION INTRAMUSCULAR; INTRAVENOUS; SUBCUTANEOUS EVERY 8 HOURS
Refills: 0 | Status: DISCONTINUED | OUTPATIENT
Start: 2023-04-24 | End: 2023-04-26

## 2023-04-18 ENCOUNTER — OUTPATIENT (OUTPATIENT)
Dept: OUTPATIENT SERVICES | Facility: HOSPITAL | Age: 58
LOS: 1 days | End: 2023-04-18
Payer: MEDICARE

## 2023-04-18 ENCOUNTER — RESULT REVIEW (OUTPATIENT)
Age: 58
End: 2023-04-18

## 2023-04-18 VITALS
HEIGHT: 71 IN | WEIGHT: 315 LBS | TEMPERATURE: 98 F | RESPIRATION RATE: 16 BRPM | OXYGEN SATURATION: 98 % | DIASTOLIC BLOOD PRESSURE: 83 MMHG | SYSTOLIC BLOOD PRESSURE: 126 MMHG | HEART RATE: 58 BPM

## 2023-04-18 DIAGNOSIS — K63.5 POLYP OF COLON: ICD-10-CM

## 2023-04-18 DIAGNOSIS — I10 ESSENTIAL (PRIMARY) HYPERTENSION: ICD-10-CM

## 2023-04-18 DIAGNOSIS — G47.33 OBSTRUCTIVE SLEEP APNEA (ADULT) (PEDIATRIC): ICD-10-CM

## 2023-04-18 DIAGNOSIS — Z98.890 OTHER SPECIFIED POSTPROCEDURAL STATES: Chronic | ICD-10-CM

## 2023-04-18 DIAGNOSIS — Z90.3 ACQUIRED ABSENCE OF STOMACH [PART OF]: Chronic | ICD-10-CM

## 2023-04-18 DIAGNOSIS — Z01.818 ENCOUNTER FOR OTHER PREPROCEDURAL EXAMINATION: ICD-10-CM

## 2023-04-18 LAB
A1C WITH ESTIMATED AVERAGE GLUCOSE RESULT: 6.4 % — HIGH (ref 4–5.6)
ABO RH CONFIRMATION: SIGNIFICANT CHANGE UP
ALBUMIN SERPL ELPH-MCNC: 3.9 G/DL — SIGNIFICANT CHANGE UP (ref 3.3–5)
ALP SERPL-CCNC: 40 U/L — SIGNIFICANT CHANGE UP (ref 40–120)
ALT FLD-CCNC: 38 U/L — SIGNIFICANT CHANGE UP (ref 12–78)
ANION GAP SERPL CALC-SCNC: 7 MMOL/L — SIGNIFICANT CHANGE UP (ref 5–17)
AST SERPL-CCNC: 38 U/L — HIGH (ref 15–37)
BASOPHILS # BLD AUTO: 0.03 K/UL — SIGNIFICANT CHANGE UP (ref 0–0.2)
BASOPHILS NFR BLD AUTO: 0.5 % — SIGNIFICANT CHANGE UP (ref 0–2)
BILIRUB DIRECT SERPL-MCNC: <0.1 MG/DL — SIGNIFICANT CHANGE UP (ref 0–0.3)
BILIRUB INDIRECT FLD-MCNC: >0.3 MG/DL — SIGNIFICANT CHANGE UP (ref 0.2–1)
BILIRUB SERPL-MCNC: 0.4 MG/DL — SIGNIFICANT CHANGE UP (ref 0.2–1.2)
BILIRUB SERPL-MCNC: 0.4 MG/DL — SIGNIFICANT CHANGE UP (ref 0.2–1.2)
BLD GP AB SCN SERPL QL: SIGNIFICANT CHANGE UP
BUN SERPL-MCNC: 25 MG/DL — HIGH (ref 7–23)
CALCIUM SERPL-MCNC: 9.1 MG/DL — SIGNIFICANT CHANGE UP (ref 8.5–10.1)
CEA SERPL-MCNC: 1.4 NG/ML — SIGNIFICANT CHANGE UP (ref 0–3.8)
CHLORIDE SERPL-SCNC: 104 MMOL/L — SIGNIFICANT CHANGE UP (ref 96–108)
CO2 SERPL-SCNC: 30 MMOL/L — SIGNIFICANT CHANGE UP (ref 22–31)
CREAT SERPL-MCNC: 1.44 MG/DL — HIGH (ref 0.5–1.3)
EGFR: 56 ML/MIN/1.73M2 — LOW
EOSINOPHIL # BLD AUTO: 0.17 K/UL — SIGNIFICANT CHANGE UP (ref 0–0.5)
EOSINOPHIL NFR BLD AUTO: 3.1 % — SIGNIFICANT CHANGE UP (ref 0–6)
ESTIMATED AVERAGE GLUCOSE: 137 MG/DL — HIGH (ref 68–114)
GLUCOSE SERPL-MCNC: 106 MG/DL — HIGH (ref 70–99)
HCT VFR BLD CALC: 42.5 % — SIGNIFICANT CHANGE UP (ref 39–50)
HGB BLD-MCNC: 13.5 G/DL — SIGNIFICANT CHANGE UP (ref 13–17)
IMM GRANULOCYTES NFR BLD AUTO: 0.2 % — SIGNIFICANT CHANGE UP (ref 0–0.9)
LYMPHOCYTES # BLD AUTO: 1.7 K/UL — SIGNIFICANT CHANGE UP (ref 1–3.3)
LYMPHOCYTES # BLD AUTO: 30.9 % — SIGNIFICANT CHANGE UP (ref 13–44)
MCHC RBC-ENTMCNC: 28.1 PG — SIGNIFICANT CHANGE UP (ref 27–34)
MCHC RBC-ENTMCNC: 31.8 GM/DL — LOW (ref 32–36)
MCV RBC AUTO: 88.5 FL — SIGNIFICANT CHANGE UP (ref 80–100)
MONOCYTES # BLD AUTO: 0.49 K/UL — SIGNIFICANT CHANGE UP (ref 0–0.9)
MONOCYTES NFR BLD AUTO: 8.9 % — SIGNIFICANT CHANGE UP (ref 2–14)
NEUTROPHILS # BLD AUTO: 3.11 K/UL — SIGNIFICANT CHANGE UP (ref 1.8–7.4)
NEUTROPHILS NFR BLD AUTO: 56.4 % — SIGNIFICANT CHANGE UP (ref 43–77)
PLATELET # BLD AUTO: 241 K/UL — SIGNIFICANT CHANGE UP (ref 150–400)
POTASSIUM SERPL-MCNC: 4 MMOL/L — SIGNIFICANT CHANGE UP (ref 3.5–5.3)
POTASSIUM SERPL-SCNC: 4 MMOL/L — SIGNIFICANT CHANGE UP (ref 3.5–5.3)
PROT SERPL-MCNC: 8.3 GM/DL — SIGNIFICANT CHANGE UP (ref 6–8.3)
RBC # BLD: 4.8 M/UL — SIGNIFICANT CHANGE UP (ref 4.2–5.8)
RBC # FLD: 16 % — HIGH (ref 10.3–14.5)
SODIUM SERPL-SCNC: 141 MMOL/L — SIGNIFICANT CHANGE UP (ref 135–145)
WBC # BLD: 5.51 K/UL — SIGNIFICANT CHANGE UP (ref 3.8–10.5)
WBC # FLD AUTO: 5.51 K/UL — SIGNIFICANT CHANGE UP (ref 3.8–10.5)

## 2023-04-18 PROCEDURE — 99214 OFFICE O/P EST MOD 30 MIN: CPT | Mod: 25

## 2023-04-18 PROCEDURE — 80053 COMPREHEN METABOLIC PANEL: CPT

## 2023-04-18 PROCEDURE — 86901 BLOOD TYPING SEROLOGIC RH(D): CPT

## 2023-04-18 PROCEDURE — 85025 COMPLETE CBC W/AUTO DIFF WBC: CPT

## 2023-04-18 PROCEDURE — 82248 BILIRUBIN DIRECT: CPT

## 2023-04-18 PROCEDURE — 86850 RBC ANTIBODY SCREEN: CPT

## 2023-04-18 PROCEDURE — 82247 BILIRUBIN TOTAL: CPT

## 2023-04-18 PROCEDURE — 71046 X-RAY EXAM CHEST 2 VIEWS: CPT | Mod: 26

## 2023-04-18 PROCEDURE — 86900 BLOOD TYPING SEROLOGIC ABO: CPT

## 2023-04-18 PROCEDURE — 71046 X-RAY EXAM CHEST 2 VIEWS: CPT

## 2023-04-18 PROCEDURE — 36415 COLL VENOUS BLD VENIPUNCTURE: CPT

## 2023-04-18 PROCEDURE — 82378 CARCINOEMBRYONIC ANTIGEN: CPT

## 2023-04-18 PROCEDURE — 83036 HEMOGLOBIN GLYCOSYLATED A1C: CPT

## 2023-04-18 NOTE — H&P PST ADULT - PROBLEM SELECTOR PLAN 1
Pre op and chlorhexidine instructions given and explained.  Avoid NSAIDs and OTC supplements.   Patient verbalized understanding  medical consult requested by surgeon  covid 19 swab scheduled 3/21/23, advised to quarantine after test

## 2023-04-18 NOTE — H&P PST ADULT - PROBLEM SELECTOR PLAN 2
On  the DOS  with sip of water take cardiac meds - amlodipine, carvedilol    Patient verbalized understanding.

## 2023-04-18 NOTE — H&P PST ADULT - NSICDXPASTMEDICALHX_GEN_ALL_CORE_FT
PAST MEDICAL HISTORY:  Asthma     Gout     H/O CHF     H/O pulmonary hypertension     Hyperlipemia     Hypertension     Moderate obstructive sleep apnea     Polyp of transverse colon

## 2023-04-18 NOTE — H&P PST ADULT - PROBLEM SELECTOR PLAN 3
From: Rm Lockett  Sent: 2/11/2019 9:10 AM EST  Subject: Medication Renewal Request    Rm Lockett would like a refill of the following medications:     amphetamine-dextroamphetamine (ADDERALL XR) 20 MG extended release capsule Deanne Navarro MD]   Patient Comment: PLEASE MAKE SURE THEY ARE FILLED BEFORE 2- I NEED TO PICK THEM UP ON THE 13TH    Preferred pharmacy: [facility] [phone]  [facility] OR notified

## 2023-04-18 NOTE — H&P PST ADULT - ASSESSMENT
56y/o male with hx HTN, HLD, Pulmonary HTN, gout, chf, JESSICA, obesity, gastric sleeve surgery ( 2016), presents to [facility] for scheduled laparoscopic open transverse colon resection and lymph node dissection on 23.     CAPRINI SCORE    AGE RELATED RISK FACTORS                                                       MOBILITY RELATED FACTORS  [x ] Age 41-60 years                                            (1 Point)                  [ ] Bed rest                                                        (1 Point)  [ ] Age: 61-74 years                                           (2 Points)                [ ] Plaster cast                                                   (2 Points)  [ ] Age= 75 years                                              (3 Points)                 [ ] Bed bound for more than 72 hours                   (2 Points)    DISEASE RELATED RISK FACTORS                                               GENDER SPECIFIC FACTORS  [ ] Edema in the lower extremities                       (1 Point)                  [ ] Pregnancy                                                     (1 Point)  [ ] Varicose veins                                               (1 Point)                  [ ] Post-partum < 6 weeks                                   (1 Point)             [x ] BMI > 25 Kg/m2                                            (1 Point)                  [ ] Hormonal therapy  or oral contraception            (1 Point)                 [ ] Sepsis (in the previous month)                        (1 Point)                  [ ] History of pregnancy complications  [ ] Pneumonia or serious lung disease                                               [ ] Unexplained or recurrent                       (1 Point)           (in the previous month)                               (1 Point)  [ ] Abnormal pulmonary function test                     (1 Point)                 SURGERY RELATED RISK FACTORS  [ ] Acute myocardial infarction                              (1 Point)                 [ ]  Section                                            (1 Point)  [ ] Congestive heart failure (in the previous month)  (1 Point)                 [ ] Minor surgery                                                 (1 Point)   [ ] Inflammatory bowel disease                             (1 Point)                 [ ] Arthroscopic surgery                                        (2 Points)  [ ] Central venous access                                    (2 Points)                [ x] General surgery lasting more than 45 minutes   (2 Points)       [ ] Stroke (in the previous month)                          (5 Points)               [ ] Elective arthroplasty                                        (5 Points)            [ ] malignancy                                                             (2 points)                                                                                                                                 HEMATOLOGY RELATED FACTORS                                                 TRAUMA RELATED RISK FACTORS  [ ] Prior episodes of VTE                                     (3 Points)                 [ ] Fracture of the hip, pelvis, or leg                       (5 Points)  [ ] Positive family history for VTE                         (3 Points)                 [ ] Acute spinal cord injury (in the previous month)  (5 Points)  [ ] Prothrombin 99725 A                                      (3 Points)                 [ ] Paralysis  (less than 1 month)                          (5 Points)  [ ] Factor V Leiden                                             (3 Points)                 [ ] Multiple Trauma within 1 month                         (5 Points)  [ ] Lupus anticoagulants                                     (3 Points)                                                           [ ] Anticardiolipin antibodies                                (3 Points)                                                       [ ] High homocysteine in the blood                      (3 Points)                                             [ ] Other congenital or acquired thrombophilia       (3 Points)                                                [ ] Heparin induced thrombocytopenia                  (3 Points)                                          Total Score [   4       ]  The Caprini score indicates this patient is at risk for a VTE event (score 3-5).  Most surgical patients in this group would benefit from pharmacologic prophylaxis.  The surgical team will determine the balance between VTE risk and bleeding risk

## 2023-04-18 NOTE — H&P PST ADULT - HISTORY OF PRESENT ILLNESS
56y/o male with hx HTN, HLD, Pulmonary HTN, gout, chf, JESSICA, obesity, gastric sleeve surgery ( 2016), presents to PST for scheduled laparoscopic open transverse colon resection and lymph node dissection on 4/24/23. Patient had a colonoscopy 10/2022 polyp noted referred to advance GI specialist for removal 3/2023 unsuccessful removal due to location  referred to surgeon.

## 2023-04-18 NOTE — H&P PST ADULT - IS PATIENT PREGNANT?
OB Progress Note:  PPD#1    S: 35yo PPD#1 s/p . Patient feels well. Pain is well controlled. She is tolerating a regular diet, voiding spontaneously, and ambulating without difficulty. Denies CP/SOB. Denies lightheadedness/dizziness. Denies N/V.     O:  Vital Signs Last 24 Hrs  T(C): 36.4 (2020 05:56), Max: 36.9 (2020 10:30)  HR: 76 (2020 05:56) (72 - 86)  BP: 109/74 (2020 05:56) (93/55 - 121/56)  BP(mean): 75 (2020 12:59) (67 - 80)  RR: 18 (2020 05:56) (16 - 18)  SpO2: 96% (2020 05:56) (96% - 99%)    Physical Exam:  General: NAD  Abdomen: soft, non-tender, non-distended, fundus firm  Vaginal: Lochia wnl  Extremities: No erythema/edema    MEDICATIONS  (STANDING):  acetaminophen   Tablet .. 975 milliGRAM(s) Oral <User Schedule>  diphtheria/tetanus/pertussis (acellular) Vaccine (ADAcel) 0.5 milliLiter(s) IntraMuscular once  ibuprofen  Tablet. 600 milliGRAM(s) Oral every 6 hours  oxytocin Infusion 333.333 milliUNIT(s)/Min (1000 mL/Hr) IV Continuous <Continuous>  oxytocin Infusion 333.333 milliUNIT(s)/Min (1000 mL/Hr) IV Continuous <Continuous>  oxytocin Infusion 41.667 milliUNIT(s)/Min (125 mL/Hr) IV Continuous <Continuous>  prenatal multivitamin 1 Tablet(s) Oral daily  sodium chloride 0.9% lock flush 3 milliLiter(s) IV Push every 8 hours      Labs:  Blood type: A Positive  Rubella IgG: RPR:                           12.0   10.95<H> >-----------< 240    (  @ 22:08 )             39.4 not applicable (Male)

## 2023-04-19 DIAGNOSIS — Z01.818 ENCOUNTER FOR OTHER PREPROCEDURAL EXAMINATION: ICD-10-CM

## 2023-04-19 DIAGNOSIS — K63.5 POLYP OF COLON: ICD-10-CM

## 2023-04-21 ENCOUNTER — APPOINTMENT (OUTPATIENT)
Dept: PULMONOLOGY | Facility: CLINIC | Age: 58
End: 2023-04-21
Payer: MEDICARE

## 2023-04-21 VITALS
DIASTOLIC BLOOD PRESSURE: 72 MMHG | HEART RATE: 70 BPM | SYSTOLIC BLOOD PRESSURE: 128 MMHG | OXYGEN SATURATION: 97 % | RESPIRATION RATE: 14 BRPM

## 2023-04-21 VITALS — WEIGHT: 315 LBS | HEIGHT: 71 IN | BODY MASS INDEX: 44.1 KG/M2

## 2023-04-21 PROBLEM — K63.5 POLYP OF COLON: Chronic | Status: ACTIVE | Noted: 2023-04-18

## 2023-04-21 PROCEDURE — 94727 GAS DIL/WSHOT DETER LNG VOL: CPT

## 2023-04-21 PROCEDURE — 94729 DIFFUSING CAPACITY: CPT

## 2023-04-21 PROCEDURE — 85018 HEMOGLOBIN: CPT | Mod: QW

## 2023-04-21 PROCEDURE — 99215 OFFICE O/P EST HI 40 MIN: CPT | Mod: 25

## 2023-04-21 PROCEDURE — 94010 BREATHING CAPACITY TEST: CPT

## 2023-04-21 NOTE — HISTORY OF PRESENT ILLNESS
[Follow-Up] : follow-up of [Currently Experiencing] : The patient is currently experiencing symptoms. [Inhaled Albuterol] : inhaled albuterol [Follow-Up - Routine Clinic] : a routine clinic follow-up of [Excess Weight] : excess weight [Inability to Lose Weight] : inability to lose weight [Dyspnea] : dyspnea [Back Pain] : back pain [Low Calorie Diet] : low calorie diet [Exercise Regimen] : exercise regimen [Fair Compliance] : fair compliance with treatment [Fair Tolerance] : fair tolerance of treatment [Fair Symptom Control] : fair symptom control [Sleep Apnea] : sleep apnea [High] : high [Low Calorie] : low calorie [On ___] : performed on [unfilled] [To Assess ___] : to assess [unfilled] [Patient] : the patient [None] : no new symptoms reported [de-identified] : intermittent wheeze, SOBOE, and intermittent chest tightness [TextBox_4] : H/o occasional wheeze and SOB especially with exertion; improved with Advair which he is now only using as needed.\par Pt was lost to f/u for 3 years\par Pt with CXR with ? RLL infiltrate vs atelectasis with elevated R hemidiaphragm with impaired motion but no paralysis\par He was previously hospitalized for SOB and was thought to have CHF. He reports multiple CHF exac and feels that this is caused by his CPAP therapy though I explained to him that CPAP should help CHF and his underlying cardiac function so refuses to use any more. He has an oral appliance which he apparently uses on occasion.\par He is very frustrated with his condition which I explained would like improve significantly with weight loss given his BMI>46. \par He also has restriction on PFTs which also may be weight related. He had lost weight after gastric sleeve surgery in 2016 but has gained most of it back. Prior imaging studies revealed only mild basilar atelectasis and possible pulm edema. \par W/u for elevated hemidiaphragm revealed impaired motion but no paralysis of the hemidiaphragm.\par Pt presents for evaluation prior to his upcoming colonoscopy and polypectomy.\par  [Good Compliance] : good compliance with treatment [Good Tolerance] : good tolerance of treatment [Good Symptom Control] : good symptom control [de-identified] : AutoCPAP [Diabetes] : no diabetes [Hypertension] : no hypertension [FreeTextEntry9] : CXR [FreeTextEntry8] : Mildly elevated R hemidiaphragm with ? infiltrate or compressive atelectasis

## 2023-04-21 NOTE — REASON FOR VISIT
[Follow-Up] : a follow-up visit [Sleep Apnea] : sleep apnea [Shortness of Breath] : shortness of breath [Obesity] : obesity [Pre-op Risk Stratification] : pre-op risk stratification

## 2023-04-21 NOTE — CONSULT LETTER
[Dear  ___] : Dear  [unfilled], [Consult Letter:] : I had the pleasure of evaluating your patient, [unfilled]. [Please see my note below.] : Please see my note below. [Consult Closing:] : Thank you very much for allowing me to participate in the care of this patient.  If you have any questions, please do not hesitate to contact me. [Sincerely,] : Sincerely, [DrMary  ___] : Dr. MONROE [FreeTextEntry3] : Maciel Shah MD, FCCP, D. ABSM\par Pulmonary and Sleep Medicine\par [facility] [place]

## 2023-04-21 NOTE — PROCEDURE
[FreeTextEntry1] : PFTs performed previously revealed a mildly reduced FVC and FEV1 without an obstructive pattern. Lung volumes revealed a mildly reduced TLC. Diffusion capacity was normal. Overall, his lung fxn was at baseline and revealed only mild restriction.\par PFTs performed subsequently in 2016 again revealed a restrictive pattern without a significant BD response. Lung volumes were mildly reduced with a normal diffusion capacity. This spirometry today is slightly reduced c/w his previous study.\par PFTs 1/13/21 - Moderate restriction with mildly reduced lung volumes and borderline reduced diffusion capacity which corrected for alveolar volume; overall worse restriction from previous in 2016\par PFTs 4/21/23 - Mild restriction with normal lung volumes; near baseline

## 2023-04-21 NOTE — DISCUSSION/SUMMARY
[FreeTextEntry1] : \par #1. Diet and exercise for weight loss\par #2. Could consider Advair but for now will continue as needed ProAir for wheeze and exertional SOB; pt improved clinically without chronic BD therapy\par #3. Exertional SOB and restrictive pattern on spirometry are likely weight related; follow lung function intermittently if pt allows\par #4. Prior CXR from 2018 was clear by report and repeat with mildly elevated R hemidiaphragm with ? RLL infiltrate vs atelectasis; xray fluoroscopy to evaluate possible paralysis of R hemidiaphragm revealed no paradoxical motion but with moderately impaired motion of unclear etiology; could consider neurologic evaluation but he has not been interested; repeat fluoroscopy for any worsening of R hemidiaphragm\par #5. Rec AutoCPAP 7-16 cm of water for moderate JESSICA which had improved from previous with weight loss; encouraged compliance with CPAP; The patient is using and benefitting from CPAP therapy \par #6.  Recommended second pulmonary opinion in the past since he seemed frustrated with his current condition and lack of improvement despite therapy\par #7. Consider eventual MCT to r/o asthma if intermittent SOB and wheeze persist but pt is not interested\par #8. Rec that lung function should be followed periodically when pt is willing\par #9. Consider bariatric surgery re-evaluation given weight gain; also recommended nutritionist lawrence; info given to pt previously; he is willing to consider surgical options if unable to lose weight on his own\par #10. Replace equipment as needed if uses CPAP; gave and ordered AirFit N30i mask previously; ordered equipment 4/21/23 with new FFM per pt preference\par #11. F/u with obesity medicine for weight loss\par #12. Recommended Covid vaccines; s/p one Covid vaccine\par #13. Pt now more willing to f/u and follow recommendations\par #14. The patient is at mildly increased risk for post op respiratory complications including but not limited to respiratory failure or prolonged intubation if GA is used. These risks are not prohibitive for his colonoscopy but would have Albuterol nebulizer therapy available if needed.  I would recommend that CPAP at 12 cm of water should be available to use post-op and with sleep. I would also recommend post op incentive spirometry, GI/DVT prophylaxis as needed, early ambulation as able, and adequate pain control. Would recommend that O2 saturation should be monitored during and after the procedure. \par #15. Reviewed risks of exposure and symptoms of Covid-19 virus, including how the virus is spread and precautions to avoid anita virus.\par \par The patient expressed understanding and agreement with the above recommendations/plan and accepts responsibility to be compliant with recommended testing, therapies, and f/u visits.\par All relevant questions and concerns were addressed.

## 2023-04-24 ENCOUNTER — TRANSCRIPTION ENCOUNTER (OUTPATIENT)
Age: 58
End: 2023-04-24

## 2023-04-24 ENCOUNTER — APPOINTMENT (OUTPATIENT)
Dept: COLORECTAL SURGERY | Facility: HOSPITAL | Age: 58
End: 2023-04-24

## 2023-04-24 ENCOUNTER — RESULT REVIEW (OUTPATIENT)
Age: 58
End: 2023-04-24

## 2023-04-24 ENCOUNTER — INPATIENT (INPATIENT)
Facility: HOSPITAL | Age: 58
LOS: 1 days | Discharge: HOME | DRG: 330 | End: 2023-04-26
Attending: COLON & RECTAL SURGERY | Admitting: COLON & RECTAL SURGERY
Payer: MEDICARE

## 2023-04-24 VITALS
TEMPERATURE: 98 F | RESPIRATION RATE: 15 BRPM | OXYGEN SATURATION: 98 % | HEIGHT: 71 IN | WEIGHT: 315 LBS | SYSTOLIC BLOOD PRESSURE: 113 MMHG | HEART RATE: 55 BPM | DIASTOLIC BLOOD PRESSURE: 62 MMHG

## 2023-04-24 DIAGNOSIS — Z90.3 ACQUIRED ABSENCE OF STOMACH [PART OF]: Chronic | ICD-10-CM

## 2023-04-24 DIAGNOSIS — K63.5 POLYP OF COLON: ICD-10-CM

## 2023-04-24 DIAGNOSIS — Z98.890 OTHER SPECIFIED POSTPROCEDURAL STATES: Chronic | ICD-10-CM

## 2023-04-24 LAB
GLUCOSE BLDC GLUCOMTR-MCNC: 134 MG/DL — HIGH (ref 70–99)
GLUCOSE BLDC GLUCOMTR-MCNC: 196 MG/DL — HIGH (ref 70–99)
GLUCOSE BLDC GLUCOMTR-MCNC: 97 MG/DL — SIGNIFICANT CHANGE UP (ref 70–99)

## 2023-04-24 PROCEDURE — 82962 GLUCOSE BLOOD TEST: CPT

## 2023-04-24 PROCEDURE — 36415 COLL VENOUS BLD VENIPUNCTURE: CPT

## 2023-04-24 PROCEDURE — C9399: CPT

## 2023-04-24 PROCEDURE — 44204 LAPARO PARTIAL COLECTOMY: CPT

## 2023-04-24 PROCEDURE — 88307 TISSUE EXAM BY PATHOLOGIST: CPT

## 2023-04-24 PROCEDURE — 84100 ASSAY OF PHOSPHORUS: CPT

## 2023-04-24 PROCEDURE — 99221 1ST HOSP IP/OBS SF/LOW 40: CPT

## 2023-04-24 PROCEDURE — 44213 LAP MOBIL SPLENIC FL ADD-ON: CPT | Mod: AS

## 2023-04-24 PROCEDURE — C9290: CPT

## 2023-04-24 PROCEDURE — C1889: CPT

## 2023-04-24 PROCEDURE — 88307 TISSUE EXAM BY PATHOLOGIST: CPT | Mod: 26

## 2023-04-24 PROCEDURE — 83735 ASSAY OF MAGNESIUM: CPT

## 2023-04-24 PROCEDURE — 80048 BASIC METABOLIC PNL TOTAL CA: CPT

## 2023-04-24 PROCEDURE — 44204 LAPARO PARTIAL COLECTOMY: CPT | Mod: AS

## 2023-04-24 PROCEDURE — 85027 COMPLETE CBC AUTOMATED: CPT

## 2023-04-24 PROCEDURE — 44213 LAP MOBIL SPLENIC FL ADD-ON: CPT

## 2023-04-24 RX ORDER — ATORVASTATIN CALCIUM 80 MG/1
80 TABLET, FILM COATED ORAL AT BEDTIME
Refills: 0 | Status: DISCONTINUED | OUTPATIENT
Start: 2023-04-24 | End: 2023-04-26

## 2023-04-24 RX ORDER — POTASSIUM CHLORIDE 20 MEQ
1 PACKET (EA) ORAL
Qty: 0 | Refills: 0 | DISCHARGE

## 2023-04-24 RX ORDER — ASPIRIN/CALCIUM CARB/MAGNESIUM 324 MG
1 TABLET ORAL
Qty: 0 | Refills: 0 | DISCHARGE

## 2023-04-24 RX ORDER — CARVEDILOL PHOSPHATE 80 MG/1
1 CAPSULE, EXTENDED RELEASE ORAL
Qty: 0 | Refills: 0 | DISCHARGE

## 2023-04-24 RX ORDER — OXYCODONE HYDROCHLORIDE 5 MG/1
5 TABLET ORAL EVERY 4 HOURS
Refills: 0 | Status: DISCONTINUED | OUTPATIENT
Start: 2023-04-24 | End: 2023-04-26

## 2023-04-24 RX ORDER — ONDANSETRON 8 MG/1
4 TABLET, FILM COATED ORAL EVERY 6 HOURS
Refills: 0 | Status: DISCONTINUED | OUTPATIENT
Start: 2023-04-24 | End: 2023-04-26

## 2023-04-24 RX ORDER — ONDANSETRON 8 MG/1
4 TABLET, FILM COATED ORAL ONCE
Refills: 0 | Status: DISCONTINUED | OUTPATIENT
Start: 2023-04-24 | End: 2023-04-24

## 2023-04-24 RX ORDER — HYDROCHLOROTHIAZIDE 25 MG
1 TABLET ORAL
Qty: 0 | Refills: 0 | DISCHARGE

## 2023-04-24 RX ORDER — GUAIFENESIN/DM/PSEUDOEPHEDRINE 595-32-48
0 TABLET, EXTENDED RELEASE 12 HR ORAL
Qty: 0 | Refills: 0 | DISCHARGE

## 2023-04-24 RX ORDER — OXYCODONE HYDROCHLORIDE 5 MG/1
10 TABLET ORAL EVERY 4 HOURS
Refills: 0 | Status: DISCONTINUED | OUTPATIENT
Start: 2023-04-24 | End: 2023-04-26

## 2023-04-24 RX ORDER — ENOXAPARIN SODIUM 100 MG/ML
40 INJECTION SUBCUTANEOUS EVERY 24 HOURS
Refills: 0 | Status: DISCONTINUED | OUTPATIENT
Start: 2023-04-25 | End: 2023-04-26

## 2023-04-24 RX ORDER — ALVIMOPAN 12 MG/1
12 CAPSULE ORAL EVERY 12 HOURS
Refills: 0 | Status: DISCONTINUED | OUTPATIENT
Start: 2023-04-25 | End: 2023-04-26

## 2023-04-24 RX ORDER — AMLODIPINE BESYLATE 2.5 MG/1
1 TABLET ORAL
Qty: 0 | Refills: 0 | DISCHARGE

## 2023-04-24 RX ORDER — SODIUM CHLORIDE 9 MG/ML
1000 INJECTION, SOLUTION INTRAVENOUS
Refills: 0 | Status: DISCONTINUED | OUTPATIENT
Start: 2023-04-24 | End: 2023-04-26

## 2023-04-24 RX ORDER — HEPARIN SODIUM 5000 [USP'U]/ML
5000 INJECTION INTRAVENOUS; SUBCUTANEOUS ONCE
Refills: 0 | Status: COMPLETED | OUTPATIENT
Start: 2023-04-24 | End: 2023-04-24

## 2023-04-24 RX ORDER — ALLOPURINOL 300 MG
300 TABLET ORAL DAILY
Refills: 0 | Status: DISCONTINUED | OUTPATIENT
Start: 2023-04-24 | End: 2023-04-26

## 2023-04-24 RX ORDER — CARVEDILOL PHOSPHATE 80 MG/1
12.5 CAPSULE, EXTENDED RELEASE ORAL EVERY 12 HOURS
Refills: 0 | Status: DISCONTINUED | OUTPATIENT
Start: 2023-04-24 | End: 2023-04-25

## 2023-04-24 RX ORDER — OXYCODONE HYDROCHLORIDE 5 MG/1
5 TABLET ORAL ONCE
Refills: 0 | Status: DISCONTINUED | OUTPATIENT
Start: 2023-04-24 | End: 2023-04-24

## 2023-04-24 RX ORDER — ACETAMINOPHEN 500 MG
650 TABLET ORAL EVERY 6 HOURS
Refills: 0 | Status: DISCONTINUED | OUTPATIENT
Start: 2023-04-24 | End: 2023-04-26

## 2023-04-24 RX ORDER — KETOROLAC TROMETHAMINE 30 MG/ML
30 SYRINGE (ML) INJECTION EVERY 6 HOURS
Refills: 0 | Status: DISCONTINUED | OUTPATIENT
Start: 2023-04-24 | End: 2023-04-25

## 2023-04-24 RX ORDER — ALVIMOPAN 12 MG/1
12 CAPSULE ORAL ONCE
Refills: 0 | Status: COMPLETED | OUTPATIENT
Start: 2023-04-24 | End: 2023-04-24

## 2023-04-24 RX ORDER — EZETIMIBE 10 MG/1
1 TABLET ORAL
Qty: 0 | Refills: 0 | DISCHARGE

## 2023-04-24 RX ORDER — ROSUVASTATIN CALCIUM 5 MG/1
1 TABLET ORAL
Qty: 0 | Refills: 0 | DISCHARGE

## 2023-04-24 RX ORDER — ACETAMINOPHEN 500 MG
1000 TABLET ORAL EVERY 6 HOURS
Refills: 0 | Status: COMPLETED | OUTPATIENT
Start: 2023-04-24 | End: 2023-04-25

## 2023-04-24 RX ORDER — CEFOTETAN DISODIUM 1 G
2 VIAL (EA) INJECTION ONCE
Refills: 0 | Status: COMPLETED | OUTPATIENT
Start: 2023-04-25 | End: 2023-04-25

## 2023-04-24 RX ORDER — ALLOPURINOL 300 MG
1 TABLET ORAL
Qty: 0 | Refills: 0 | DISCHARGE

## 2023-04-24 RX ORDER — SODIUM CHLORIDE 9 MG/ML
1000 INJECTION, SOLUTION INTRAVENOUS
Refills: 0 | Status: DISCONTINUED | OUTPATIENT
Start: 2023-04-24 | End: 2023-04-24

## 2023-04-24 RX ORDER — FENTANYL CITRATE 50 UG/ML
50 INJECTION INTRAVENOUS
Refills: 0 | Status: DISCONTINUED | OUTPATIENT
Start: 2023-04-24 | End: 2023-04-24

## 2023-04-24 RX ORDER — AMLODIPINE BESYLATE 2.5 MG/1
10 TABLET ORAL DAILY
Refills: 0 | Status: DISCONTINUED | OUTPATIENT
Start: 2023-04-24 | End: 2023-04-26

## 2023-04-24 RX ADMIN — ATORVASTATIN CALCIUM 80 MILLIGRAM(S): 80 TABLET, FILM COATED ORAL at 21:54

## 2023-04-24 RX ADMIN — Medication 1000 MILLIGRAM(S): at 21:50

## 2023-04-24 RX ADMIN — Medication 400 MILLIGRAM(S): at 21:50

## 2023-04-24 RX ADMIN — Medication 30 MILLIGRAM(S): at 21:52

## 2023-04-24 RX ADMIN — ALVIMOPAN 12 MILLIGRAM(S): 12 CAPSULE ORAL at 12:25

## 2023-04-24 RX ADMIN — HEPARIN SODIUM 5000 UNIT(S): 5000 INJECTION INTRAVENOUS; SUBCUTANEOUS at 12:25

## 2023-04-24 RX ADMIN — SODIUM CHLORIDE 3 MILLILITER(S): 9 INJECTION INTRAMUSCULAR; INTRAVENOUS; SUBCUTANEOUS at 22:42

## 2023-04-24 RX ADMIN — ENOXAPARIN SODIUM 40 MILLIGRAM(S): 100 INJECTION SUBCUTANEOUS at 23:49

## 2023-04-24 RX ADMIN — SODIUM CHLORIDE 100 MILLILITER(S): 9 INJECTION, SOLUTION INTRAVENOUS at 17:20

## 2023-04-24 RX ADMIN — FENTANYL CITRATE 50 MICROGRAM(S): 50 INJECTION INTRAVENOUS at 17:20

## 2023-04-24 NOTE — BRIEF OPERATIVE NOTE - NSICDXBRIEFPROCEDURE_GEN_ALL_CORE_FT
PROCEDURES:  Laparoscopic partial colectomy 24-Apr-2023 15:39:14  Huy Maloney  Mobilization of splenic flexure 24-Apr-2023 15:39:23  Huy Maloney   PROCEDURES:  Laparoscopic partial colectomy 24-Apr-2023 15:39:14  Huy Maloney  Mobilization of splenic flexure 24-Apr-2023 15:39:23  Huy Maloney  Insertion, biologic implant 24-Apr-2023 17:43:38  Huy Maloney

## 2023-04-24 NOTE — CONSULT NOTE ADULT - SUBJECTIVE AND OBJECTIVE BOX
PCP:  Dr Callejas    CHIEF COMPLAINT: sessilepolyp    HISTORY OF THE PRESENT ILLNESS: this is a 59 yo male with pmh: morbid Obesity, had gastric sleeve surgery in 2016, HTN, HLD, Pulmonary HTN, gout, and CHF, pt had a colonoscopy in October 2022 and was found to have a large Sessile polyp.  He was referred to an advanced GI specialist in March 2023 but they were also unable to removal the polyp.  Pt was referred to Dr Dunaway for colon resection.  Pt is seen in PACU, still sedated, VSS, S/P Laparoscopic Partial Colectomy.  We are consulted for Medical management.    PAST MEDICAL HISTORY: as above    PAST SURGICAL HISTORY: cardiac catheterization, gastric sleeve, colonoscopy    FAMILY HISTORY:  father: CAD     SOCIAL HISTORY:  never a smoker, + ETOH: admits to 1-2 drinks/ 2-3 x a week, +medical marijuana    ALLERGIES:  NKDA    HOME MEDS: see med rec    REVIEW OF SYSTEMS:  unable to assess 2/2 sedation    MEDICATIONS  (STANDING):  allopurinol 300 milliGRAM(s) Oral daily  amLODIPine   Tablet 10 milliGRAM(s) Oral daily  atorvastatin 80 milliGRAM(s) Oral at bedtime  carvedilol 12.5 milliGRAM(s) Oral every 12 hours  hydrochlorothiazide 12.5 milliGRAM(s) Oral daily  lactated ringers. 1000 milliLiter(s) (100 mL/Hr) IV Continuous <Continuous>  torsemide 20 milliGRAM(s) Oral daily    MEDICATIONS  (PRN):  fentaNYL    Injectable 50 MICROGram(s) IV Push every 10 minutes PRN Severe Pain (7 - 10)  ondansetron Injectable 4 milliGRAM(s) IV Push once PRN Nausea and/or Vomiting  oxyCODONE    IR 5 milliGRAM(s) Oral once PRN Moderate Pain (4 - 6)      VITALS:  T(F): 97.5 (04-24-23 @ 15:47), Max: 97.8 (04-24-23 @ 12:10)  HR: 66 (04-24-23 @ 16:30) (55 - 69)  BP: 136/81 (04-24-23 @ 16:15) (113/62 - 137/77)  RR: 20 (04-24-23 @ 16:30) (15 - 23)  SpO2: 98% (04-24-23 @ 16:30) (96% - 99%)  Wt(kg): --    I&O's Summary    24 Apr 2023 07:01  -  24 Apr 2023 16:29  --------------------------------------------------------  IN: 1500 mL / OUT: 0 mL / NET: 1500 mL    POCT Blood Glucose.: 134 mg/dL (24 Apr 2023 15:50)  POCT Blood Glucose.: 97 mg/dL (24 Apr 2023 11:59)    PHYSICAL EXAM:    GENERAL: Comfortable, no acute distress   HEAD:  Normocephalic, atraumatic  EYES: EOMI, PERRLA  HEENT: Moist mucous membranes  NECK: Supple, No JVD  NERVOUS SYSTEM:  sedated  CHEST/LUNG: Clear to auscultation bilaterally  HEART: Regular rate and rhythm  ABDOMEN: Soft, obese,  lap sites c/d/i   GENITOURINARY: Voiding, no palpable bladder  EXTREMITIES:   No clubbing, cyanosis, or edema  MUSCULOSKELETAL- No muscle tenderness, no joint tenderness  SKIN-no rash            LABS: pending          IMPRESSION: 59 yo female with above pmh. a/w:    # large sessile Polyp  #S/P laparascopic partial colectomy  pain control  IVF's  Monitor I& O  Monitor Cbc, BMP  BGM per CRS protocol  Cont Abxs  diet per CRS  Enterag    #HTN/PULM HTN/CHF  hold HCTZ/torsemide for now  cont amlodipine, carvedilol  appears euvolemic now  resume diuretics if fluid overloaded    # HLD  cont statin    #Gout  cont allopurinol    # morbid obesity   dietary/lifestyle modifications    #VTE prophylaxis  lovenox  Venodynes  Amb      thank you for the consult, will follow         PCP:  Dr Callejas    CHIEF COMPLAINT: sessilepolyp    HISTORY OF THE PRESENT ILLNESS: this is a 59 yo male with pmh: morbid Obesity, had gastric sleeve surgery in 2016, HTN, HLD, Pulmonary HTN, gout, and CHF, pt had a colonoscopy in October 2022 and was found to have a large Sessile polyp.  He was referred to an advanced GI specialist in March 2023 but they were also unable to removal the polyp.  Pt was referred to Dr Dunaway for colon resection.  Pt is seen in PACU, still sedated, VSS, S/P Laparoscopic Partial Colectomy.  We are consulted for Medical management.    PAST MEDICAL HISTORY: as above    PAST SURGICAL HISTORY: cardiac catheterization, gastric sleeve, colonoscopy    FAMILY HISTORY:  father: CAD     SOCIAL HISTORY:  never a smoker, + ETOH: admits to 1-2 drinks/ 2-3 x a week, +medical marijuana    ALLERGIES:  NKDA    HOME MEDS: see med rec    REVIEW OF SYSTEMS:  unable to assess 2/2 sedation    MEDICATIONS  (STANDING):  allopurinol 300 milliGRAM(s) Oral daily  amLODIPine   Tablet 10 milliGRAM(s) Oral daily  atorvastatin 80 milliGRAM(s) Oral at bedtime  carvedilol 12.5 milliGRAM(s) Oral every 12 hours  hydrochlorothiazide 12.5 milliGRAM(s) Oral daily  lactated ringers. 1000 milliLiter(s) (100 mL/Hr) IV Continuous <Continuous>  torsemide 20 milliGRAM(s) Oral daily    MEDICATIONS  (PRN):  fentaNYL    Injectable 50 MICROGram(s) IV Push every 10 minutes PRN Severe Pain (7 - 10)  ondansetron Injectable 4 milliGRAM(s) IV Push once PRN Nausea and/or Vomiting  oxyCODONE    IR 5 milliGRAM(s) Oral once PRN Moderate Pain (4 - 6)      VITALS:  T(F): 97.5 (04-24-23 @ 15:47), Max: 97.8 (04-24-23 @ 12:10)  HR: 66 (04-24-23 @ 16:30) (55 - 69)  BP: 136/81 (04-24-23 @ 16:15) (113/62 - 137/77)  RR: 20 (04-24-23 @ 16:30) (15 - 23)  SpO2: 98% (04-24-23 @ 16:30) (96% - 99%)  Wt(kg): --    I&O's Summary    24 Apr 2023 07:01  -  24 Apr 2023 16:29  --------------------------------------------------------  IN: 1500 mL / OUT: 0 mL / NET: 1500 mL    POCT Blood Glucose.: 134 mg/dL (24 Apr 2023 15:50)  POCT Blood Glucose.: 97 mg/dL (24 Apr 2023 11:59)    PHYSICAL EXAM:    GENERAL: Comfortable, no acute distress   HEAD:  Normocephalic, atraumatic  EYES: EOMI, PERRLA  HEENT: Moist mucous membranes  NECK: Supple, No JVD  NERVOUS SYSTEM:  sedated  CHEST/LUNG: Clear to auscultation bilaterally  HEART: Regular rate and rhythm  ABDOMEN: Soft, obese,  lap sites c/d/i   GENITOURINARY: Voiding, no palpable bladder  EXTREMITIES:   No clubbing, cyanosis, or edema  MUSCULOSKELETAL- No muscle tenderness, no joint tenderness  SKIN-no rash            LABS: pending          IMPRESSION: 59 yo female with above pmh. a/w:    # large sessile Polyp  #S/P laparascopic partial colectomy  pain control  IVF's  Monitor I& O  Monitor Cbc, BMP  BGM per CRS protocol  Cont Abxs  diet per CRS  Enterag    #HTN/PULM HTN/CHF  hold HCTZ/torsemide for now  cont amlodipine, carvedilol  appears euvolemic now  resume diuretics if fluid overloaded    #CKD3a  baseline creat 1.3-1.4    # HLD  cont statin    #Gout  cont allopurinol    # morbid obesity   dietary/lifestyle modifications    #VTE prophylaxis  lovenox  Venodynes  Amb      thank you for the consult, will follow

## 2023-04-24 NOTE — CONSULT NOTE ADULT - NS ATTEND AMEND GEN_ALL_CORE FT
Patient is seen and examined  59yo/M with large sessile colon polyp, unable to remove as outpatient, underwent partial colectomy  CRS team following  Pain meds prn  Diet per CRS team  Monitor postop HH, electrolytes  Incentive spirometry  Ambulate  Awaiting return of bowel function postop  Agree with above assessment and plan

## 2023-04-24 NOTE — BRIEF OPERATIVE NOTE - COMMENTS
I, MARCELINA Brown, was present with Dr. Dunaway for the entirety of the procedure, assisting during all key portions of the procedure. For further details, please refer to his operative dictation.

## 2023-04-25 ENCOUNTER — TRANSCRIPTION ENCOUNTER (OUTPATIENT)
Age: 58
End: 2023-04-25

## 2023-04-25 LAB
ANION GAP SERPL CALC-SCNC: 3 MMOL/L — LOW (ref 5–17)
BUN SERPL-MCNC: 22 MG/DL — SIGNIFICANT CHANGE UP (ref 7–23)
CALCIUM SERPL-MCNC: 9 MG/DL — SIGNIFICANT CHANGE UP (ref 8.5–10.1)
CHLORIDE SERPL-SCNC: 108 MMOL/L — SIGNIFICANT CHANGE UP (ref 96–108)
CO2 SERPL-SCNC: 25 MMOL/L — SIGNIFICANT CHANGE UP (ref 22–31)
CREAT SERPL-MCNC: 1.72 MG/DL — HIGH (ref 0.5–1.3)
EGFR: 46 ML/MIN/1.73M2 — LOW
GLUCOSE BLDC GLUCOMTR-MCNC: 134 MG/DL — HIGH (ref 70–99)
GLUCOSE BLDC GLUCOMTR-MCNC: 161 MG/DL — HIGH (ref 70–99)
GLUCOSE SERPL-MCNC: 134 MG/DL — HIGH (ref 70–99)
HCT VFR BLD CALC: 41.2 % — SIGNIFICANT CHANGE UP (ref 39–50)
HGB BLD-MCNC: 12.8 G/DL — LOW (ref 13–17)
MAGNESIUM SERPL-MCNC: 1.9 MG/DL — SIGNIFICANT CHANGE UP (ref 1.6–2.6)
MCHC RBC-ENTMCNC: 27.7 PG — SIGNIFICANT CHANGE UP (ref 27–34)
MCHC RBC-ENTMCNC: 31.1 GM/DL — LOW (ref 32–36)
MCV RBC AUTO: 89.2 FL — SIGNIFICANT CHANGE UP (ref 80–100)
PHOSPHATE SERPL-MCNC: 2.8 MG/DL — SIGNIFICANT CHANGE UP (ref 2.5–4.5)
PLATELET # BLD AUTO: 243 K/UL — SIGNIFICANT CHANGE UP (ref 150–400)
POTASSIUM SERPL-MCNC: 3.6 MMOL/L — SIGNIFICANT CHANGE UP (ref 3.5–5.3)
POTASSIUM SERPL-SCNC: 3.6 MMOL/L — SIGNIFICANT CHANGE UP (ref 3.5–5.3)
RBC # BLD: 4.62 M/UL — SIGNIFICANT CHANGE UP (ref 4.2–5.8)
RBC # FLD: 16.2 % — HIGH (ref 10.3–14.5)
SODIUM SERPL-SCNC: 136 MMOL/L — SIGNIFICANT CHANGE UP (ref 135–145)
WBC # BLD: 12.87 K/UL — HIGH (ref 3.8–10.5)
WBC # FLD AUTO: 12.87 K/UL — HIGH (ref 3.8–10.5)

## 2023-04-25 PROCEDURE — 99232 SBSQ HOSP IP/OBS MODERATE 35: CPT

## 2023-04-25 RX ORDER — CARVEDILOL PHOSPHATE 80 MG/1
12.5 CAPSULE, EXTENDED RELEASE ORAL DAILY
Refills: 0 | Status: DISCONTINUED | OUTPATIENT
Start: 2023-04-25 | End: 2023-04-26

## 2023-04-25 RX ORDER — OXYCODONE AND ACETAMINOPHEN 5; 325 MG/1; MG/1
1 TABLET ORAL
Qty: 20 | Refills: 0
Start: 2023-04-25

## 2023-04-25 RX ADMIN — Medication 400 MILLIGRAM(S): at 17:14

## 2023-04-25 RX ADMIN — Medication 1000 MILLIGRAM(S): at 10:24

## 2023-04-25 RX ADMIN — AMLODIPINE BESYLATE 10 MILLIGRAM(S): 2.5 TABLET ORAL at 10:22

## 2023-04-25 RX ADMIN — Medication 100 GRAM(S): at 03:25

## 2023-04-25 RX ADMIN — Medication 30 MILLIGRAM(S): at 03:24

## 2023-04-25 RX ADMIN — ALVIMOPAN 12 MILLIGRAM(S): 12 CAPSULE ORAL at 10:25

## 2023-04-25 RX ADMIN — Medication 1000 MILLIGRAM(S): at 03:24

## 2023-04-25 RX ADMIN — SODIUM CHLORIDE 3 MILLILITER(S): 9 INJECTION INTRAMUSCULAR; INTRAVENOUS; SUBCUTANEOUS at 05:48

## 2023-04-25 RX ADMIN — Medication 400 MILLIGRAM(S): at 10:24

## 2023-04-25 RX ADMIN — OXYCODONE HYDROCHLORIDE 10 MILLIGRAM(S): 5 TABLET ORAL at 22:41

## 2023-04-25 RX ADMIN — Medication 400 MILLIGRAM(S): at 03:24

## 2023-04-25 RX ADMIN — SODIUM CHLORIDE 3 MILLILITER(S): 9 INJECTION INTRAMUSCULAR; INTRAVENOUS; SUBCUTANEOUS at 13:18

## 2023-04-25 RX ADMIN — SODIUM CHLORIDE 3 MILLILITER(S): 9 INJECTION INTRAMUSCULAR; INTRAVENOUS; SUBCUTANEOUS at 22:24

## 2023-04-25 RX ADMIN — ALVIMOPAN 12 MILLIGRAM(S): 12 CAPSULE ORAL at 22:10

## 2023-04-25 RX ADMIN — Medication 300 MILLIGRAM(S): at 10:25

## 2023-04-25 RX ADMIN — ENOXAPARIN SODIUM 40 MILLIGRAM(S): 100 INJECTION SUBCUTANEOUS at 22:10

## 2023-04-25 RX ADMIN — ATORVASTATIN CALCIUM 80 MILLIGRAM(S): 80 TABLET, FILM COATED ORAL at 22:11

## 2023-04-25 RX ADMIN — Medication 1000 MILLIGRAM(S): at 17:14

## 2023-04-25 RX ADMIN — OXYCODONE HYDROCHLORIDE 10 MILLIGRAM(S): 5 TABLET ORAL at 22:11

## 2023-04-25 RX ADMIN — CARVEDILOL PHOSPHATE 12.5 MILLIGRAM(S): 80 CAPSULE, EXTENDED RELEASE ORAL at 06:27

## 2023-04-25 RX ADMIN — Medication 30 MILLIGRAM(S): at 03:25

## 2023-04-25 NOTE — PROGRESS NOTE ADULT - SUBJECTIVE AND OBJECTIVE BOX
PCP:  Dr Callejas    CHIEF COMPLAINT: sessilepolyp    HISTORY OF THE PRESENT ILLNESS: this is a 57 yo male with pmh: morbid Obesity, had gastric sleeve surgery in 2016, HTN, HLD, Pulmonary HTN, gout, and CHF, pt had a colonoscopy in October 2022 and was found to have a large Sessile polyp.  He was referred to an advanced GI specialist in March 2023 but they were also unable to removal the polyp.  Pt was referred to Dr Dunaway for colon resection.  S/P Laparoscopic Partial Colectomy.  We are consulted for Medical management.    pt seen OOB to chair, awake, alert, comfortable, sylvain clear liqs, no bm, no flatus, denies any N/V, munoz d/c'd + voiding    REVIEW OF SYSTEMS:  all 10 systems reviewed and a negative with pertinent positives documented in HPI    MEDICATIONS  (STANDING):  acetaminophen   IVPB .. 1000 milliGRAM(s) IV Intermittent every 6 hours  allopurinol 300 milliGRAM(s) Oral daily  alvimopan 12 milliGRAM(s) Oral every 12 hours  amLODIPine   Tablet 10 milliGRAM(s) Oral daily  atorvastatin 80 milliGRAM(s) Oral at bedtime  carvedilol 12.5 milliGRAM(s) Oral daily  enoxaparin Injectable 40 milliGRAM(s) SubCutaneous every 24 hours  lactated ringers. 1000 milliLiter(s) (125 mL/Hr) IV Continuous <Continuous>  sodium chloride 0.9% lock flush 3 milliLiter(s) IV Push every 8 hours    MEDICATIONS  (PRN):  acetaminophen     Tablet .. 650 milliGRAM(s) Oral every 6 hours PRN Temp greater or equal to 38C (100.4F), Mild Pain (1 - 3)  ondansetron Injectable 4 milliGRAM(s) IV Push every 6 hours PRN Nausea and/or Vomiting  oxyCODONE    IR 5 milliGRAM(s) Oral every 4 hours PRN Moderate Pain (4 - 6)  oxyCODONE    IR 10 milliGRAM(s) Oral every 4 hours PRN Severe Pain (7 - 10)    Vital Signs Last 24 Hrs  T(C): 36.4 (04-25-23 @ 11:31), Max: 37.3 (04-25-23 @ 00:24)  T(F): 97.5 (04-25-23 @ 11:31), Max: 99.1 (04-25-23 @ 00:24)  HR: 55 (04-25-23 @ 11:31) (55 - 78)  BP: 121/78 (04-25-23 @ 11:31) (106/54 - 152/84)  BP(mean): --  RR: 18 (04-25-23 @ 11:31) (12 - 25)  SpO2: 97% (04-25-23 @ 11:31) (95% - 100%)        POCT:  POCT Blood Glucose.: 161 mg/dL (04.25.23 @ 11:47)   POCT Blood Glucose.: 134 mg/dL (04.25.23 @ 06:27)   POCT Blood Glucose.: 134 mg/dL (24 Apr 2023 15:50)  POCT Blood Glucose.: 97 mg/dL (24 Apr 2023 11:59)    PHYSICAL EXAM:    GENERAL: Comfortable, no acute distress   HEAD:  Normocephalic, atraumatic  EYES: EOMI, PERRLA  HEENT: Moist mucous membranes  NECK: Supple, No JVD  NERVOUS SYSTEM:  sedated  CHEST/LUNG: Clear to auscultation bilaterally  HEART: Regular rate and rhythm  ABDOMEN: Soft, obese,  lap sites c/d/i , + pervena, BS+, no bm, no flatus  GENITOURINARY: Voiding, no palpable bladder  EXTREMITIES:   No clubbing, cyanosis, or edema  MUSCULOSKELETAL- No muscle tenderness, no joint tenderness  sKIN-no rash      LABS:                        12.8   12.87 )-----------( 243      ( 25 Apr 2023 07:49 )             41.2       04-25    136  |  108  |  22  ----------------------------<  134<H>  3.6   |  25  |  1.72<H>    Ca    9.0      25 Apr 2023 07:49  Phos  2.8     04-25  Mg     1.9     04-25            IMPRESSION: 57 yo female with above pmh. a/w:    # large sessile Polyp  #S/P laparascopic partial colectomy, POD#1  pain control  IVF's  Monitor I& O  Monitor Cbc, BMP  BGM per CRS protocol  Cont Abxs  diet per CRS  Enterag    #HTN/PULM HTN/CHF  hold HCTZ/torsemide for now  cont amlodipine, carvedilol  appears euvolemic now  resume diuretics if fluid overloaded    #ORTEGA with h/o CKD3a  baseline creat 1.3-1.4  now 1.7. most likely from hypovolemia  cont IVF's  d/c toradol, avoid nephrotoxic agents    # HLD  cont statin    #Gout  cont allopurinol    # morbid obesity   dietary/lifestyle modifications    #VTE prophylaxis  lovenox  Venodynes  Amb    dispo: home once bowel function returns         PCP:  Dr Callejas    CHIEF COMPLAINT: sessilepolyp    HISTORY OF THE PRESENT ILLNESS: this is a 57 yo male with pmh: morbid Obesity, had gastric sleeve surgery in 2016, HTN, HLD, Pulmonary HTN, gout, and CHF, pt had a colonoscopy in October 2022 and was found to have a large Sessile polyp.  He was referred to an advanced GI specialist in March 2023 but they were also unable to removal the polyp.  Pt was referred to Dr Dunaway for colon resection.  S/P Laparoscopic Partial Colectomy.  We are consulted for Medical management.    pt seen OOB to chair, awake, alert, comfortable, sylvain clear liqs, no bm, no flatus, denies any N/V, munoz d/c'd + voiding    REVIEW OF SYSTEMS:  all 10 systems reviewed and a negative with pertinent positives documented in HPI    MEDICATIONS  (STANDING):  acetaminophen   IVPB .. 1000 milliGRAM(s) IV Intermittent every 6 hours  allopurinol 300 milliGRAM(s) Oral daily  alvimopan 12 milliGRAM(s) Oral every 12 hours  amLODIPine   Tablet 10 milliGRAM(s) Oral daily  atorvastatin 80 milliGRAM(s) Oral at bedtime  carvedilol 12.5 milliGRAM(s) Oral daily  enoxaparin Injectable 40 milliGRAM(s) SubCutaneous every 24 hours  lactated ringers. 1000 milliLiter(s) (125 mL/Hr) IV Continuous <Continuous>  sodium chloride 0.9% lock flush 3 milliLiter(s) IV Push every 8 hours    MEDICATIONS  (PRN):  acetaminophen     Tablet .. 650 milliGRAM(s) Oral every 6 hours PRN Temp greater or equal to 38C (100.4F), Mild Pain (1 - 3)  ondansetron Injectable 4 milliGRAM(s) IV Push every 6 hours PRN Nausea and/or Vomiting  oxyCODONE    IR 5 milliGRAM(s) Oral every 4 hours PRN Moderate Pain (4 - 6)  oxyCODONE    IR 10 milliGRAM(s) Oral every 4 hours PRN Severe Pain (7 - 10)    Vital Signs Last 24 Hrs  T(C): 36.4 (04-25-23 @ 11:31), Max: 37.3 (04-25-23 @ 00:24)  T(F): 97.5 (04-25-23 @ 11:31), Max: 99.1 (04-25-23 @ 00:24)  HR: 55 (04-25-23 @ 11:31) (55 - 78)  BP: 121/78 (04-25-23 @ 11:31) (106/54 - 152/84)  BP(mean): --  RR: 18 (04-25-23 @ 11:31) (12 - 25)  SpO2: 97% (04-25-23 @ 11:31) (95% - 100%)        POCT:  POCT Blood Glucose.: 161 mg/dL (04.25.23 @ 11:47)   POCT Blood Glucose.: 134 mg/dL (04.25.23 @ 06:27)   POCT Blood Glucose.: 134 mg/dL (24 Apr 2023 15:50)  POCT Blood Glucose.: 97 mg/dL (24 Apr 2023 11:59)    PHYSICAL EXAM:    GENERAL: Comfortable, no acute distress   HEAD:  Normocephalic, atraumatic  EYES: EOMI, PERRLA  HEENT: Moist mucous membranes  NECK: Supple, No JVD  NERVOUS SYSTEM:  sedated  CHEST/LUNG: Clear to auscultation bilaterally  HEART: Regular rate and rhythm  ABDOMEN: Soft, obese,  lap sites c/d/i , + pervena, BS+, no bm, no flatus  GENITOURINARY: Voiding, no palpable bladder  EXTREMITIES:   No clubbing, cyanosis, or edema  MUSCULOSKELETAL- No muscle tenderness, no joint tenderness  sKIN-no rash      LABS:                        12.8   12.87 )-----------( 243      ( 25 Apr 2023 07:49 )             41.2       04-25    136  |  108  |  22  ----------------------------<  134<H>  3.6   |  25  |  1.72<H>    Ca    9.0      25 Apr 2023 07:49  Phos  2.8     04-25  Mg     1.9     04-25            IMPRESSION: 57 yo female with above pmh. a/w:    # large sessile Polyp  #S/P laparascopic partial colectomy, POD#1  pain control  IVF's  Monitor I& O  Monitor Cbc, BMP  BGM per CRS protocol  Cont Abxs  diet per CRS  Enterag    #HTN/PULM HTN/ HFpEF   last echo in 2022 LVEF 50%  hold HCTZ/torsemide for now  cont amlodipine, carvedilol  appears euvolemic now  resume diuretics if fluid overloaded    #ORTEGA with h/o CKD3a  baseline creat 1.3-1.4  now 1.7. most likely from hypovolemia  cont IVF's  d/c toradol, avoid nephrotoxic agents    #Non obs CAD  cont bb, statin  resume asa when ok with surgery    # HLD  cont statin    #Gout  cont allopurinol    # morbid obesity   dietary/lifestyle modifications    #VTE prophylaxis  lovenox  Venodynes  Amb    dispo: home once bowel function returns         PCP:  Dr Callejas    CHIEF COMPLAINT: sessilepolyp    HISTORY OF THE PRESENT ILLNESS: this is a 57 yo male with pmh: morbid Obesity, had gastric sleeve surgery in 2016, HTN, HLD, Pulmonary HTN, gout, and CHF, pt had a colonoscopy in October 2022 and was found to have a large Sessile polyp.  He was referred to an advanced GI specialist in March 2023 but they were also unable to removal the polyp.  Pt was referred to Dr Dunaway for colon resection.  S/P Laparoscopic Partial Colectomy.  We are consulted for Medical management.    pt seen OOB to chair, awake, alert, comfortable, sylvain clear liqs, no bm, no flatus, denies any N/V, munoz d/c'd + voiding    REVIEW OF SYSTEMS:  all 10 systems reviewed and a negative with pertinent positives documented in HPI    MEDICATIONS  (STANDING):  acetaminophen   IVPB .. 1000 milliGRAM(s) IV Intermittent every 6 hours  allopurinol 300 milliGRAM(s) Oral daily  alvimopan 12 milliGRAM(s) Oral every 12 hours  amLODIPine   Tablet 10 milliGRAM(s) Oral daily  atorvastatin 80 milliGRAM(s) Oral at bedtime  carvedilol 12.5 milliGRAM(s) Oral daily  enoxaparin Injectable 40 milliGRAM(s) SubCutaneous every 24 hours  lactated ringers. 1000 milliLiter(s) (125 mL/Hr) IV Continuous <Continuous>  sodium chloride 0.9% lock flush 3 milliLiter(s) IV Push every 8 hours    MEDICATIONS  (PRN):  acetaminophen     Tablet .. 650 milliGRAM(s) Oral every 6 hours PRN Temp greater or equal to 38C (100.4F), Mild Pain (1 - 3)  ondansetron Injectable 4 milliGRAM(s) IV Push every 6 hours PRN Nausea and/or Vomiting  oxyCODONE    IR 5 milliGRAM(s) Oral every 4 hours PRN Moderate Pain (4 - 6)  oxyCODONE    IR 10 milliGRAM(s) Oral every 4 hours PRN Severe Pain (7 - 10)    Vital Signs Last 24 Hrs  T(C): 36.4 (04-25-23 @ 11:31), Max: 37.3 (04-25-23 @ 00:24)  T(F): 97.5 (04-25-23 @ 11:31), Max: 99.1 (04-25-23 @ 00:24)  HR: 55 (04-25-23 @ 11:31) (55 - 78)  BP: 121/78 (04-25-23 @ 11:31) (106/54 - 152/84)  BP(mean): --  RR: 18 (04-25-23 @ 11:31) (12 - 25)  SpO2: 97% (04-25-23 @ 11:31) (95% - 100%)        POCT:  POCT Blood Glucose.: 161 mg/dL (04.25.23 @ 11:47)   POCT Blood Glucose.: 134 mg/dL (04.25.23 @ 06:27)   POCT Blood Glucose.: 134 mg/dL (24 Apr 2023 15:50)  POCT Blood Glucose.: 97 mg/dL (24 Apr 2023 11:59)    PHYSICAL EXAM:    GENERAL: Comfortable, no acute distress   HEAD:  Normocephalic, atraumatic  EYES: EOMI, PERRLA  HEENT: Moist mucous membranes  NECK: Supple, No JVD  NERVOUS SYSTEM:  sedated  CHEST/LUNG: Clear to auscultation bilaterally  HEART: Regular rate and rhythm  ABDOMEN: Soft, obese,  lap sites c/d/i , + pervena, BS+, no bm, no flatus  GENITOURINARY: Voiding, no palpable bladder  EXTREMITIES:   No clubbing, cyanosis, or edema  MUSCULOSKELETAL- No muscle tenderness, no joint tenderness  sKIN-no rash      LABS:                        12.8   12.87 )-----------( 243      ( 25 Apr 2023 07:49 )             41.2       04-25    136  |  108  |  22  ----------------------------<  134<H>  3.6   |  25  |  1.72<H>    Ca    9.0      25 Apr 2023 07:49  Phos  2.8     04-25  Mg     1.9     04-25            IMPRESSION: 57 yo female with above pmh. a/w:    # large sessile Polyp  #S/P laparascopic partial colectomy, POD#1  pain control  IVF's  Monitor I& O  Monitor Cbc, BMP  BGM per CRS protocol  Cont Abxs  diet per CRS  Enterag    #Leukocytosis  likely reactive  no s/s of infn, afebrile    #HTN/PULM HTN/ HFpEF   last echo in 2022 LVEF 50%  hold HCTZ/torsemide for now  cont amlodipine, carvedilol  appears euvolemic now  resume diuretics if fluid overloaded    #ORTEGA with h/o CKD3a  baseline creat 1.3-1.4  now 1.7. most likely from hypovolemia  cont IVF's  d/c toradol, avoid nephrotoxic agents    #Non obs CAD  cont bb, statin  resume asa when ok with surgery    # HLD  cont statin    #Gout  cont allopurinol    # morbid obesity   dietary/lifestyle modifications    #VTE prophylaxis  lovenox  Venodynes  Amb    dispo: home once bowel function returns

## 2023-04-25 NOTE — DISCHARGE NOTE PROVIDER - NSDCCPTREATMENT_GEN_ALL_CORE_FT
PRINCIPAL PROCEDURE  Procedure: Laparoscopic partial colectomy  Findings and Treatment:       SECONDARY PROCEDURE  Procedure: Insertion, biologic implant  Findings and Treatment:     Procedure: Mobilization of splenic flexure  Findings and Treatment:

## 2023-04-25 NOTE — DISCHARGE NOTE PROVIDER - CARE PROVIDER_API CALL
Zackary Dunaway; MANDA)  ColonRectal Surgery; [place]  [address] [place] [address]  Phone: [phone]  Fax: [phone]  Follow Up Time: 2 weeks

## 2023-04-25 NOTE — DISCHARGE NOTE PROVIDER - NSDCMRMEDTOKEN_GEN_ALL_CORE_FT
Adult Aspirin 81 mg oral tablet, chewable: 1 tab(s) orally once a day  allopurinol 300 mg oral tablet: 1 tab(s) orally once a day  amLODIPine 10 mg oral tablet: 1 tab(s) orally once a day  carvedilol 12.5 mg oral tablet: 1 tab(s) orally 2 times a day  ezetimibe 10 mg oral tablet: 1 tab(s) orally once a day  hydroCHLOROthiazide 12.5 mg oral tablet: 1 tab(s) orally once a day  medical marjuana:   oxycodone-acetaminophen 5 mg-325 mg oral tablet: 1 tab(s) orally every 6 hours as needed for  moderate pain MDD: 004  potassium chloride 10 mEq oral tablet, extended release: 1 tab(s) orally once a day  rosuvastatin 40 mg oral tablet: 1 tab(s) orally once a day  torsemide 20 mg oral tablet: 1 tab(s) orally once a day

## 2023-04-25 NOTE — DISCHARGE NOTE PROVIDER - NSDCFUSCHEDAPPT_GEN_ALL_CORE_FT
[facility]  [facility] [place]  Scheduled Appointment: 06/02/2023    Maciel Shah  [facility] [place]  Scheduled Appointment: 06/02/2023    Maciel Shah  [facility] [place]  Scheduled Appointment: 07/20/2023

## 2023-04-25 NOTE — DISCHARGE NOTE PROVIDER - HOSPITAL COURSE
Pt underwent an elective partial transverse colon resection for polyp. postop course he had an elevated creatinine which responded to IVF. pain controlled, tolerated diet and return of bowel function.

## 2023-04-25 NOTE — PROGRESS NOTE ADULT - SUBJECTIVE AND OBJECTIVE BOX
Pt seen at bedside resting comfortable  No acute events overnight  Pt not passing gas or BM yet  No nausea, vomiting, fever or chills    Vitals:  T(C): 37.3 (04-25 @ 00:24), Max: 37.3 (04-25 @ 00:24)  HR: 72 (04-25 @ 00:24) (55 - 78)  BP: 129/75 (04-25 @ 00:24) (113/62 - 152/84)  RR: 18 (04-25 @ 00:24) (12 - 25)  SpO2: 96% (04-25 @ 00:24) (95% - 99%)    04-24 @ 07:01  -  04-25 @ 05:02  --------------------------------------------------------  IN:    Other (mL): 1500 mL  Total IN: 1500 mL    OUT:    Voided (mL): 450 mL  Total OUT: 450 mL    Total NET: 1050 mL    Physical Exam:  General: AAOx3, Well developed, NAD  Chest: Normal respiratory effort  Heart: RRR  Abdomen: Soft, ND, appropriately tender, prevena in place  Neuro/Psych: No localized deficits. Normal spech, normal tone  Skin: Normal, no rashes, no lesions noted.   Extremities: Warm, well perfused, no edema, Pulses intact    Current Inpatient Medications:  acetaminophen     Tablet .. 650 milliGRAM(s) Oral every 6 hours PRN  acetaminophen   IVPB .. 1000 milliGRAM(s) IV Intermittent every 6 hours  allopurinol 300 milliGRAM(s) Oral daily  alvimopan 12 milliGRAM(s) Oral every 12 hours  amLODIPine   Tablet 10 milliGRAM(s) Oral daily  atorvastatin 80 milliGRAM(s) Oral at bedtime  carvedilol 12.5 milliGRAM(s) Oral every 12 hours  enoxaparin Injectable 40 milliGRAM(s) SubCutaneous every 24 hours  ketorolac   Injectable 30 milliGRAM(s) IV Push every 6 hours  lactated ringers. 1000 milliLiter(s) (125 mL/Hr) IV Continuous <Continuous>  ondansetron Injectable 4 milliGRAM(s) IV Push every 6 hours PRN  oxyCODONE    IR 5 milliGRAM(s) Oral every 4 hours PRN  oxyCODONE    IR 10 milliGRAM(s) Oral every 4 hours PRN  sodium chloride 0.9% lock flush 3 milliLiter(s) IV Push every 8 hours

## 2023-04-26 ENCOUNTER — TRANSCRIPTION ENCOUNTER (OUTPATIENT)
Age: 58
End: 2023-04-26

## 2023-04-26 VITALS
TEMPERATURE: 98 F | SYSTOLIC BLOOD PRESSURE: 103 MMHG | RESPIRATION RATE: 16 BRPM | HEART RATE: 98 BPM | DIASTOLIC BLOOD PRESSURE: 65 MMHG | OXYGEN SATURATION: 95 %

## 2023-04-26 LAB
ANION GAP SERPL CALC-SCNC: 4 MMOL/L — LOW (ref 5–17)
BUN SERPL-MCNC: 13 MG/DL — SIGNIFICANT CHANGE UP (ref 7–23)
CALCIUM SERPL-MCNC: 9 MG/DL — SIGNIFICANT CHANGE UP (ref 8.5–10.1)
CHLORIDE SERPL-SCNC: 108 MMOL/L — SIGNIFICANT CHANGE UP (ref 96–108)
CO2 SERPL-SCNC: 28 MMOL/L — SIGNIFICANT CHANGE UP (ref 22–31)
CREAT SERPL-MCNC: 1.12 MG/DL — SIGNIFICANT CHANGE UP (ref 0.5–1.3)
EGFR: 76 ML/MIN/1.73M2 — SIGNIFICANT CHANGE UP
GLUCOSE BLDC GLUCOMTR-MCNC: 112 MG/DL — HIGH (ref 70–99)
GLUCOSE BLDC GLUCOMTR-MCNC: 119 MG/DL — HIGH (ref 70–99)
GLUCOSE SERPL-MCNC: 113 MG/DL — HIGH (ref 70–99)
HCT VFR BLD CALC: 39.6 % — SIGNIFICANT CHANGE UP (ref 39–50)
HGB BLD-MCNC: 12.4 G/DL — LOW (ref 13–17)
MAGNESIUM SERPL-MCNC: 2 MG/DL — SIGNIFICANT CHANGE UP (ref 1.6–2.6)
MCHC RBC-ENTMCNC: 27.8 PG — SIGNIFICANT CHANGE UP (ref 27–34)
MCHC RBC-ENTMCNC: 31.3 GM/DL — LOW (ref 32–36)
MCV RBC AUTO: 88.8 FL — SIGNIFICANT CHANGE UP (ref 80–100)
PHOSPHATE SERPL-MCNC: 2.2 MG/DL — LOW (ref 2.5–4.5)
PLATELET # BLD AUTO: 237 K/UL — SIGNIFICANT CHANGE UP (ref 150–400)
POTASSIUM SERPL-MCNC: 3.3 MMOL/L — LOW (ref 3.5–5.3)
POTASSIUM SERPL-SCNC: 3.3 MMOL/L — LOW (ref 3.5–5.3)
RBC # BLD: 4.46 M/UL — SIGNIFICANT CHANGE UP (ref 4.2–5.8)
RBC # FLD: 16.4 % — HIGH (ref 10.3–14.5)
SODIUM SERPL-SCNC: 140 MMOL/L — SIGNIFICANT CHANGE UP (ref 135–145)
SURGICAL PATHOLOGY STUDY: SIGNIFICANT CHANGE UP
WBC # BLD: 12.49 K/UL — HIGH (ref 3.8–10.5)
WBC # FLD AUTO: 12.49 K/UL — HIGH (ref 3.8–10.5)

## 2023-04-26 PROCEDURE — 99232 SBSQ HOSP IP/OBS MODERATE 35: CPT

## 2023-04-26 RX ORDER — POTASSIUM CHLORIDE 20 MEQ
40 PACKET (EA) ORAL ONCE
Refills: 0 | Status: DISCONTINUED | OUTPATIENT
Start: 2023-04-26 | End: 2023-04-26

## 2023-04-26 RX ORDER — SODIUM,POTASSIUM PHOSPHATES 278-250MG
1 POWDER IN PACKET (EA) ORAL ONCE
Refills: 0 | Status: DISCONTINUED | OUTPATIENT
Start: 2023-04-26 | End: 2023-04-26

## 2023-04-26 RX ADMIN — CARVEDILOL PHOSPHATE 12.5 MILLIGRAM(S): 80 CAPSULE, EXTENDED RELEASE ORAL at 06:17

## 2023-04-26 RX ADMIN — Medication 300 MILLIGRAM(S): at 10:11

## 2023-04-26 RX ADMIN — SODIUM CHLORIDE 3 MILLILITER(S): 9 INJECTION INTRAMUSCULAR; INTRAVENOUS; SUBCUTANEOUS at 06:08

## 2023-04-26 RX ADMIN — OXYCODONE HYDROCHLORIDE 10 MILLIGRAM(S): 5 TABLET ORAL at 11:00

## 2023-04-26 RX ADMIN — OXYCODONE HYDROCHLORIDE 10 MILLIGRAM(S): 5 TABLET ORAL at 10:11

## 2023-04-26 RX ADMIN — AMLODIPINE BESYLATE 10 MILLIGRAM(S): 2.5 TABLET ORAL at 10:11

## 2023-04-26 NOTE — PROGRESS NOTE ADULT - SUBJECTIVE AND OBJECTIVE BOX
PCP:  Dr Callejas    C/C: sessile polyp    HPI: 58M, pmh of morbid Obesity, had gastric sleeve surgery in 2016, HTN, HLD, Pulmonary HTN, gout, and CHF, pt had a colonoscopy in October 2022 and was found to have a large Sessile polyp.  He was referred to an advanced GI specialist in March 2023 but they were also unable to removal the polyp.  Pt was referred to Dr Dunaway for colon resection.  He is now S/P Laparoscopic Partial Colectomy. Hospitalist service consulted for medical comanagement. Pt seen and examined this am. doing well. Tolerating po. Has some pain at incisional site especially when transferring out of bed.   No difficulty voiding. +Bms.     ROS: all 10 systems reviewed and is as above otherwise negative.     Vital Signs Last 24 Hrs  T(C): 36.8 (26 Apr 2023 08:00), Max: 37.1 (25 Apr 2023 16:00)  T(F): 98.2 (26 Apr 2023 08:00), Max: 98.8 (25 Apr 2023 16:00)  HR: 58 (26 Apr 2023 08:00) (58 - 75)  BP: 135/74 (26 Apr 2023 08:00) (115/56 - 137/88)  BP(mean): 103 (25 Apr 2023 16:00) (103 - 103)  RR: 16 (26 Apr 2023 08:00) (16 - 18)  SpO2: 95% (26 Apr 2023 08:00) (94% - 95%)    Parameters below as of 26 Apr 2023 08:00  Patient On (Oxygen Delivery Method): room air      PHYSICAL EXAM:    GENERAL: Comfortable, no acute distress   HEAD:  Normocephalic, atraumatic  EYES: EOMI, PERRLA  HEENT: Moist mucous membranes  NECK: Supple, No JVD  NERVOUS SYSTEM:  A+OX3, non focal.   CHEST/LUNG: Clear to auscultation bilaterally  HEART: Regular rate and rhythm  ABDOMEN: Soft, obese,  lap sites c/d/i , + pervena, BS+,   GENITOURINARY: Voiding, no palpable bladder  EXTREMITIES:   No clubbing, cyanosis, or edema  MUSCULOSKELETAL- No muscle tenderness, no joint tenderness  SKIN-no rash      LABS:                        12.4   12.49 )-----------( 237      ( 26 Apr 2023 08:11 )             39.6     04-26    140  |  108  |  13  ----------------------------<  113<H>  3.3<L>   |  28  |  1.12    Ca    9.0      26 Apr 2023 08:11  Phos  2.2     04-26  Mg     2.0     04-26    MEDICATIONS  (STANDING):  allopurinol 300 milliGRAM(s) Oral daily  amLODIPine   Tablet 10 milliGRAM(s) Oral daily  atorvastatin 80 milliGRAM(s) Oral at bedtime  carvedilol 12.5 milliGRAM(s) Oral daily  enoxaparin Injectable 40 milliGRAM(s) SubCutaneous every 24 hours  potassium chloride    Tablet ER 40 milliEquivalent(s) Oral once  potassium phosphate / sodium phosphate Powder (PHOS-NaK) 1 Packet(s) Oral once  sodium chloride 0.9% lock flush 3 milliLiter(s) IV Push every 8 hours    MEDICATIONS  (PRN):  acetaminophen     Tablet .. 650 milliGRAM(s) Oral every 6 hours PRN Temp greater or equal to 38C (100.4F), Mild Pain (1 - 3)  ondansetron Injectable 4 milliGRAM(s) IV Push every 6 hours PRN Nausea and/or Vomiting  oxyCODONE    IR 5 milliGRAM(s) Oral every 4 hours PRN Moderate Pain (4 - 6)  oxyCODONE    IR 10 milliGRAM(s) Oral every 4 hours PRN Severe Pain (7 - 10)      ASSESSMENT AND PLAN:  58m, PMH as above a/w:    # large sessile Polyp  #S/P laparascopic partial colectomy, POD#2  -tolerating po  -pain control  -ambulate  -incentive spirometry  -+bowel function.     #Leukocytosis  likely reactive  no s/s of infn, afebrile    #Hypokalemia/hypophosphatemia:  -replete    #HTN/PULM HTN/ HFpEF   last echo in 2022 LVEF 50%  can resume diuretics on dc.   cont amlodipine, carvedilol    #ORTEGA   -resolved with ivf.   -likely prerenal    #Non obstructive CAD  cont bb, statin  resume asa on dc if ok with crs    # HLD  cont statin    #Gout  cont allopurinol    # morbid obesity   dietary/lifestyle modifications    #VTE prophylaxis  lovenox

## 2023-04-26 NOTE — PROGRESS NOTE ADULT - ASSESSMENT
59 yo male with pmh: morbid Obesity, had gastric sleeve surgery in 2016, HTN, HLD, Pulmonary HTN, gout, and CHF, pt had a colonoscopy in October 2022 and was found to have a large Sessile polyp. Pt is s/p S/P laparascopic partial colectomy POD2    Plan:    FLD, ADAT  pain control  ambulation  PT assessment  IVF  nausea control  poss discharge today    Case discussed with colorectal surgery team  
59 yo male with pmh: morbid Obesity, had gastric sleeve surgery in 2016, HTN, HLD, Pulmonary HTN, gout, and CHF, pt had a colonoscopy in October 2022 and was found to have a large Sessile polyp.  Pt is s/p S/P laparascopic partial colectomy POD1    Plan:    CLD  pain control  ambulation  PT assessment  IVF  nausea control    Case discussed with colorectal surgery team

## 2023-04-26 NOTE — DISCHARGE NOTE NURSING/CASE MANAGEMENT/SOCIAL WORK - PATIENT PORTAL LINK FT
You can access the FollowMyHealth Patient Portal offered by [facility] by registering at the following website: [email or web address]. By joining [facility]’s FollowMyHealth portal, you will also be able to view your health information using other applications (apps) compatible with our system.

## 2023-04-26 NOTE — PROGRESS NOTE ADULT - SUBJECTIVE AND OBJECTIVE BOX
Pt seen at bedside resting comfortable  No acute events overnight  No nausea, vomiting, fever or chills      Physical Exam:  General: AAOx3, Well developed, NAD  Chest: Normal respiratory effort  Heart: RRR  Abdomen: Soft, ND, appropriately tender, prevena in place  Neuro/Psych: No localized deficits. Normal spech, normal tone  Skin: Normal, no rashes, no lesions noted.   Extremities: Warm, well perfused, no edema, Pulses intact        MEDICATIONS  (STANDING):  allopurinol 300 milliGRAM(s) Oral daily  alvimopan 12 milliGRAM(s) Oral every 12 hours  amLODIPine   Tablet 10 milliGRAM(s) Oral daily  atorvastatin 80 milliGRAM(s) Oral at bedtime  carvedilol 12.5 milliGRAM(s) Oral daily  enoxaparin Injectable 40 milliGRAM(s) SubCutaneous every 24 hours  lactated ringers. 1000 milliLiter(s) (125 mL/Hr) IV Continuous <Continuous>  sodium chloride 0.9% lock flush 3 milliLiter(s) IV Push every 8 hours    MEDICATIONS  (PRN):  acetaminophen     Tablet .. 650 milliGRAM(s) Oral every 6 hours PRN Temp greater or equal to 38C (100.4F), Mild Pain (1 - 3)  ondansetron Injectable 4 milliGRAM(s) IV Push every 6 hours PRN Nausea and/or Vomiting  oxyCODONE    IR 5 milliGRAM(s) Oral every 4 hours PRN Moderate Pain (4 - 6)  oxyCODONE    IR 10 milliGRAM(s) Oral every 4 hours PRN Severe Pain (7 - 10)      PAST MEDICAL & SURGICAL HISTORY:  Asthma      Hypertension      Hyperlipemia      H/O CHF      H/O pulmonary hypertension      Gout      Moderate obstructive sleep apnea      Polyp of transverse colon      H/O cardiac catheterization      S/P colonoscopy      H/O gastric sleeve          Vital Signs Last 24 Hrs  T(C): 36.7 (26 Apr 2023 04:32), Max: 37.1 (25 Apr 2023 16:00)  T(F): 98.1 (26 Apr 2023 04:32), Max: 98.8 (25 Apr 2023 16:00)  HR: 66 (26 Apr 2023 04:32) (55 - 75)  BP: 129/71 (26 Apr 2023 04:32) (115/56 - 138/76)  BP(mean): 103 (25 Apr 2023 16:00) (103 - 103)  RR: 17 (26 Apr 2023 04:32) (17 - 18)  SpO2: 95% (26 Apr 2023 04:32) (94% - 97%)    Parameters below as of 26 Apr 2023 04:32  Patient On (Oxygen Delivery Method): room air        I&O's Summary    24 Apr 2023 07:01  -  25 Apr 2023 07:00  --------------------------------------------------------  IN: 3190 mL / OUT: 450 mL / NET: 2740 mL    25 Apr 2023 07:01  -  26 Apr 2023 05:47  --------------------------------------------------------  IN: 2640 mL / OUT: 1100 mL / NET: 1540 mL                              12.8   12.87 )-----------( 243      ( 25 Apr 2023 07:49 )             41.2     04-25    136  |  108  |  22  ----------------------------<  134<H>  3.6   |  25  |  1.72<H>    Ca    9.0      25 Apr 2023 07:49  Phos  2.8     04-25  Mg     1.9     04-25

## 2023-04-26 NOTE — PROGRESS NOTE ADULT - ATTENDING COMMENTS
S&E  No complaints.  No flatus yet but feels impending.  Miya full liquids.  AFVSS  NAD  soft, approp tender, mild distention  Labs reviewed Cr 1.7 from 1.2  A/P -   Cont current diet. Await further bowel function.  IVF for mild elevation in Cr.   OOB and ambulation.
Patient seen and evaluated at bedside  Patient is doing well, tolerating a diet, having bowel function  Denies N/V  Abd soft, AT, ND  Plan for discharge home today with follow up in 2 weeks    Vital Signs Last 24 Hrs  T(C): 36.8 (26 Apr 2023 08:00), Max: 37.1 (25 Apr 2023 16:00)  T(F): 98.2 (26 Apr 2023 08:00), Max: 98.8 (25 Apr 2023 16:00)  HR: 58 (26 Apr 2023 08:00) (58 - 75)  BP: 135/74 (26 Apr 2023 08:00) (115/56 - 137/88)  BP(mean): 103 (25 Apr 2023 16:00) (103 - 103)  RR: 16 (26 Apr 2023 08:00) (16 - 18)  SpO2: 95% (26 Apr 2023 08:00) (94% - 95%)    Parameters below as of 26 Apr 2023 08:00  Patient On (Oxygen Delivery Method): room air                          12.4   12.49 )-----------( 237      ( 26 Apr 2023 08:11 )             39.6

## 2023-04-28 DIAGNOSIS — E83.39 OTHER DISORDERS OF PHOSPHORUS METABOLISM: ICD-10-CM

## 2023-04-28 DIAGNOSIS — N17.9 ACUTE KIDNEY FAILURE, UNSPECIFIED: ICD-10-CM

## 2023-04-28 DIAGNOSIS — E66.01 MORBID (SEVERE) OBESITY DUE TO EXCESS CALORIES: ICD-10-CM

## 2023-04-28 DIAGNOSIS — Z98.61 CORONARY ANGIOPLASTY STATUS: ICD-10-CM

## 2023-04-28 DIAGNOSIS — G47.33 OBSTRUCTIVE SLEEP APNEA (ADULT) (PEDIATRIC): ICD-10-CM

## 2023-04-28 DIAGNOSIS — Z98.84 BARIATRIC SURGERY STATUS: ICD-10-CM

## 2023-04-28 DIAGNOSIS — M10.9 GOUT, UNSPECIFIED: ICD-10-CM

## 2023-04-28 DIAGNOSIS — E86.1 HYPOVOLEMIA: ICD-10-CM

## 2023-04-28 DIAGNOSIS — I50.32 CHRONIC DIASTOLIC (CONGESTIVE) HEART FAILURE: ICD-10-CM

## 2023-04-28 DIAGNOSIS — I27.20 PULMONARY HYPERTENSION, UNSPECIFIED: ICD-10-CM

## 2023-04-28 DIAGNOSIS — I25.10 ATHEROSCLEROTIC HEART DISEASE OF NATIVE CORONARY ARTERY WITHOUT ANGINA PECTORIS: ICD-10-CM

## 2023-04-28 DIAGNOSIS — J45.909 UNSPECIFIED ASTHMA, UNCOMPLICATED: ICD-10-CM

## 2023-04-28 DIAGNOSIS — E87.6 HYPOKALEMIA: ICD-10-CM

## 2023-04-28 DIAGNOSIS — I13.0 HYPERTENSIVE HEART AND CHRONIC KIDNEY DISEASE WITH HEART FAILURE AND STAGE 1 THROUGH STAGE 4 CHRONIC KIDNEY DISEASE, OR UNSPECIFIED CHRONIC KIDNEY DISEASE: ICD-10-CM

## 2023-04-28 DIAGNOSIS — D12.3 BENIGN NEOPLASM OF TRANSVERSE COLON: ICD-10-CM

## 2023-04-28 DIAGNOSIS — N18.31 CHRONIC KIDNEY DISEASE, STAGE 3A: ICD-10-CM

## 2023-04-28 DIAGNOSIS — E78.5 HYPERLIPIDEMIA, UNSPECIFIED: ICD-10-CM

## 2023-05-10 ENCOUNTER — APPOINTMENT (OUTPATIENT)
Dept: COLORECTAL SURGERY | Facility: CLINIC | Age: 58
End: 2023-05-10
Payer: MEDICARE

## 2023-05-10 VITALS
HEART RATE: 87 BPM | SYSTOLIC BLOOD PRESSURE: 124 MMHG | OXYGEN SATURATION: 98 % | RESPIRATION RATE: 14 BRPM | WEIGHT: 315 LBS | HEIGHT: 71 IN | BODY MASS INDEX: 44.1 KG/M2 | DIASTOLIC BLOOD PRESSURE: 81 MMHG | TEMPERATURE: 97.4 F

## 2023-05-10 DIAGNOSIS — Z09 ENCOUNTER FOR FOLLOW-UP EXAMINATION AFTER COMPLETED TREATMENT FOR CONDITIONS OTHER THAN MALIGNANT NEOPLASM: ICD-10-CM

## 2023-05-10 PROCEDURE — 99024 POSTOP FOLLOW-UP VISIT: CPT

## 2023-05-10 NOTE — HISTORY OF PRESENT ILLNESS
[FreeTextEntry1] : Here for POV after undergoing lap transverse colectomy for persistent polyp on 4/24/23. Miya po with some epigastric discomfort. Passing stools regularly. Energy levels are not yet to baseline.

## 2023-05-10 NOTE — ASSESSMENT
[FreeTextEntry1] : Here for postop visit after undergoing laparoscopic partial colectomy for persistent transverse colon polyp on 4/24/2023.  Overall, doing well.  Advised to return to regular diet including high-fiber foods.  Can gradually increase activities.  Reminded to have repeat colonoscopy with GI in 1 year.  Follow-up as needed.

## 2023-05-10 NOTE — PHYSICAL EXAM
[No Rash or Lesion] : No rash or lesion [Alert] : alert [Oriented to Person] : oriented to person [Oriented to Place] : oriented to place [Oriented to Time] : oriented to time [Calm] : calm [de-identified] : incision CDI, soft, NTND [de-identified] : No apparent distress [de-identified] : Normocephalic atraumatic [de-identified] : Moving all extremities x4

## 2023-06-02 ENCOUNTER — APPOINTMENT (OUTPATIENT)
Dept: PULMONOLOGY | Facility: CLINIC | Age: 58
End: 2023-06-02

## 2023-06-22 ENCOUNTER — OFFICE (OUTPATIENT)
Dept: URBAN - METROPOLITAN AREA CLINIC 6 | Facility: CLINIC | Age: 58
Setting detail: OPHTHALMOLOGY
End: 2023-06-22
Payer: MEDICARE

## 2023-06-22 DIAGNOSIS — H40.013: ICD-10-CM

## 2023-06-22 DIAGNOSIS — H52.4: ICD-10-CM

## 2023-06-22 PROBLEM — H52.7 REFRACTIVE ERROR ; BOTH EYES: Status: ACTIVE | Noted: 2023-06-22

## 2023-06-22 PROCEDURE — 92015 DETERMINE REFRACTIVE STATE: CPT

## 2023-06-22 PROCEDURE — 92004 COMPRE OPH EXAM NEW PT 1/>: CPT

## 2023-06-22 PROCEDURE — 92133 CPTRZD OPH DX IMG PST SGM ON: CPT

## 2023-06-22 ASSESSMENT — REFRACTION_CURRENTRX
OS_CYLINDER: -2.50
OD_CYLINDER: -2.25
OS_AXIS: 095
OS_SPHERE: -0.25
OD_VPRISM_DIRECTION: SV
OS_VPRISM_DIRECTION: SV
OD_AXIS: 080
OD_SPHERE: -1.00
OD_OVR_VA: 20/
OS_OVR_VA: 20/

## 2023-06-22 ASSESSMENT — KERATOMETRY
OD_K1POWER_DIOPTERS: 43.00
METHOD_AUTO_MANUAL: AUTO
OS_K1POWER_DIOPTERS: 42.25
OD_K2POWER_DIOPTERS: 45.00
OD_AXISANGLE_DEGREES: 180
OS_K2POWER_DIOPTERS: 44.25
OS_AXISANGLE_DEGREES: 180

## 2023-06-22 ASSESSMENT — REFRACTION_MANIFEST
OS_SPHERE: PLANO
OD_AXIS: 085
OD_SPHERE: -0.75
OS_VA1: 20/25+
OD_VA1: 20/25
OS_AXIS: 085
OS_ADD: +2.00
OD_CYLINDER: -2.25
OD_ADD: +2.00
OS_CYLINDER: -2.25

## 2023-06-22 ASSESSMENT — REFRACTION_AUTOREFRACTION
OD_CYLINDER: -2.75
OS_CYLINDER: -2.50
OS_SPHERE: -0.25
OD_SPHERE: -0.75
OD_AXIS: 095
OS_AXIS: 085

## 2023-06-22 ASSESSMENT — SPHEQUIV_DERIVED
OD_SPHEQUIV: -1.875
OS_SPHEQUIV: -1.5
OD_SPHEQUIV: -2.125

## 2023-06-22 ASSESSMENT — VISUAL ACUITY
OD_BCVA: 20/30+2
OS_BCVA: 20/25-2

## 2023-06-22 ASSESSMENT — AXIALLENGTH_DERIVED
OS_AL: 24.2881
OD_AL: 24.2542
OD_AL: 24.1517

## 2023-06-22 ASSESSMENT — CONFRONTATIONAL VISUAL FIELD TEST (CVF)
OS_FINDINGS: FULL
OD_FINDINGS: FULL

## 2023-07-20 ENCOUNTER — APPOINTMENT (OUTPATIENT)
Dept: PULMONOLOGY | Facility: CLINIC | Age: 58
End: 2023-07-20
Payer: MEDICARE

## 2023-07-20 VITALS
BODY MASS INDEX: 44.1 KG/M2 | SYSTOLIC BLOOD PRESSURE: 126 MMHG | HEART RATE: 71 BPM | RESPIRATION RATE: 16 BRPM | OXYGEN SATURATION: 97 % | DIASTOLIC BLOOD PRESSURE: 78 MMHG | WEIGHT: 315 LBS | HEIGHT: 71 IN

## 2023-07-20 PROCEDURE — 99214 OFFICE O/P EST MOD 30 MIN: CPT

## 2023-07-20 NOTE — HISTORY OF PRESENT ILLNESS
[Good Compliance] : good compliance with treatment [Good Tolerance] : good tolerance of treatment [Good Symptom Control] : good symptom control [Follow-Up - Routine Clinic] : a routine clinic follow-up of [Excess Weight] : excess weight [Inability to Lose Weight] : inability to lose weight [Dyspnea] : dyspnea [Back Pain] : back pain [Low Calorie Diet] : low calorie diet [Exercise Regimen] : exercise regimen [Fair Compliance] : fair compliance with treatment [Fair Tolerance] : fair tolerance of treatment [Fair Symptom Control] : fair symptom control [Sleep Apnea] : sleep apnea [High] : high [Low Calorie] : low calorie [On ___] : performed on [unfilled] [Patient] : the patient [To Assess ___] : to assess [unfilled] [None] : no new symptoms reported [TextBox_4] : H/o occasional wheeze and SOB especially with exertion; improved with Advair which he is now only using as needed.\par Pt was lost to f/u for 3 years\par Pt with CXR with ? RLL infiltrate vs atelectasis with elevated R hemidiaphragm with impaired motion but no paralysis\par He was previously hospitalized for SOB and was thought to have CHF. He reports multiple CHF exac and feels that this is caused by his CPAP therapy though I explained to him that CPAP should help CHF and his underlying cardiac function so refuses to use any more. He has an oral appliance which he apparently uses on occasion.\par He is very frustrated with his condition which I explained would like improve significantly with weight loss given his BMI>46. \par He also has restriction on PFTs which also may be weight related. He had lost weight after gastric sleeve surgery in 2016 but has gained most of it back. Prior imaging studies revealed only mild basilar atelectasis and possible pulm edema. \par W/u for elevated hemidiaphragm revealed impaired motion but no paralysis of the hemidiaphragm.\par S/p colonoscopy and polypectomy.\par  [de-identified] : AutoCPAP [Diabetes] : no diabetes [Hypertension] : no hypertension [FreeTextEntry9] : CXR [FreeTextEntry8] : Mildly elevated R hemidiaphragm with ? infiltrate or compressive atelectasis

## 2023-07-20 NOTE — CONSULT LETTER
[Dear  ___] : Dear  [unfilled], [Consult Letter:] : I had the pleasure of evaluating your patient, [unfilled]. [Please see my note below.] : Please see my note below. [Sincerely,] : Sincerely, [Consult Closing:] : Thank you very much for allowing me to participate in the care of this patient.  If you have any questions, please do not hesitate to contact me. [DrMary  ___] : Dr. MONROE [FreeTextEntry3] : Maciel Shah MD, FCCP, D. ABSM\par Pulmonary and Sleep Medicine\par [facility] [place]

## 2023-07-20 NOTE — DISCUSSION/SUMMARY
[FreeTextEntry1] : \par #1. Diet and exercise for weight loss\par #2. Could consider Advair but for now will continue as needed ProAir for wheeze and exertional SOB; pt improved clinically without chronic BD therapy\par #3. Exertional SOB and restrictive pattern on spirometry are likely weight related; follow lung function intermittently if pt allows\par #4. Prior CXR from 2018 was clear by report and repeat with mildly elevated R hemidiaphragm with ? RLL infiltrate vs atelectasis; xray fluoroscopy to evaluate possible paralysis of R hemidiaphragm revealed no paradoxical motion but with moderately impaired motion of unclear etiology; could consider neurologic evaluation but he has not been interested; repeat fluoroscopy for any worsening of R hemidiaphragm\par #5. Rec AutoCPAP 7-16 cm of water for moderate JESSICA which had improved from previous with weight loss; encouraged compliance with CPAP; The patient is using and benefitting from CPAP therapy \par #6.  Recommended second pulmonary opinion in the past since he seemed frustrated with his current condition and lack of improvement despite therapy but now appears to be better and more accepting of his condition\par #7. Consider eventual MCT to r/o asthma if intermittent SOB and wheeze persist but pt is not interested\par #8. Rec that lung function should be followed periodically when pt is willing\par #9. Consider bariatric surgery re-evaluation given weight gain; also recommended nutritionist lawrence; info given to pt previously; he is willing to consider surgical options if unable to lose weight on his own\par #10. Replace equipment as needed if uses CPAP; gave and ordered AirFit [id number] mask previously; ordered equipment 4/21/23 with new FFM per pt preference\par #11. F/u with obesity medicine for weight loss\par #12. Recommended Covid vaccines; s/p one Covid vaccine\par #13. Pt now more willing to f/u and follow recommendations\par \par The patient expressed understanding and agreement with the above recommendations/plan and accepts responsibility to be compliant with recommended testing, therapies, and f/u visits.\par All relevant questions and concerns were addressed.

## 2023-07-27 ENCOUNTER — OFFICE (OUTPATIENT)
Dept: URBAN - METROPOLITAN AREA CLINIC 6 | Facility: CLINIC | Age: 58
Setting detail: OPHTHALMOLOGY
End: 2023-07-27

## 2023-07-27 ENCOUNTER — APPOINTMENT (OUTPATIENT)
Dept: GASTROENTEROLOGY | Facility: CLINIC | Age: 58
End: 2023-07-27
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
## 2023-07-27 PROCEDURE — NO SHOW FE NO SHOW FEE

## 2023-09-26 NOTE — PATIENT PROFILE ADULT - VISION (WITH CORRECTIVE LENSES IF THE PATIENT USUALLY WEARS THEM):
Partially impaired: cannot see medication labels or newsprint, but can see obstacles in path, and the surrounding layout; can count fingers at arm's length Xenograft Text: The defect edges were debeveled with a #15 scalpel blade.  Given the location of the defect, shape of the defect and the proximity to free margins a xenograft was deemed most appropriate.  The graft was then trimmed to fit the size of the defect.  The graft was then placed in the primary defect and oriented appropriately.

## 2023-11-29 NOTE — HISTORY OF PRESENT ILLNESS
[facility] AMB[facility]ATORY ENCOUNTER  FAMILY PRACTICE OFFICE VISIT    CHIEF COMPLAINT  Chief Complaint   Patient presents with   • Follow-up     Diabetes        HISTORY    Anup Duarte is a 73 year old male who presents for follow-up.      Depression  During previous office visit patient reported feelings of sadness and depression since his son and daughter  and he was crying a lot.  He was started on sertraline 25 mg daily.  Today, patient reports signs and symptoms have greatly improved.  He is not crying anymore, but still thinks about them.  He would like to continue the same sertraline dose.  Denies manic/hypomanic symptoms. No family history of bipolar disorder. Denies recurrent thoughts of death or suicidal ideation, or a suicide attempt.     Diabetes  Presents for repeat evaluation of diabetes. He was last seen for this within the past 6 months.  Present treatment includes insulin glargine 15 units b.i.d, however patient is only injecting daily.  The patient denies any associated symptoms including chest pain, shortness of breath, headaches, dizziness or sweats. He does not have symptoms of hypoglycemia. Denies polyuria, polydipsia and skin breakdown and infection.       CHOLESTEROL MANAGEMENT:    The patient is presently on statin therapy and will continue as prescribed.     WEIGHT MANAGEMENT:    Body mass index is 26.43 kg/m².    BMI ASSESSMENT/PLAN:  Discussed importance of healthy diet and exercise     INTERVAL DIABETIC SCREENINGS:    Urine microalbumin done within the past year and is up to date.  Patient is on dialysis.  Diabetic foot exam:   completed today.  Diabetic eye exam:   patient will schedule as soon as possible.    Pt came in today accompanied by:  Ex-wife. Visit performed in their presence with permission from the patient.     MEDICAL HISTORY    Past Medical History:   Diagnosis Date   • Aneurysm of iliac artery (CMD) 2016   • Benign hypertension    • BPPV (benign paroxysmal  [FreeTextEntry1] : In 2016 as advised he completed a sleep study which showed moderate sleep apnea with an overall  AHI of 24.7 events/HR\par No longer has a Auto CPAP mask and has not had pulm f/u\par In June of 2018 he was hospitalized for chest pain where a cardiac catheterization was performed revealing no CAD , PA pressures of 63/27 and a wedge pressure of 29.\par  \par In hospital  echocardiogram revealed a decreased EF 40-45% with mildly decreased left ventricular systolic function and mild left ventricular hypertrophy.\par \par There is a history of bariatric surgery with a total weight loss of 50 lbs.\par \par Blood work collected in June of 2019 Chol. 299, , Tri. 191 and started on Atorvastatin 40 mg  soon after by PCP Dr. Winn\par \par  positional vertigo) 07/27/2016   • Cataract 04/02/2015   • Chronic pain    • Cigarette smoker    • Colon polyp 07/19/2018    Tubular Adenoma/ Dr. Bhardwaj   • DDD (degenerative disc disease), cervical 06/07/2017   • DDD (degenerative disc disease), lumbar 06/07/2017   • Diabetes mellitus (CMD)    • Dyslipidemia    • Elevated PSA 02/09/2018   • ESRD (end stage renal disease) on dialysis (CMD)    • Essential (primary) hypertension    • Fatty liver    • Fibrosis of lung (CMD) 06/07/2017   • LOPEZ (generalized anxiety disorder)    • Heart failure (CMD)    • Heart murmur    • Helicobacter pylori gastritis 08/30/2012   • History of depression 2016   • History of Helicobacter pylori infection 07/27/2016   • History of renal dialysis     port to chest w/dialysis on tues, Thurs & Sat   • Hypertensive cardiomyopathy (CMD) 06/07/2017   • Impotence    • Literacy level of illiterate 01/05/2019   • Metabolic syndrome 02/21/2019   • S/P dialysis catheter insertion (CMD)     right IJ PermCath in place   • Sleep disorder 09/09/2020   • Tubular adenoma of colon 05/10/2019   • Type 2 diabetes mellitus with diabetic nephropathy (CMD) 08/30/2016       SURGICAL HISTORY    Past Surgical History:   Procedure Laterality Date   • Cardiac catherization  2007   • Colonoscopy w/ biopsies and polypectomy  03/11/2010    1. Colonic polyps 2. Mild sigmoid diverticulosis 3. Mild internal hemorrhoids   • Colonoscopy w/ biopsies and polypectomy  09/16/2010    1. Colonic polyps 2. Mild internal hemorrhoids   • Colonoscopy w/ biopsies and polypectomy  09/10/2013    1. Colonoscopy done in patient with a history of colon polyps demonstrating 8 polyps, 1 large in size. Plan. Repeat colonoscopy in 3 years time. Supposed to returnin 2017   • Colonoscopy w/ biopsies and polypectomy  07/19/2018    6 Month Recall/ Multiple Tubular Adenoma Removed/ Dr. Bhardwaj   • Coronary angiogram/possible ptca - cv  08/25/2023   • Ir kidney biopsy  04/08/2019   • Tonsillectomy     •  Upper gi endoscopy,exam  07/01/2012       SOCIAL HISTORY    Social History     Tobacco Use   • Smoking status: Every Day     Current packs/day: 1.00     Average packs/day: 1 pack/day for 60.1 years (60.1 ttl pk-yrs)     Types: Cigarettes     Start date: 10/31/1963   • Smokeless tobacco: Current   Vaping Use   • Vaping Use: never used   Substance Use Topics   • Alcohol use: No     Alcohol/week: 0.0 standard drinks of alcohol   • Drug use: No       FAMILY HISTORY    Family History   Problem Relation Age of Onset   • Cancer Mother         colon cancer 78 when diagnosed   • Hypertension Mother    • Diabetes Sister    • Cancer Sister         breast cancer    • Asthma Sister    • Osteoarthritis Sister    • Depression Sister    • Diabetes Brother    • Patient is unaware of any medical problems Daughter    • Patient is unaware of any medical problems Daughter    • Patient is unaware of any medical problems Son    • Patient is unaware of any medical problems Son    • Patient is unaware of any medical problems Son    • Patient is unaware of any medical problems Son    • Patient is unaware of any medical problems Son        MEDICATIONS    Current Outpatient Medications   Medication Sig   • Insulin Glargine Solostar 100 UNIT/ML Solution Pen-injector Inject 15 Units into the skin every 12 hours. Prime 2 units before each dose.   • sertraline (ZOLOFT) 25 MG tablet Take 1 tablet by mouth daily.   • electrolyte/PEG 3350 (Nulytely with Flavor Packs) 420 g solution Take as directed per Colonoscopy prep letter instructions   • amLODIPine (NORVASC) 5 MG tablet Take 1 tablet by mouth daily.   • Lidocaine HCl 4 % Ointment    • Methoxy PEG-Epoetin Beta (MIRCERA IJ) 30 mcg.   • Methoxy PEG-Epoetin Beta (MIRCERA IJ) 50 mcg.   • Methoxy PEG-Epoetin Beta (MIRCERA IJ) 100 mcg.   • sevelamer carbonate (Renvela) 800 MG tablet Take 1 tablet by mouth in the morning and 1 tablet at noon and 1 tablet in the evening.   • Tuberculin PPD (TUBERSOL ID)  Inject 0.1 mLs into the skin.   • sevelamer carbonate (RENVELA) 800 MG tablet Take 1 tablet by mouth in the morning and 1 tablet at noon and 1 tablet in the evening.   • fluticasone-umeclidin-vilanterol (Trelegy Ellipta) 200-62.5-25 MCG/ACT inhaler Inhale 1 puff into the lungs daily.   • fluticasone (FLONASE) 50 MCG/ACT nasal spray Spray 2 sprays in each nostril daily.   • albuterol 108 (90 Base) MCG/ACT inhaler Inhale 2 puffs into the lungs every 4 hours as needed for Shortness of Breath or Wheezing (wheezing).   • predniSONE (DELTASONE) 20 MG tablet Take 2 tablets by mouth daily.   • Dextromethorphan-guaiFENesin 5-50 MG/5ML Syrup Take 5 mLs by mouth 4 times daily as needed (cough).   • metoPROLOL tartrate (LOPRESSOR) 50 MG tablet Take 1 tablet by mouth in the morning and 1 tablet at noon and 1 tablet in the evening.   • hydrALAZINE (APRESOLINE) 25 MG tablet Take 1 tablet by mouth in the morning and 1 tablet at noon and 1 tablet in the evening.   • atorvastatin (LIPITOR) 20 MG tablet Take 1 tablet by mouth nightly.   • Insulin Pen Needle (TechLite Pen Needles) 31G X 6 MM Misc Use three times daily with insulin   • blood glucose meter Test blood sugar 2 times daily as directed. Meter:  Pharmacist choice   • blood glucose test strip Test blood sugar 2 times daily as directed.   • blood glucose lancets Test blood sugar 2 times daily as directed.   • Cholecalciferol 50 mcg (2,000 units) capsule Take 1 capsule by mouth daily.   • ammonium lactate (AMLACTIN) 12 % lotion Apply topically as needed for dry skin   • melatonin 3 MG Take 2 tablets by mouth nightly.   • lidocaine (XYLOCAINE) 5 % ointment Apply topically as needed for Pain.   • tamsulosin (FLOMAX) 0.4 MG Cap Take 1 capsule by mouth daily after a meal.     No current facility-administered medications for this visit.       ALLERGIES    ALLERGIES:   Allergen Reactions   • Codeine HEADACHES       I  have reviewed the past medical history, family history, social  history, medications and allergies listed in the medical record, as obtained by nursing and support staff, and agree with their documentation.    LABS  Lab Results   Component Value Date    SODIUM 139 08/25/2023    POTASSIUM 3.1 (L) 08/25/2023    CHLORIDE 105 08/25/2023    CO2 31 08/25/2023    BUN 11 08/25/2023    CREATININE 3.01 (H) 08/25/2023    GLUCOSE 79 08/25/2023     Lab Results   Component Value Date    WBC 6.6 08/22/2023    HCT 35.6 (L) 08/22/2023    HGB 11.3 (L) 08/22/2023     08/22/2023     Hemoglobin A1C (%)   Date Value   12/14/2022 6.8 (H)     TSH (mcUnits/mL)   Date Value   12/14/2022 0.076 (L)     Lab Results   Component Value Date    CHOLESTEROL 95 08/22/2023    HDL 29 (L) 08/22/2023    CALCLDL 33 08/22/2023    TRIGLYCERIDE 163 (H) 08/22/2023       REVIEW OF SYSTEMS    In addition to already reviewed in the HPI:   Review of Systems   Constitutional: Negative for appetite change, chills, diaphoresis, fever and unexpected weight change.   Respiratory: Negative for cough, chest tightness, shortness of breath and wheezing.    Cardiovascular: Negative for chest pain, palpitations and leg swelling.   Gastrointestinal: Negative for abdominal distention, abdominal pain, blood in stool, constipation, diarrhea, nausea and vomiting.   Skin: Negative for color change, rash and wound.   Neurological: Negative for dizziness, weakness, numbness and headaches.       PHYSICAL EXAM    Vital Signs:    Vitals:    11/29/23 1333   BP: 108/62   Pulse: 62   Resp: 18   Temp: 98.9 °F (37.2 °C)   TempSrc: Oral   SpO2: 98%   Weight: 72 kg (158 lb 13.5 oz)   Height: 5' 5\" (1.651 m)     Physical Exam  Constitutional:       General: He is not in acute distress.     Appearance: He is well-developed. He is not diaphoretic.   HENT:      Head: Normocephalic and atraumatic.      Right Ear: External ear normal.      Left Ear: External ear normal.   Neck:      Thyroid: No thyromegaly.   Cardiovascular:      Rate and Rhythm: Normal  rate.      Heart sounds: Normal heart sounds.   Pulmonary:      Effort: Pulmonary effort is normal.      Breath sounds: Normal breath sounds.   Abdominal:      General: Bowel sounds are normal.      Palpations: Abdomen is soft. There is no mass.      Tenderness: There is no abdominal tenderness.   Musculoskeletal:         General: No tenderness.      Cervical back: Normal range of motion and neck supple.   Skin:     General: Skin is warm and dry.      Coloration: Skin is not pale.      Findings: No erythema or rash.   Neurological:      Mental Status: He is alert and oriented to person, place, and time.      Coordination: Coordination normal.   Psychiatric:         Behavior: Behavior normal.         Diabetic Foot Exam Documentation   Normal Bilateral Foot Exam:  Skin integrity is normal. Dorsalis pedis and posterior tibial pulses are present.  Pressure sensation using the [place] monofilament is present.    ASSESSMENT & PLAN  Type 2 diabetes mellitus with diabetic nephropathy, with long-term current use of insulin (CMD)  Last A1C 6.5.  Today 6.9. Overall course of diabetes is well controlled. Currently asymptomatic. Denies hypoglycemic episodes. Goal of treatment is HbA1c <7.0.  Will continue insulin gargling 15 units daily. Discussed with the patient things he can do to improve his blood sugar.     - insulin glargine (Lantus SoloStar) 100 UNIT/ML pen-injector; Inject 15 Units into the skin nightly. Prime 2 units before each dose.  Dispense: 15 mL; Refill: 11  - POCT Glycohemoglobin Analyzer    Moderate major depression (CMD)  Depression signs and symptoms have greatly improved with sertraline 25 mg.  Patient declined any need to increase dose at this time.  Also declines need for referral to Behavioral Health.   Denies suicidal or homicidal ideation.  Will continue sertraline 25 mg daily.     - sertraline (ZOLOFT) 25 MG tablet; Take 1 tablet by mouth daily.  Dispense: 90 tablet; Refill: 3    Age related  health maintenance guidelines discussed with patient.  -Colonoscopy already scheduled on 1/26/2024.  Advised patient to follow-up with this.    Return in about 3 months (around 2/29/2024) for Follow-up.    Records including  consults/labs/imaging from ED/ previous hospitalizations/outside facilities reviewed and discussed with patient.     Tori Siegel, CNP

## 2023-12-04 ENCOUNTER — APPOINTMENT (OUTPATIENT)
Dept: NEUROLOGY | Facility: CLINIC | Age: 58
End: 2023-12-04

## 2024-01-04 NOTE — ASU PREOP CHECKLIST - AS BP NONINV SITE
Colin Sotelo is a 59 y.o. male  .  Subjective:      Had covid in November. Discussed case at length. Discussed elevated lipds and sugars. Discussed psyllium fiber, red yeast rice and cinnamon. Discussed diet. Will repeat blood work. Discussed vaccinations. Discussed influenza.        Review of Systems   Constitutional:  Negative for activity change, appetite change, chills, diaphoresis, fatigue, fever and unexpected weight change.   HENT:  Negative for congestion, dental problem, drooling, ear discharge, ear pain, facial swelling, hearing loss, mouth sores, nosebleeds, postnasal drip, rhinorrhea, sinus pressure, sneezing, sore throat, tinnitus, trouble swallowing and voice change.    Eyes:  Negative for photophobia, pain, discharge, redness, itching and visual disturbance.   Respiratory:  Negative for apnea, cough, choking, chest tightness, shortness of breath, wheezing and stridor.    Cardiovascular:  Negative for chest pain, palpitations and leg swelling.   Gastrointestinal:  Negative for abdominal distention, abdominal pain, anal bleeding, blood in stool, constipation, diarrhea, nausea, rectal pain and vomiting.   Endocrine: Negative for cold intolerance, heat intolerance, polydipsia, polyphagia and polyuria.   Genitourinary:  Negative for decreased urine volume, difficulty urinating, dysuria, enuresis, flank pain, frequency, genital sores, hematuria, penile discharge, penile pain, penile swelling, scrotal swelling, testicular pain and urgency.   Musculoskeletal:  Negative for arthralgias, back pain, gait problem, joint swelling, myalgias, neck pain and neck stiffness.   Skin:  Negative for color change, pallor, rash and wound.   Allergic/Immunologic: Negative for environmental allergies, food allergies and immunocompromised state.   Neurological:  Negative for dizziness, tremors, seizures, syncope, facial asymmetry, speech difficulty, weakness, light-headedness, numbness and headaches.   Hematological:  Negative  large cuff/left upper arm

## 2024-03-04 ENCOUNTER — APPOINTMENT (OUTPATIENT)
Dept: NEUROLOGY | Facility: CLINIC | Age: 59
End: 2024-03-04

## 2024-04-01 NOTE — H&P PST ADULT - NSWEIGHTCALCTOOLDRUG_GEN_A_CORE
[FreeTextEntry2] : ABR + sedation pending medical clearance for outpatient sedation.  Additional recommendations pending results.  used

## 2024-05-27 NOTE — ASU PREOP CHECKLIST - ALLERGY BAND ON
Goal Outcome Evaluation:  Plan of Care Reviewed With: patient, spouse        Progress: no change  Outcome Evaluation: The patient presents alert and oriented x4 lying in bed with spouse at bedside. He has been heel touch weight bearing at home on his R LE and mobilizing with a knee scooter and RW. The hardware has now come through the skin so he is NWB on the R lower leg. He will need to use a wheelchair at home until his BKA. He was able to demonstrate a safe transfer to and from the  maintaining NWB on his R LE. He was also able to navigate the WC safely. He will also benefit from use of a BSC as home. No further needs from PT at this time. Recommend discharge home with assist.      Anticipated Discharge Disposition (PT): home with assist                         no known allergies

## 2024-05-30 ENCOUNTER — APPOINTMENT (OUTPATIENT)
Dept: NEUROLOGY | Facility: CLINIC | Age: 59
End: 2024-05-30
Payer: MEDICARE

## 2024-05-30 VITALS
DIASTOLIC BLOOD PRESSURE: 80 MMHG | HEART RATE: 72 BPM | WEIGHT: 297 LBS | HEIGHT: 71 IN | OXYGEN SATURATION: 97 % | SYSTOLIC BLOOD PRESSURE: 116 MMHG | BODY MASS INDEX: 41.58 KG/M2

## 2024-05-30 DIAGNOSIS — M26.609 UNSPECIFIED TEMPOROMANDIBULAR JOINT DISORDER: ICD-10-CM

## 2024-05-30 DIAGNOSIS — R51.9 HEADACHE, UNSPECIFIED: ICD-10-CM

## 2024-05-30 PROCEDURE — 99204 OFFICE O/P NEW MOD 45 MIN: CPT

## 2024-05-30 PROCEDURE — G2211 COMPLEX E/M VISIT ADD ON: CPT

## 2024-05-30 RX ORDER — TORSEMIDE 10 MG/1
10 TABLET ORAL
Refills: 0 | Status: ACTIVE | COMMUNITY

## 2024-05-30 RX ORDER — APIXABAN 5 MG/1
5 TABLET, FILM COATED ORAL
Refills: 0 | Status: ACTIVE | COMMUNITY

## 2024-05-30 RX ORDER — EZETIMIBE 10 MG/1
10 TABLET ORAL
Refills: 0 | Status: ACTIVE | COMMUNITY

## 2024-05-30 RX ORDER — ROSUVASTATIN CALCIUM 40 MG/1
40 TABLET, FILM COATED ORAL
Refills: 0 | Status: ACTIVE | COMMUNITY

## 2024-05-30 RX ORDER — ASPIRIN 81 MG/1
81 TABLET ORAL
Refills: 0 | Status: ACTIVE | COMMUNITY

## 2024-05-30 RX ORDER — SACUBITRIL AND VALSARTAN 24; 26 MG/1; MG/1
24-26 TABLET, FILM COATED ORAL
Refills: 0 | Status: ACTIVE | COMMUNITY

## 2024-05-30 RX ORDER — BUPROPION HYDROCHLORIDE 300 MG/1
300 TABLET, EXTENDED RELEASE ORAL DAILY
Refills: 0 | Status: DISCONTINUED | COMMUNITY
End: 2024-05-30

## 2024-05-30 RX ORDER — AMLODIPINE BESYLATE 5 MG/1
5 TABLET ORAL
Refills: 0 | Status: ACTIVE | COMMUNITY

## 2024-05-30 RX ORDER — CARVEDILOL 6.25 MG/1
6.25 TABLET, FILM COATED ORAL
Refills: 0 | Status: ACTIVE | COMMUNITY

## 2024-05-30 RX ORDER — ALLOPURINOL 300 MG/1
300 TABLET ORAL
Refills: 0 | Status: ACTIVE | COMMUNITY

## 2024-05-30 RX ORDER — SEMAGLUTIDE 1.34 MG/ML
4 INJECTION, SOLUTION SUBCUTANEOUS
Refills: 0 | Status: ACTIVE | COMMUNITY

## 2024-05-31 NOTE — PHYSICAL EXAM
[FreeTextEntry1] : GENERAL PHYSICAL EXAM: GEN: no distress, normal affect  NEUROLOGICAL EXAM: Mental Status Orientation: alert and oriented to person, place, time, and situation Language: clear and fluent, intact comprehension and repetition. No dysarthria.   Cranial Nerves II: visual fields full to confrontation III, IV, VI: PERRL, EOMI V, VII: facial sensation and movement intact and symmetric VIII: hearing intact IX, X: uvula midline, soft palate elevates normally XI: BL shoulder shrug intact XII: tongue midline   Motor Shoulder abduction: 5 (R), 5 (L) UE flexion: 5 (R), 5 (L) UE extension: 5 (R), 5 (L) Hand : 5 (R), 5 (L) Hip flexion: 5 (R), 5 (L) Knee flexion: 5 (R), 5 (L) Knee extension: 5 (R), 5 (L) Dorsiflexion: 5 (R), 5 (L) Plantar flexion: 5 (R), 5 (L)   Tone and bulk are normal in upper and lower limbs No pronator drift   Sensation Intact to light touch in all 4 EXTs   Coordination Normal FTN bilaterally Able to perform rapid, alternating movements   Gait Antalgic gait Negative Romberg

## 2024-05-31 NOTE — HISTORY OF PRESENT ILLNESS
[FreeTextEntry1] : Mr. Jara is a 59-year-old male with history of JESSICA (on CPAP), CHF, recently diagnosed AFib, T2DM, HLD, HTN, gout, prior cocaine use in his 20s who presents today for initial evaluation of headaches. He reports onset 1 month ago described as a pressure felt around his left eye and in front of his ear, with pain radiating up to his temple and down his jaw. He reports having 3-4 headaches in the last month, going away after about a half hour. He has not taken any OTC medication for the pain. Severity can be 7-8/10 at its worst. He has also noticed associated fullness and pain inside of his left ear, feeling like it is clogged. Reports sometimes hearing a cracking sound when chewing. Patient states he used to grind his teeth years ago but hasn't noticed doing this recently. Denies additional neurological symptoms.

## 2024-05-31 NOTE — DISCUSSION/SUMMARY
[FreeTextEntry1] : Mr. Jara is a 59-year-old male with history of JESSICA (on CPAP), CHF, recently diagnosed AFib, T2DM, HLD, HTN, gout, prior cocaine use in his 20s who presents today for initial evaluation of left sided headaches. No focal neurological deficits on exam. Patient's symptoms appear to be consistent with TMJ dysfunction. Will obtain MRI head wo contrast to evaluate for any secondary causes of this new onset head pain. He was educated on triggers for TMJ. I recommend warm compress if needed for the pain. Also advised patient to see his Dentist for further evaluation for bruxism and a nightguard if necessary. PT referral provided. He will follow up in 3 months, or sooner if needed.  All of the patient's questions and concerns were addressed.

## 2024-06-07 ENCOUNTER — APPOINTMENT (OUTPATIENT)
Dept: PULMONOLOGY | Facility: CLINIC | Age: 59
End: 2024-06-07
Payer: MEDICARE

## 2024-06-07 VITALS
RESPIRATION RATE: 16 BRPM | BODY MASS INDEX: 41.58 KG/M2 | SYSTOLIC BLOOD PRESSURE: 126 MMHG | HEART RATE: 74 BPM | HEIGHT: 71 IN | WEIGHT: 297 LBS | OXYGEN SATURATION: 97 % | DIASTOLIC BLOOD PRESSURE: 72 MMHG

## 2024-06-07 DIAGNOSIS — E66.01 MORBID (SEVERE) OBESITY DUE TO EXCESS CALORIES: ICD-10-CM

## 2024-06-07 DIAGNOSIS — J98.11 ATELECTASIS: ICD-10-CM

## 2024-06-07 DIAGNOSIS — G47.33 OBSTRUCTIVE SLEEP APNEA (ADULT) (PEDIATRIC): ICD-10-CM

## 2024-06-07 DIAGNOSIS — R94.2 ABNORMAL RESULTS OF PULMONARY FUNCTION STUDIES: ICD-10-CM

## 2024-06-07 DIAGNOSIS — J98.6 DISORDERS OF DIAPHRAGM: ICD-10-CM

## 2024-06-07 DIAGNOSIS — R06.02 SHORTNESS OF BREATH: ICD-10-CM

## 2024-06-07 PROCEDURE — G2211 COMPLEX E/M VISIT ADD ON: CPT

## 2024-06-07 PROCEDURE — 99214 OFFICE O/P EST MOD 30 MIN: CPT

## 2024-06-07 RX ORDER — OMEGA-3/DHA/EPA/FISH OIL 300-1000MG
1000 CAPSULE ORAL
Refills: 0 | Status: DISCONTINUED | COMMUNITY
End: 2024-06-07

## 2024-06-07 RX ORDER — ERYTHROMYCIN 500 MG/1
500 TABLET, FILM COATED ORAL
Qty: 6 | Refills: 0 | Status: DISCONTINUED | COMMUNITY
Start: 2023-04-12 | End: 2024-06-07

## 2024-06-07 NOTE — DISCUSSION/SUMMARY
[FreeTextEntry1] : #1. Diet and exercise for weight loss. #2. Could consider Advair but for now will continue as needed ProAir for wheeze and exertional SOB; pt improved clinically without chronic BD therapy. #3. Exertional SOB and restrictive pattern on spirometry are likely weight related; follow lung function intermittently if pt allows. #4. Prior CXR from 2018 was clear by report and repeat with mildly elevated R hemidiaphragm with ? RLL infiltrate vs atelectasis; xray fluoroscopy to evaluate possible paralysis of R hemidiaphragm revealed no paradoxical motion but with moderately impaired motion of unclear etiology; could consider neurologic evaluation but he has not been interested; consider repeat fluoroscopy for any worsening of R hemidiaphragm if needed. #5. Rec AutoCPAP 7-16 cm of water for moderate JESSICA which had improved from previous with weight loss; encouraged compliance with CPAP; The patient is using and benefitting from CPAP therapy.  #6.  Recommended second pulmonary opinion in the past since he seemed frustrated with his current condition and lack of improvement despite therapy but now appears to be better and more accepting of his condition #7. Consider eventual MCT to r/o asthma if intermittent SOB and wheeze persist but pt is not interested #8. Rec that lung function should be followed periodically when pt is willing #9. Consider bariatric surgery re-evaluation given weight gain; also recommended nutritionist lawrence; info given to pt previously; he is willing to consider surgical options if unable to lose weight on his own #10. Replace equipment as needed if uses CPAP; gave and ordered AirFit N30i mask previously; ordered equipment 4/21/23 with new FFM per pt preference #11. F/u with obesity medicine for weight loss #12. Recommended Covid vaccines; s/p one Covid vaccine #13. Pt now more willing to f/u and follow recommendations  The patient expressed understanding and agreement with the above recommendations/plan and accepts responsibility to be compliant with recommended testing, therapies, and f/u visits. All relevant questions and concerns were addressed.  no

## 2024-06-07 NOTE — RESULTS/DATA
[TextEntry] : PSG from 1/19/16 revealed severe JESSICA with an AHI of near 50  CPAP titration study from 3/17/16 did not reveal an effective pressure so the patient is on an auto-titrating machine  Home PSG from 10/25/19 revealed moderate JESSICA with an AHI of 23. CPAP titration study performed on 11/25/19 did not reveal an optimal pressure of so 7-12 cm of water was recommended but pt has gained 30 lbs since then so is on 7-16 cm of water  The patient's overall compliance was 67-97% with a >4hr compliance of 30-93% on autoCPAP with a median and max pressure of 7.7-8.4 and 11-13 cm of water, respectively with a residual AHI of 1.4-2.8 which was normal; he had stopped using his therapy but now is willing to try again. CPAP therapy does seem to be effective when he uses it and is now more compliant.

## 2024-06-07 NOTE — PROCEDURE
[FreeTextEntry1] : PFTs performed previously revealed a mildly reduced FVC and FEV1 without an obstructive pattern. Lung volumes revealed a mildly reduced TLC. Diffusion capacity was normal. Overall, his lung fxn was at baseline and revealed only mild restriction. PFTs performed subsequently in 2016 again revealed a restrictive pattern without a significant BD response. Lung volumes were mildly reduced with a normal diffusion capacity. This spirometry today is slightly reduced c/w his previous study. PFTs 1/13/21 - Moderate restriction with mildly reduced lung volumes and borderline reduced diffusion capacity which corrected for alveolar volume; overall worse restriction from previous in 2016. PFTs 4/21/23 - Mild restriction with normal lung volumes; near baseline.

## 2024-06-07 NOTE — REASON FOR VISIT
Will call patient tomorrow to inform her of below because she was sedated today.    [Follow-Up] : a follow-up visit [Sleep Apnea] : sleep apnea [Shortness of Breath] : shortness of breath [Obesity] : obesity

## 2024-06-07 NOTE — END OF VISIT
[Time Spent: ___ minutes] : I have spent [unfilled] minutes of time on the encounter. [TextEntry] : Discussed with pt at length regarding JESSICA, morbid obesity, elevated diaphragm, SOB, resolved wheeze, possible asthma; reviewed prior w/u with pt as above.

## 2024-06-07 NOTE — HISTORY OF PRESENT ILLNESS
[Good Compliance] : good compliance with treatment [Good Tolerance] : good tolerance of treatment [Good Symptom Control] : good symptom control [Follow-Up - Routine Clinic] : a routine clinic follow-up of [Excess Weight] : excess weight [Inability to Lose Weight] : inability to lose weight [Dyspnea] : dyspnea [Back Pain] : back pain [Low Calorie Diet] : low calorie diet [Exercise Regimen] : exercise regimen [Fair Compliance] : fair compliance with treatment [Fair Tolerance] : fair tolerance of treatment [Fair Symptom Control] : fair symptom control [Sleep Apnea] : sleep apnea [High] : high [Low Calorie] : low calorie [On ___] : performed on [unfilled] [Patient] : the patient [To Assess ___] : to assess [unfilled] [TextBox_4] : H/o occasional wheeze and SOB especially with exertion; improved with Advair which he is now only using as needed. Pt was lost to f/u for 3 years Pt with CXR with ? RLL infiltrate vs atelectasis with elevated R hemidiaphragm with impaired motion but no paralysis He was previously hospitalized for SOB and was thought to have CHF. He reports multiple CHF exac and feels that this is caused by his CPAP therapy though I explained to him that CPAP should help CHF and his underlying cardiac function so refuses to use any more. He has an oral appliance which he apparently uses on occasion. He is very frustrated with his condition which I explained would like improve significantly with weight loss given his BMI>46.  He also has restriction on PFTs which also may be weight related. He had lost weight after gastric sleeve surgery in 2016 but has gained most of it back. Prior imaging studies revealed only mild basilar atelectasis and possible pulm edema.  W/u for elevated hemidiaphragm revealed impaired motion but no paralysis of the hemidiaphragm. S/p colonoscopy and polypectomy. Pt on Ozempic and losing weight form 335lbs to now 297lbs. Last seen nearly one year ago. Pt reports he needs replacement equipment for his PAP therapy. Reports no new respiratory complaints. [None] : No associated symptoms are reported [de-identified] : AutoCPAP [Diabetes] : no diabetes [Hypertension] : no hypertension [FreeTextEntry9] : CXR [FreeTextEntry8] : Mildly elevated R hemidiaphragm with ? infiltrate or compressive atelectasis [TextEntry] : CXR from 1/8/21 revealed elevation of R hemidiaphragm with residual R basilar atelectasis Xray fluoroscopy from 1/21/21 to evaluate possible paralysis of R hemidiaphragm revealed no paradoxical motion but with moderately impaired motion CXR from 10/6/21 revealed b/l diffuse patchy and interstitial opacities especially at the bases c/w infection/inflammation vs PVC CXR from 4/18/23 was clear with elevated R hemidiaphragm.

## 2024-06-07 NOTE — CONSULT LETTER
[Dear  ___] : Dear  [unfilled], [Consult Letter:] : I had the pleasure of evaluating your patient, [unfilled]. [Please see my note below.] : Please see my note below. [Consult Closing:] : Thank you very much for allowing me to participate in the care of this patient.  If you have any questions, please do not hesitate to contact me. [Sincerely,] : Sincerely, [DrMary  ___] : Dr. MONROE [FreeTextEntry3] : Maciel Shah MD, FCCP, D. ABSM\par  Pulmonary and Sleep Medicine\par  [facility] [place]

## 2024-06-14 ENCOUNTER — OUTPATIENT (OUTPATIENT)
Dept: OUTPATIENT SERVICES | Facility: HOSPITAL | Age: 59
LOS: 1 days | End: 2024-06-14
Payer: MEDICARE

## 2024-06-14 ENCOUNTER — APPOINTMENT (OUTPATIENT)
Dept: MRI IMAGING | Facility: CLINIC | Age: 59
End: 2024-06-14
Payer: MEDICARE

## 2024-06-14 DIAGNOSIS — Z98.890 OTHER SPECIFIED POSTPROCEDURAL STATES: Chronic | ICD-10-CM

## 2024-06-14 DIAGNOSIS — R51.9 HEADACHE, UNSPECIFIED: ICD-10-CM

## 2024-06-14 PROCEDURE — 70551 MRI BRAIN STEM W/O DYE: CPT | Mod: 26

## 2024-06-14 PROCEDURE — 70551 MRI BRAIN STEM W/O DYE: CPT

## 2024-06-21 ENCOUNTER — NON-APPOINTMENT (OUTPATIENT)
Age: 59
End: 2024-06-21

## 2024-09-09 ENCOUNTER — APPOINTMENT (OUTPATIENT)
Dept: NEUROLOGY | Facility: CLINIC | Age: 59
End: 2024-09-09
Payer: MEDICARE

## 2024-09-09 VITALS
DIASTOLIC BLOOD PRESSURE: 68 MMHG | BODY MASS INDEX: 41.44 KG/M2 | HEART RATE: 66 BPM | WEIGHT: 296 LBS | SYSTOLIC BLOOD PRESSURE: 100 MMHG | OXYGEN SATURATION: 98 % | HEIGHT: 71 IN

## 2024-09-09 DIAGNOSIS — R51.9 HEADACHE, UNSPECIFIED: ICD-10-CM

## 2024-09-09 DIAGNOSIS — I62.9 NONTRAUMATIC INTRACRANIAL HEMORRHAGE, UNSPECIFIED: ICD-10-CM

## 2024-09-09 DIAGNOSIS — M26.609 UNSPECIFIED TEMPOROMANDIBULAR JOINT DISORDER: ICD-10-CM

## 2024-09-09 PROCEDURE — 99215 OFFICE O/P EST HI 40 MIN: CPT

## 2024-09-09 NOTE — DISCUSSION/SUMMARY
[FreeTextEntry1] : Mr. Jara is a 59-year-old male with history of JESSICA (on CPAP), CHF, AFib, T2DM, HLD, HTN, gout who presents today for follow up evaluation of left sided headaches which appears to be a result of possible TMJ. No focal neurological deficits on exam. Given findings of MRI, will obtain MRI brain w/contrast for further evaluation. Headaches are still occurring but infrequently. I encouraged him to go see his dentist for evaluation for TMJ dysfunction and to try PT to see if this improves his headaches. He may continue to use warm compress if needed for the pain. He is pending an appointment with ophthalmology for evaluation of diplopia. I will call him with any urgent imaging results. He will follow up in 3 months, or sooner if needed.  All of the patient's questions and concerns were addressed.

## 2024-09-09 NOTE — HISTORY OF PRESENT ILLNESS
[FreeTextEntry1] : Mr. Jara is a 59-year-old male with history of JESSICA (on CPAP), CHF, AFib, T2DM, HLD, HTN, gout who presents today for follow up evaluation of left sided headaches which appears to be a result of possible TMJ. MRI brain wo contrast revealed encephalomalacia/gliosis in the right frontal lobe with associated susceptibility likely reflecting prior hemorrhage. Denies history of stroke-like symptoms. No change in headache presentation, occurring infrequently, 2-3 times since last visit. He never went to PT or saw a dentist. He has been using warm compress which helps with the discomfort, usually resolving within about 30 minutes. He has noticed brief episodes of diplopia, occurring randomly 1-2x/day, over the last few months. Vision returns to normal in a few seconds after blinking. He is pending appointment with ophthalmology. No additional new concerns.

## 2024-09-27 ENCOUNTER — APPOINTMENT (OUTPATIENT)
Dept: PULMONOLOGY | Facility: CLINIC | Age: 59
End: 2024-09-27
Payer: MEDICARE

## 2024-09-27 ENCOUNTER — APPOINTMENT (OUTPATIENT)
Dept: PULMONOLOGY | Facility: CLINIC | Age: 59
End: 2024-09-27

## 2024-09-27 VITALS
OXYGEN SATURATION: 97 % | DIASTOLIC BLOOD PRESSURE: 70 MMHG | SYSTOLIC BLOOD PRESSURE: 132 MMHG | BODY MASS INDEX: 27.72 KG/M2 | WEIGHT: 198 LBS | HEART RATE: 35 BPM | HEIGHT: 71 IN | RESPIRATION RATE: 16 BRPM

## 2024-09-27 DIAGNOSIS — J98.11 ATELECTASIS: ICD-10-CM

## 2024-09-27 DIAGNOSIS — G47.33 OBSTRUCTIVE SLEEP APNEA (ADULT) (PEDIATRIC): ICD-10-CM

## 2024-09-27 DIAGNOSIS — E66.01 MORBID (SEVERE) OBESITY DUE TO EXCESS CALORIES: ICD-10-CM

## 2024-09-27 DIAGNOSIS — R06.02 SHORTNESS OF BREATH: ICD-10-CM

## 2024-09-27 DIAGNOSIS — J98.6 DISORDERS OF DIAPHRAGM: ICD-10-CM

## 2024-09-27 DIAGNOSIS — R94.2 ABNORMAL RESULTS OF PULMONARY FUNCTION STUDIES: ICD-10-CM

## 2024-09-27 PROCEDURE — 94727 GAS DIL/WSHOT DETER LNG VOL: CPT

## 2024-09-27 PROCEDURE — 85018 HEMOGLOBIN: CPT | Mod: QW

## 2024-09-27 PROCEDURE — 94729 DIFFUSING CAPACITY: CPT

## 2024-09-27 PROCEDURE — 94010 BREATHING CAPACITY TEST: CPT

## 2024-09-30 NOTE — HISTORY OF PRESENT ILLNESS
[Good Compliance] : good compliance with treatment [Good Tolerance] : good tolerance of treatment [Good Symptom Control] : good symptom control [Follow-Up - Routine Clinic] : a routine clinic follow-up of [Excess Weight] : excess weight [Inability to Lose Weight] : inability to lose weight [Dyspnea] : dyspnea [Back Pain] : back pain [Low Calorie Diet] : low calorie diet [Exercise Regimen] : exercise regimen [Fair Compliance] : fair compliance with treatment [Fair Tolerance] : fair tolerance of treatment [Fair Symptom Control] : fair symptom control [Sleep Apnea] : sleep apnea [High] : high [Low Calorie] : low calorie [On ___] : performed on [unfilled] [Patient] : the patient [To Assess ___] : to assess [unfilled] [None] : no new symptoms reported [TextBox_4] : H/o occasional wheeze and SOB especially with exertion; improved with Advair which he is now only using as needed. Pt was lost to f/u for 3 years Pt with CXR with ? RLL infiltrate vs atelectasis with elevated R hemidiaphragm with impaired motion but no paralysis He was previously hospitalized for SOB and was thought to have CHF. He reports multiple CHF exac and feels that this is caused by his CPAP therapy though I explained to him that CPAP should help CHF and his underlying cardiac function so refuses to use any more. He has an oral appliance which he apparently uses on occasion. He is very frustrated with his condition which I explained would like improve significantly with weight loss given his BMI>46.  He also has restriction on PFTs which also may be weight related. He had lost weight after gastric sleeve surgery in 2016 but has gained most of it back. Prior imaging studies revealed only mild basilar atelectasis and possible pulm edema.  W/u for elevated hemidiaphragm revealed impaired motion but no paralysis of the hemidiaphragm. S/p colonoscopy and polypectomy. Pt on Ozempic and losing weight form 335lbs to now 297lbs. Last seen nearly one year ago. Pt reports he needs replacement equipment for his PAP therapy. Reports no new respiratory complaints. [de-identified] : AutoCPAP [Diabetes] : no diabetes [Hypertension] : no hypertension [FreeTextEntry9] : CXR [FreeTextEntry8] : Mildly elevated R hemidiaphragm with ? infiltrate or compressive atelectasis [TextEntry] : CXR from 1/8/21 revealed elevation of R hemidiaphragm with residual R basilar atelectasis Xray fluoroscopy from 1/21/21 to evaluate possible paralysis of R hemidiaphragm revealed no paradoxical motion but with moderately impaired motion CXR from 10/6/21 revealed b/l diffuse patchy and interstitial opacities especially at the bases c/w infection/inflammation vs PVC CXR from 4/18/23 was clear with elevated R hemidiaphragm.

## 2024-09-30 NOTE — PROCEDURE
[FreeTextEntry1] : PFTs performed previously revealed a mildly reduced FVC and FEV1 without obstruction. Lung volumes revealed a mildly reduced TLC. DLCO was normal. Overall, his lung fxn was at baseline and revealed only mild restriction. PFTs performed subsequently in 2016 again revealed restriction without a significant BD response. Lung volumes were mildly reduced with a normal DLCO. This spirometry was slightly reduced c/w his previous study. PFTs 1/13/21 - Moderate restriction with mildly reduced lung volumes and borderline reduced diffusion capacity which corrected for alveolar volume; overall worse restriction from previous in 2016. PFTs 4/21/23 - Mild restriction with normal lung volumes; near baseline. PFTs 9/27/24 - Mild restriction with mildly reduced lung volumes and mildly reduced DLCO.

## 2024-09-30 NOTE — CONSULT LETTER
[Dear  ___] : Dear  [unfilled], [Consult Letter:] : I had the pleasure of evaluating your patient, [unfilled]. [Please see my note below.] : Please see my note below. [Consult Closing:] : Thank you very much for allowing me to participate in the care of this patient.  If you have any questions, please do not hesitate to contact me. [Sincerely,] : Sincerely, [DrMary  ___] : Dr. MONROE [FreeTextEntry3] : Maciel Shah MD, FCCP, D. ABSM\par  Pulmonary and Sleep Medicine\par  [facility] at [place]

## 2024-09-30 NOTE — END OF VISIT
[Time Spent: ___ minutes] : I have spent [unfilled] minutes of time on the encounter which excludes teaching and separately reported services. [TextEntry] : Discussed with pt at length regarding JESSICA, morbid obesity, elevated diaphragm, SOB, resolved wheeze, possible asthma; reviewed prior w/u with pt as above.

## 2024-09-30 NOTE — HISTORY OF PRESENT ILLNESS
[Good Compliance] : good compliance with treatment [Good Tolerance] : good tolerance of treatment [Good Symptom Control] : good symptom control [Follow-Up - Routine Clinic] : a routine clinic follow-up of [Excess Weight] : excess weight [Inability to Lose Weight] : inability to lose weight [Dyspnea] : dyspnea [Back Pain] : back pain [Low Calorie Diet] : low calorie diet [Exercise Regimen] : exercise regimen [Fair Compliance] : fair compliance with treatment [Fair Tolerance] : fair tolerance of treatment [Fair Symptom Control] : fair symptom control [Sleep Apnea] : sleep apnea [High] : high [Low Calorie] : low calorie [On ___] : performed on [unfilled] [Patient] : the patient [To Assess ___] : to assess [unfilled] [None] : no new symptoms reported [TextBox_4] : H/o occasional wheeze and SOB especially with exertion; improved with Advair which he is now only using as needed. Pt was lost to f/u for 3 years Pt with CXR with ? RLL infiltrate vs atelectasis with elevated R hemidiaphragm with impaired motion but no paralysis He was previously hospitalized for SOB and was thought to have CHF. He reports multiple CHF exac and feels that this is caused by his CPAP therapy though I explained to him that CPAP should help CHF and his underlying cardiac function so refuses to use any more. He has an oral appliance which he apparently uses on occasion. He is very frustrated with his condition which I explained would like improve significantly with weight loss given his BMI>46.  He also has restriction on PFTs which also may be weight related. He had lost weight after gastric sleeve surgery in 2016 but has gained most of it back. Prior imaging studies revealed only mild basilar atelectasis and possible pulm edema.  W/u for elevated hemidiaphragm revealed impaired motion but no paralysis of the hemidiaphragm. S/p colonoscopy and polypectomy. Pt on Ozempic and losing weight form 335lbs to now 297lbs. Last seen nearly one year ago. Pt reports he needs replacement equipment for his PAP therapy. Reports no new respiratory complaints. [de-identified] : AutoCPAP [Diabetes] : no diabetes [Hypertension] : no hypertension [FreeTextEntry9] : CXR [FreeTextEntry8] : Mildly elevated R hemidiaphragm with ? infiltrate or compressive atelectasis [TextEntry] : CXR from 1/8/21 revealed elevation of R hemidiaphragm with residual R basilar atelectasis Xray fluoroscopy from 1/21/21 to evaluate possible paralysis of R hemidiaphragm revealed no paradoxical motion but with moderately impaired motion CXR from 10/6/21 revealed b/l diffuse patchy and interstitial opacities especially at the bases c/w infection/inflammation vs PVC CXR from 4/18/23 was clear with elevated R hemidiaphragm.

## 2024-09-30 NOTE — CONSULT LETTER
[Dear  ___] : Dear  [unfilled], [Consult Letter:] : I had the pleasure of evaluating your patient, [unfilled]. [Please see my note below.] : Please see my note below. [Consult Closing:] : Thank you very much for allowing me to participate in the care of this patient.  If you have any questions, please do not hesitate to contact me. [Sincerely,] : Sincerely, [DrMary  ___] : Dr. MONROE [FreeTextEntry3] : Maciel Shah MD, FCCP, D. ABSM\par  Pulmonary and Sleep Medicine\par  [facility] at [place]

## 2024-09-30 NOTE — DISCUSSION/SUMMARY
[FreeTextEntry1] : #1. Diet and exercise for weight loss. #2. Could consider Advair but for now will continue as needed ProAir for wheeze and exertional SOB; pt improved clinically without chronic BD therapy. #3. Exertional SOB and restrictive pattern on spirometry are likely weight related; follow lung function intermittently if pt allows. #4. Prior CXR from 2018 was clear by report and repeat with mildly elevated R hemidiaphragm with ? RLL infiltrate vs atelectasis; xray fluoroscopy to evaluate possible paralysis of R hemidiaphragm revealed no paradoxical motion but with moderately impaired motion of unclear etiology; could consider neurologic evaluation but he has not been interested; consider repeat fluoroscopy for any worsening of R hemidiaphragm if needed. #5. Rec AutoCPAP 7-16 cm of water for moderate JESSICA which had improved from previous with weight loss; encouraged compliance with CPAP; The patient is using and benefitting from CPAP therapy.  #6.  Recommended second pulmonary opinion in the past since he seemed frustrated with his current condition and lack of improvement despite therapy but now appears to be better and more accepting of his condition #7. Consider eventual MCT to r/o asthma if intermittent SOB and wheeze persist but pt is not interested #8. Rec that lung function should be followed periodically when pt is willing #9. Consider bariatric surgery re-evaluation given weight gain; also recommended nutritionist lawrence; info given to pt previously; he is willing to consider surgical options if unable to lose weight on his own #10. Replace equipment as needed if uses CPAP; gave and ordered AirFit N30i mask previously; ordered equipment 4/21/23 with new FFM per pt preference #11. F/u with obesity medicine for weight loss #12. Recommended Covid vaccines; s/p one Covid vaccine #13. Pt now more willing to f/u and follow recommendations  The patient expressed understanding and agreement with the above recommendations/plan and accepts responsibility to be compliant with recommended testing, therapies, and f/u visits. All relevant questions and concerns were addressed.

## 2024-09-30 NOTE — RESULTS/DATA
[TextEntry] : PSG from 1/19/16 revealed severe JESSICA with an AHI of near 50  CPAP titration study from 3/17/16 did not reveal an effective pressure so the patient is on an auto-titrating machine  Home PSG from 10/25/19 revealed moderate JESSICA with an AHI of 23. CPAP titration study performed on 11/25/19 did not reveal an optimal pressure of so 7-12 cm of water was recommended but pt has gained 30 lbs since then so is on 7-16 cm of water  The patient's overall compliance was 67-97% with a >4hr compliance of 30-93% on autoCPAP with a median and max pressure of 7.7-8.4 and 11-13 cm of water, respectively with a residual AHI of 1.1-2.8 which was normal; he had stopped using his therapy but now is more compliant again. CPAP therapy does seem to be effective when he uses it and is now more compliant.

## 2024-10-17 ENCOUNTER — APPOINTMENT (OUTPATIENT)
Dept: MRI IMAGING | Facility: CLINIC | Age: 59
End: 2024-10-17
Payer: MEDICARE

## 2024-10-17 ENCOUNTER — OUTPATIENT (OUTPATIENT)
Dept: OUTPATIENT SERVICES | Facility: HOSPITAL | Age: 59
LOS: 1 days | End: 2024-10-17
Payer: MEDICARE

## 2024-10-17 DIAGNOSIS — Z98.890 OTHER SPECIFIED POSTPROCEDURAL STATES: Chronic | ICD-10-CM

## 2024-10-17 DIAGNOSIS — I62.9 NONTRAUMATIC INTRACRANIAL HEMORRHAGE, UNSPECIFIED: ICD-10-CM

## 2024-10-17 DIAGNOSIS — Z90.3 ACQUIRED ABSENCE OF STOMACH [PART OF]: Chronic | ICD-10-CM

## 2024-10-17 PROCEDURE — 70552 MRI BRAIN STEM W/DYE: CPT | Mod: 26

## 2024-10-17 PROCEDURE — A9585: CPT

## 2024-10-17 PROCEDURE — 70552 MRI BRAIN STEM W/DYE: CPT

## 2024-10-25 DIAGNOSIS — I63.9 CEREBRAL INFARCTION, UNSPECIFIED: ICD-10-CM

## 2024-11-13 ENCOUNTER — APPOINTMENT (OUTPATIENT)
Dept: GASTROENTEROLOGY | Facility: CLINIC | Age: 59
End: 2024-11-13
Payer: MEDICARE

## 2024-11-13 VITALS
HEIGHT: 71 IN | BODY MASS INDEX: 41.02 KG/M2 | WEIGHT: 293 LBS | RESPIRATION RATE: 16 BRPM | OXYGEN SATURATION: 97 % | SYSTOLIC BLOOD PRESSURE: 130 MMHG | DIASTOLIC BLOOD PRESSURE: 80 MMHG | HEART RATE: 85 BPM

## 2024-11-13 DIAGNOSIS — I25.10 ATHEROSCLEROTIC HEART DISEASE OF NATIVE CORONARY ARTERY W/OUT ANGINA PECTORIS: ICD-10-CM

## 2024-11-13 DIAGNOSIS — R94.2 ABNORMAL RESULTS OF PULMONARY FUNCTION STUDIES: ICD-10-CM

## 2024-11-13 DIAGNOSIS — E11.9 TYPE 2 DIABETES MELLITUS W/OUT COMPLICATIONS: ICD-10-CM

## 2024-11-13 DIAGNOSIS — I27.21 SECONDARY PULMONARY ARTERIAL HYPERTENSION: ICD-10-CM

## 2024-11-13 DIAGNOSIS — Z86.79 PERSONAL HISTORY OF OTHER DISEASES OF THE CIRCULATORY SYSTEM: ICD-10-CM

## 2024-11-13 DIAGNOSIS — I10 ESSENTIAL (PRIMARY) HYPERTENSION: ICD-10-CM

## 2024-11-13 DIAGNOSIS — R51.9 HEADACHE, UNSPECIFIED: ICD-10-CM

## 2024-11-13 DIAGNOSIS — F32.A DEPRESSION, UNSPECIFIED: ICD-10-CM

## 2024-11-13 DIAGNOSIS — I42.8 OTHER CARDIOMYOPATHIES: ICD-10-CM

## 2024-11-13 DIAGNOSIS — G47.33 OBSTRUCTIVE SLEEP APNEA (ADULT) (PEDIATRIC): ICD-10-CM

## 2024-11-13 DIAGNOSIS — J98.6 DISORDERS OF DIAPHRAGM: ICD-10-CM

## 2024-11-13 DIAGNOSIS — M10.9 GOUT, UNSPECIFIED: ICD-10-CM

## 2024-11-13 DIAGNOSIS — M26.609 UNSPECIFIED TEMPOROMANDIBULAR JOINT DISORDER: ICD-10-CM

## 2024-11-13 DIAGNOSIS — I63.9 CEREBRAL INFARCTION, UNSPECIFIED: ICD-10-CM

## 2024-11-13 DIAGNOSIS — D12.6 BENIGN NEOPLASM OF COLON, UNSPECIFIED: ICD-10-CM

## 2024-11-13 PROCEDURE — 99214 OFFICE O/P EST MOD 30 MIN: CPT

## 2024-11-13 RX ORDER — SODIUM SULFATE, POTASSIUM SULFATE AND MAGNESIUM SULFATE 1.6; 3.13; 17.5 G/177ML; G/177ML; G/177ML
17.5-3.13-1.6 SOLUTION ORAL
Qty: 354 | Refills: 0 | Status: ACTIVE | COMMUNITY
Start: 2024-11-13 | End: 1900-01-01

## 2024-11-14 ENCOUNTER — OUTPATIENT (OUTPATIENT)
Dept: OUTPATIENT SERVICES | Facility: HOSPITAL | Age: 59
LOS: 1 days | End: 2024-11-14

## 2024-11-14 ENCOUNTER — APPOINTMENT (OUTPATIENT)
Dept: MRI IMAGING | Facility: CLINIC | Age: 59
End: 2024-11-14
Payer: MEDICARE

## 2024-11-14 DIAGNOSIS — Z90.3 ACQUIRED ABSENCE OF STOMACH [PART OF]: Chronic | ICD-10-CM

## 2024-11-14 DIAGNOSIS — Z98.890 OTHER SPECIFIED POSTPROCEDURAL STATES: Chronic | ICD-10-CM

## 2024-11-14 DIAGNOSIS — I63.9 CEREBRAL INFARCTION, UNSPECIFIED: ICD-10-CM

## 2024-11-14 PROCEDURE — 70544 MR ANGIOGRAPHY HEAD W/O DYE: CPT | Mod: 26

## 2024-12-09 ENCOUNTER — APPOINTMENT (OUTPATIENT)
Dept: NEUROLOGY | Facility: CLINIC | Age: 59
End: 2024-12-09

## 2024-12-09 VITALS
BODY MASS INDEX: 41.3 KG/M2 | HEIGHT: 71 IN | WEIGHT: 295 LBS | HEART RATE: 85 BPM | DIASTOLIC BLOOD PRESSURE: 82 MMHG | OXYGEN SATURATION: 98 % | SYSTOLIC BLOOD PRESSURE: 136 MMHG

## 2024-12-09 PROCEDURE — 99215 OFFICE O/P EST HI 40 MIN: CPT

## 2024-12-09 PROCEDURE — G2211 COMPLEX E/M VISIT ADD ON: CPT

## 2024-12-19 ENCOUNTER — APPOINTMENT (OUTPATIENT)
Dept: NEUROLOGY | Facility: CLINIC | Age: 59
End: 2024-12-19

## 2024-12-31 ENCOUNTER — APPOINTMENT (OUTPATIENT)
Dept: NEUROLOGY | Facility: CLINIC | Age: 59
End: 2024-12-31

## 2025-01-29 ENCOUNTER — APPOINTMENT (OUTPATIENT)
Dept: GASTROENTEROLOGY | Facility: GI CENTER | Age: 60
End: 2025-01-29

## 2025-03-06 ENCOUNTER — APPOINTMENT (OUTPATIENT)
Dept: NEUROLOGY | Facility: CLINIC | Age: 60
End: 2025-03-06
Payer: MEDICARE

## 2025-03-06 VITALS
WEIGHT: 293 LBS | DIASTOLIC BLOOD PRESSURE: 78 MMHG | OXYGEN SATURATION: 96 % | BODY MASS INDEX: 41.02 KG/M2 | HEART RATE: 70 BPM | SYSTOLIC BLOOD PRESSURE: 114 MMHG | HEIGHT: 71 IN

## 2025-03-06 DIAGNOSIS — R51.9 HEADACHE, UNSPECIFIED: ICD-10-CM

## 2025-03-06 DIAGNOSIS — I63.9 CEREBRAL INFARCTION, UNSPECIFIED: ICD-10-CM

## 2025-03-06 PROCEDURE — 99213 OFFICE O/P EST LOW 20 MIN: CPT

## 2025-03-06 RX ORDER — COLCHICINE 0.6 MG/1
0.6 TABLET ORAL
Refills: 0 | Status: ACTIVE | COMMUNITY

## 2025-03-06 RX ORDER — TORSEMIDE 10 MG/1
10 TABLET ORAL
Refills: 0 | Status: ACTIVE | COMMUNITY

## 2025-03-06 NOTE — ED PROVIDER NOTE - OBJECTIVE STATEMENT
This patient has been assessed with a concern for Malnutrition and has been determined to have a diagnosis/diagnoses of Severe protein-calorie malnutrition and Underweight (BMI < 19).    This patient is being managed with:   Diet Regular-  Low Sodium  Supplement Feeding Modality:  Oral  Ensure Plus High Protein Cans or Servings Per Day:  1       Frequency:  Daily  Entered: Feb 18 2025  5:20PM   This patient has been assessed with a concern for Malnutrition and has been determined to have a diagnosis/diagnoses of Severe protein-calorie malnutrition and Underweight (BMI < 19).    This patient is being managed with:   Diet Regular-  Low Sodium  Supplement Feeding Modality:  Oral  Ensure Plus High Protein Cans or Servings Per Day:  1       Frequency:  Daily  Entered: Feb 18 2025  5:20PM   This patient has been assessed with a concern for Malnutrition and has been determined to have a diagnosis/diagnoses of Severe protein-calorie malnutrition and Underweight (BMI < 19).    This patient is being managed with:   Diet Regular-  Low Sodium  Supplement Feeding Modality:  Oral  Ensure Plus High Protein Cans or Servings Per Day:  1       Frequency:  Daily  Entered: Feb 18 2025  5:20PM    This patient has been assessed with a concern for Malnutrition and has been determined to have a diagnosis/diagnoses of Severe protein-calorie malnutrition and Underweight (BMI < 19).    This patient is being managed with:   Diet Regular-  Low Sodium  Supplement Feeding Modality:  Oral  Ensure Plus High Protein Cans or Servings Per Day:  1       Frequency:  Daily  Entered: Feb 18 2025  5:20PM   This patient has been assessed with a concern for Malnutrition and has been determined to have a diagnosis/diagnoses of Severe protein-calorie malnutrition and Underweight (BMI < 19).    This patient is being managed with:   Diet Regular-  Low Sodium  Supplement Feeding Modality:  Oral  Ensure Plus High Protein Cans or Servings Per Day:  1       Frequency:  Daily  Entered: Mar  3 2025  5:00PM   This patient has been assessed with a concern for Malnutrition and has been determined to have a diagnosis/diagnoses of Severe protein-calorie malnutrition and Underweight (BMI < 19).    This patient is being managed with:   Diet Regular-  Low Sodium  Supplement Feeding Modality:  Oral  Ensure Plus High Protein Cans or Servings Per Day:  1       Frequency:  Daily  Entered: Mar  3 2025  5:00PM   This patient has been assessed with a concern for Malnutrition and has been determined to have a diagnosis/diagnoses of Severe protein-calorie malnutrition and Underweight (BMI < 19).    This patient is being managed with:   Diet NPO after Midnight-     NPO Start Date: 05-Mar-2025   NPO Start Time: 23:59  Entered: Mar  5 2025  1:00PM    Diet Regular-  Low Sodium  Supplement Feeding Modality:  Oral  Ensure Plus High Protein Cans or Servings Per Day:  1       Frequency:  Daily  Entered: Mar  3 2025  5:00PM   This patient has been assessed with a concern for Malnutrition and has been determined to have a diagnosis/diagnoses of Severe protein-calorie malnutrition and Underweight (BMI < 19).    This patient is being managed with:   Diet Regular-  Low Sodium  Supplement Feeding Modality:  Oral  Ensure Plus High Protein Cans or Servings Per Day:  1       Frequency:  Daily  Entered: Feb 18 2025  5:20PM   This patient has been assessed with a concern for Malnutrition and has been determined to have a diagnosis/diagnoses of Severe protein-calorie malnutrition and Underweight (BMI < 19).    This patient is being managed with:   Diet Regular-  Low Sodium  Supplement Feeding Modality:  Oral  Ensure Plus High Protein Cans or Servings Per Day:  1       Frequency:  Daily  Entered: Feb 18 2025  5:20PM   This patient has been assessed with a concern for Malnutrition and has been determined to have a diagnosis/diagnoses of Severe protein-calorie malnutrition and Underweight (BMI < 19).    This patient is being managed with:   Diet Regular-  Low Sodium  Supplement Feeding Modality:  Oral  Ensure Plus High Protein Cans or Servings Per Day:  1       Frequency:  Daily  Entered: Feb 18 2025  5:20PM   This patient has been assessed with a concern for Malnutrition and has been determined to have a diagnosis/diagnoses of Severe protein-calorie malnutrition and Underweight (BMI < 19).    This patient is being managed with:   Diet NPO after Midnight-     NPO Start Date: 02-Mar-2025   NPO Start Time: 23:59  Entered: Mar  2 2025  9:12AM    Diet Clear Liquid-  Entered: Mar  2 2025 12:00AM   This patient has been assessed with a concern for Malnutrition and has been determined to have a diagnosis/diagnoses of Severe protein-calorie malnutrition and Underweight (BMI < 19).    This patient is being managed with:   Diet Regular-  Low Sodium  Supplement Feeding Modality:  Oral  Ensure Plus High Protein Cans or Servings Per Day:  1       Frequency:  Daily  Entered: Feb 18 2025  5:20PM   This patient has been assessed with a concern for Malnutrition and has been determined to have a diagnosis/diagnoses of Severe protein-calorie malnutrition and Underweight (BMI < 19).    This patient is being managed with:   Diet Regular-  Low Sodium  Supplement Feeding Modality:  Oral  Ensure Plus High Protein Cans or Servings Per Day:  1       Frequency:  Daily  Entered: Feb 18 2025  5:20PM   This patient has been assessed with a concern for Malnutrition and has been determined to have a diagnosis/diagnoses of Severe protein-calorie malnutrition and Underweight (BMI < 19).    This patient is being managed with:   Diet Regular-  Low Sodium  Supplement Feeding Modality:  Oral  Ensure Plus High Protein Cans or Servings Per Day:  1       Frequency:  Daily  Entered: Feb 18 2025  5:20PM   This patient has been assessed with a concern for Malnutrition and has been determined to have a diagnosis/diagnoses of Severe protein-calorie malnutrition and Underweight (BMI < 19).    This patient is being managed with:   Diet Regular-  Low Sodium  Supplement Feeding Modality:  Oral  Ensure Plus High Protein Cans or Servings Per Day:  1       Frequency:  Daily  Entered: Mar  3 2025  5:00PM   This patient has been assessed with a concern for Malnutrition and has been determined to have a diagnosis/diagnoses of Severe protein-calorie malnutrition and Underweight (BMI < 19).    This patient is being managed with:   Diet Regular-  Low Sodium  Supplement Feeding Modality:  Oral  Ensure Plus High Protein Cans or Servings Per Day:  1       Frequency:  Daily  Entered: Feb 18 2025  5:20PM   This patient has been assessed with a concern for Malnutrition and has been determined to have a diagnosis/diagnoses of Severe protein-calorie malnutrition and Underweight (BMI < 19).    This patient is being managed with:   Diet Regular-  Low Sodium  Supplement Feeding Modality:  Oral  Ensure Plus High Protein Cans or Servings Per Day:  1       Frequency:  Daily  Entered: Feb 18 2025  5:20PM   This patient has been assessed with a concern for Malnutrition and has been determined to have a diagnosis/diagnoses of Severe protein-calorie malnutrition and Underweight (BMI < 19).    This patient is being managed with:   Diet Regular-  Low Sodium  Supplement Feeding Modality:  Oral  Ensure Plus High Protein Cans or Servings Per Day:  1       Frequency:  Daily  Entered: Feb 18 2025  5:20PM   The patient is a 54 year old male presents with SOB from pleural effusion similar to his previous episode.  Mild chest discomfort, No fever, No chill, No sick contact, No travel  Mild neck pain

## 2025-03-13 RX ORDER — ROSUVASTATIN CALCIUM 40 MG/1
40 TABLET, FILM COATED ORAL
Refills: 0 | Status: ACTIVE | COMMUNITY
Start: 2025-03-13

## 2025-04-22 ENCOUNTER — APPOINTMENT (OUTPATIENT)
Dept: OTOLARYNGOLOGY | Facility: CLINIC | Age: 60
End: 2025-04-22

## 2025-04-22 VITALS
HEIGHT: 71 IN | SYSTOLIC BLOOD PRESSURE: 119 MMHG | WEIGHT: 293 LBS | DIASTOLIC BLOOD PRESSURE: 81 MMHG | HEART RATE: 81 BPM | BODY MASS INDEX: 41.02 KG/M2

## 2025-04-22 DIAGNOSIS — H61.23 IMPACTED CERUMEN, BILATERAL: ICD-10-CM

## 2025-04-22 DIAGNOSIS — H93.A3 PULSATILE TINNITUS, BILATERAL: ICD-10-CM

## 2025-04-22 PROCEDURE — 99203 OFFICE O/P NEW LOW 30 MIN: CPT | Mod: 25

## 2025-04-22 PROCEDURE — 69210 REMOVE IMPACTED EAR WAX UNI: CPT

## 2025-05-29 NOTE — ED ADULT NURSE NOTE - CADM POA URETHRAL CATHETER
Assessment & Plan  Status post labral repair of shoulder             S/P RIGHT shoulder anterior labral repair  on 5/21/2025    Will be starting PT   Script provided  Discussed sling use  Follow up 6 weeks        S:  Doing well, pain controlled. Using sling.     Exam:  Incision c/d/i   SILT ax/r/m/u, minimal   Motor intact ax/r/m/u   Hand wwp     Scribe Attestation      I,:  Tye Duran am acting as a scribe while in the presence of the attending physician.:       I,:  Bharat Granados MD personally performed the services described in this documentation    as scribed in my presence.:              No

## 2025-06-09 ENCOUNTER — APPOINTMENT (OUTPATIENT)
Dept: NEUROLOGY | Facility: CLINIC | Age: 60
End: 2025-06-09

## 2025-09-02 ENCOUNTER — NON-APPOINTMENT (OUTPATIENT)
Age: 60
End: 2025-09-02

## 2025-09-04 ENCOUNTER — APPOINTMENT (OUTPATIENT)
Dept: NEUROLOGY | Facility: CLINIC | Age: 60
End: 2025-09-04